# Patient Record
Sex: FEMALE | Race: WHITE | NOT HISPANIC OR LATINO | Employment: OTHER | URBAN - NONMETROPOLITAN AREA
[De-identification: names, ages, dates, MRNs, and addresses within clinical notes are randomized per-mention and may not be internally consistent; named-entity substitution may affect disease eponyms.]

---

## 2019-12-03 ENCOUNTER — OFFICE VISIT (OUTPATIENT)
Dept: CARDIOLOGY | Facility: CLINIC | Age: 84
End: 2019-12-03

## 2019-12-03 VITALS
HEIGHT: 65 IN | HEART RATE: 78 BPM | DIASTOLIC BLOOD PRESSURE: 70 MMHG | OXYGEN SATURATION: 98 % | BODY MASS INDEX: 22.99 KG/M2 | WEIGHT: 138 LBS | SYSTOLIC BLOOD PRESSURE: 150 MMHG

## 2019-12-03 DIAGNOSIS — R00.1 BRADYCARDIA: ICD-10-CM

## 2019-12-03 DIAGNOSIS — I10 ESSENTIAL HYPERTENSION: ICD-10-CM

## 2019-12-03 DIAGNOSIS — I48.19 PERSISTENT ATRIAL FIBRILLATION (HCC): Primary | ICD-10-CM

## 2019-12-03 PROBLEM — I48.91 ATRIAL FIBRILLATION: Status: ACTIVE | Noted: 2019-12-03

## 2019-12-03 PROCEDURE — 99204 OFFICE O/P NEW MOD 45 MIN: CPT | Performed by: INTERNAL MEDICINE

## 2019-12-03 RX ORDER — ASCORBATE CALCIUM 500 MG
1 TABLET ORAL DAILY
COMMUNITY

## 2019-12-03 RX ORDER — SIMVASTATIN 20 MG
20 TABLET ORAL NIGHTLY
COMMUNITY
Start: 2019-10-29 | End: 2022-03-07 | Stop reason: SDUPTHER

## 2019-12-03 RX ORDER — FOLIC ACID 1 MG/1
1 TABLET ORAL 3 TIMES WEEKLY
Status: ON HOLD | COMMUNITY
End: 2021-11-09

## 2019-12-03 RX ORDER — MULTIVITAMIN WITH IRON
1 TABLET ORAL DAILY
COMMUNITY

## 2019-12-03 RX ORDER — LISINOPRIL AND HYDROCHLOROTHIAZIDE 12.5; 1 MG/1; MG/1
1 TABLET ORAL DAILY
COMMUNITY
Start: 2019-11-18 | End: 2022-03-07 | Stop reason: SDUPTHER

## 2019-12-03 RX ORDER — LANOLIN ALCOHOL/MO/W.PET/CERES
1 CREAM (GRAM) TOPICAL DAILY
COMMUNITY

## 2019-12-03 NOTE — PROGRESS NOTES
"    Subjective:     Encounter Date:12/03/2019      Patient ID: Yudi Westbrook is a 87 y.o. female who has been diagnosed with atrial fibrillation and has been placed on anticoagulation, also found to have nocturnal bradycardia, with a history of hypertension and hyperlipidemia who was referred here for further evaluation.    Referring Provider: Meek Luis MD  Reason for Referral: Atrial fibrillation    Chief Complaint: \"Skipped beats:    Atrial Fibrillation   Presents for initial visit. Symptoms are negative for chest pain, dizziness, hemodynamic instability, hypotension, palpitations, shortness of breath, syncope, tachycardia and weakness. The symptoms have been stable. Past treatments include nothing. Past medical history includes atrial fibrillation, HTN and hyperlipidemia. There is no history of CAD and CHF.   Hypertension   This is a chronic problem. The problem is unchanged. The problem is controlled. Pertinent negatives include no blurred vision, chest pain, headaches, neck pain, orthopnea, palpitations, peripheral edema, PND or shortness of breath. There are no associated agents to hypertension. Risk factors for coronary artery disease include dyslipidemia. Current antihypertension treatment includes ACE inhibitors and diuretics. The current treatment provides significant improvement. There are no compliance problems.  There is no history of angina, CAD/MI, CVA, heart failure or PVD.     This is an 87-year-old female who has recently been diagnosed with atrial fibrillation and placed on anticoagulation who also had a Holter monitor showing nocturnal bradycardia he was referred here for further evaluation.  Patient says that for quite some time she has had some \"skipped beats\".  She says that she does note this when she checks her blood pressure and her monitor will note some skipped beats, however she says that she essentially has no symptoms of this.  Particularly, the patient denies lightheadedness, " dizziness, syncope.  She says that every once while she will have some heartburn-like chest discomfort but this has been present for quite some time and she relates this to what she eats at times.  No prolonged episodes of chest pain.  The patient remains very physically active, mowing her own yard and even using a chainsaw and has had no limitations to activities.  She notes age-related shortness of breath and dyspnea on exertion but not out of proportion to her level of activities.  She reports that she had previously been on diuretic therapy but was taken off of this and then had some lower extremity edema form around her ankles and therefore was placed back on diuretic therapy.  Currently, no significant lower extremity edema, orthopnea, PND.  She reports that her blood pressure is well controlled on her current medications.  Her lipids are followed by her primary care provider.    She says that after she was diagnosed with atrial fibrillation, she had a Holter monitor placed.  This study is referenced below.  She says that at the request of her primary care provider, there was concern about the bradycardia that was noted on the monitor therefore she was referred to cardiology in [place] and was ultimately referred here with the question about whether or not the patient may need a pacemaker for her bradycardia.  She says that she was placed on anticoagulation with a dose of 2.5 mg twice daily of Eliquis but that it was suggested that she may need 5 mg tablets however when samples were provided to her the dose remained at 2.5 mg.  So far, she has not had any significant bleeding difficulties.    As stated, the patient denies absolutely any lightheadedness, dizziness or syncope.    The following portions of the patient's history were reviewed and updated as appropriate: allergies, current medications, past family history, past medical history, past social history, past surgical history and problem list.      Past Medical History:   Diagnosis Date   • Atrial fibrillation (CMS/HCC)    • Bradycardia    • Cancer (CMS/HCC) 2002    Breast   • Hyperlipidemia    • Hypertension      Past Surgical History:   Procedure Laterality Date   • APPENDECTOMY     • HYSTERECTOMY     • MASTECTOMY Bilateral        Current Outpatient Medications:   •  apixaban (ELIQUIS) 2.5 MG tablet tablet, Take 2.5 mg by mouth 2 (Two) Times a Day., Disp: , Rfl:   •  BIOTIN 5000 PO, Take 1 tablet by mouth 1 (One) Time Per Week., Disp: , Rfl:   •  Calcium Carb-Cholecalciferol (CALCIUM 600+D3) 600-200 MG-UNIT tablet, Take 1 tablet by mouth Daily., Disp: , Rfl:   •  folic acid (FOLVITE) 1 MG tablet, Take 1 mg by mouth 3 (Three) Times a Week., Disp: , Rfl:   •  lisinopril-hydrochlorothiazide (PRINZIDE,ZESTORETIC) 10-12.5 MG per tablet, Take 1 tablet by mouth Daily., Disp: , Rfl:   •  Magnesium 250 MG tablet, Take 1 tablet by mouth Daily., Disp: , Rfl:   •  Multiple Vitamins-Minerals (CENTRUM SILVER ADULT 50+ PO), Take 1 tablet by mouth Daily., Disp: , Rfl:   •  Potassium 99 MG tablet, Take 1 tablet by mouth Daily., Disp: , Rfl:   •  simvastatin (ZOCOR) 20 MG tablet, Take 20 mg by mouth Daily., Disp: , Rfl:   •  vitamin B-12 (CYANOCOBALAMIN) 1000 MCG tablet, Take 1 tablet by mouth Daily., Disp: , Rfl:   •  Vitamin E 100 units tablet, Take 1 tablet by mouth Daily., Disp: , Rfl:     Allergies   Allergen Reactions   • Morphine Unknown - Low Severity     Has not taken. But family members cannot take.   • Penicillins Rash     Social History     Tobacco Use   • Smoking status: Never Smoker   • Smokeless tobacco: Never Used   Substance Use Topics   • Alcohol use: No     Frequency: Never     Family History   Problem Relation Age of Onset   • Hypertension Father    • Heart attack Brother    • Cancer Brother      Review of Systems   Constitution: Negative for chills, fever, weakness, night sweats and weight loss.   HENT: Negative for congestion and hearing loss.     Eyes: Negative for blurred vision and pain.   Cardiovascular: Negative for chest pain, claudication, dyspnea on exertion, leg swelling, orthopnea, palpitations, paroxysmal nocturnal dyspnea and syncope.   Respiratory: Negative for cough, hemoptysis, shortness of breath and wheezing.    Endocrine: Negative for cold intolerance, heat intolerance, polydipsia and polyuria.   Hematologic/Lymphatic: Negative for adenopathy and bleeding problem. Does not bruise/bleed easily.   Skin: Negative for color change, poor wound healing and rash.   Musculoskeletal: Negative for arthritis, back pain, joint pain, joint swelling, myalgias and neck pain.   Gastrointestinal: Negative for abdominal pain, change in bowel habit, constipation, diarrhea, heartburn, hematochezia, melena, nausea and vomiting.   Genitourinary: Negative for dysuria, frequency, hematuria and nocturia.   Neurological: Negative for dizziness, focal weakness, headaches, light-headedness, loss of balance and numbness.   Psychiatric/Behavioral: Negative for altered mental status, memory loss and substance abuse.   Allergic/Immunologic: Negative for hives and persistent infections.         ECG 12 Lead  Date/Time: 12/4/2019 9:18 AM  Performed by: Andrea Montilla MD  Authorized by: Andrea Montilla MD   Comparison: compared with previous ECG from 11/18/2019  Similar to previous ECG  Rhythm: atrial fibrillation  Rate: normal  BPM: 73  Conduction: conduction normal  QRS axis: left  Other findings: poor R wave progression    Clinical impression: abnormal EKG               Objective:     Physical Exam   Constitutional: She is oriented to person, place, and time. Vital signs are normal. She appears well-developed and well-nourished. She is cooperative.  Non-toxic appearance. No distress.   HENT:   Head: Normocephalic and atraumatic.   Right Ear: External ear normal.   Left Ear: External ear normal.   Nose: Nose normal.   Mouth/Throat: Uvula is midline,  oropharynx is clear and moist and mucous membranes are normal. Mucous membranes are not pale, not dry and not cyanotic. No oropharyngeal exudate.   Eyes: EOM and lids are normal. Pupils are equal, round, and reactive to light.   Neck: Normal range of motion. Neck supple. No hepatojugular reflux and no JVD present. Carotid bruit is not present. No tracheal deviation and no edema present. No thyroid mass and no thyromegaly present.   Cardiovascular: Normal rate, S1 normal, S2 normal, normal heart sounds, intact distal pulses and normal pulses. An irregularly irregular rhythm present.  No extrasystoles are present. PMI is not displaced. Exam reveals no gallop and no friction rub.   No murmur heard.  Pulses:       Radial pulses are 2+ on the right side, and 2+ on the left side.        Femoral pulses are 2+ on the right side, and 2+ on the left side.       Dorsalis pedis pulses are 2+ on the right side, and 2+ on the left side.        Posterior tibial pulses are 2+ on the right side, and 2+ on the left side.   Pulmonary/Chest: Effort normal and breath sounds normal. No accessory muscle usage. No respiratory distress. She has no wheezes. She has no rales. She exhibits no tenderness.   Abdominal: Soft. Normal appearance and bowel sounds are normal. She exhibits no distension, no abdominal bruit and no pulsatile midline mass. There is no hepatosplenomegaly. There is no tenderness.   Musculoskeletal: Normal range of motion. She exhibits no edema, tenderness or deformity.   Lymphadenopathy:     She has no cervical adenopathy.   Neurological: She is oriented to person, place, and time. She has normal strength. No cranial nerve deficit.   Skin: Skin is warm, dry and intact. No rash noted. She is not diaphoretic. No cyanosis or erythema. Nails show no clubbing.   Psychiatric: She has a normal mood and affect. Her speech is normal and behavior is normal. Thought content normal.   Vitals reviewed.    /70 (BP Location: Left  "arm, Patient Position: Sitting)   Pulse 78   Ht 165.1 cm (65\")   Wt 62.6 kg (138 lb)   SpO2 98%   BMI 22.96 kg/m²     Data/Lab Review:     I reviewed referral records as summarized below:    An echocardiogram on 11/19/2019 was noted to have normal left ventricular size and function, biatrial enlargement, moderate aortic insufficiency and moderate mitral valve regurgitation as well as severe tricuspid valve regurgitation.    A Holter monitor on 11/18/2019 shows an average heart rate of 61 bpm with a heart rate range of 33 to 94 bpm with the 33 bpm episode occurring at 4:27 AM.  Bradycardia was noted 12% of the time with the longest episode being 1 minute.        Assessment:          Diagnosis Plan   1. Persistent atrial fibrillation  ECG 12 Lead   2. Bradycardia     3. Essential hypertension            Plan:       1.  Persistent atrial fibrillation: The chronicity of the patient's atrial fibrillation is unknown.  At this time she is completely asymptomatic in terms of her atrial fibrillation.  She is currently anticoagulated with 2.5 mg twice daily of Eliquis.  Her age is greater than 80, however her weight is not less than 60 kg, therefore if her creatinine is less than 1.5, she may need the 5 mg dose.  She says that she will discuss this further with her primary care provider.  Unfortunate, I do not have any labs but I would suspect if her creatinine is less than 1.5, that she will be changed to the 5 mg twice daily dose.    2.  Bradycardia: The patient did have nocturnal bradycardia but did not have any prolonged episodes of bradycardia or any symptoms.  While her lowest heart rate on the Holter monitor was quite low, she was completely asymptomatic at that time.  Therefore, I do not feel that at this point and there is any indication for a pacemaker.  Certainly, should the patient have symptomatic bradycardia or have tachycardic episodes that may require rate controlling drugs, then a pacemaker could be " considered but at this point I would not consider a pacemaker as being necessary for this patient.    3.  Essential hypertension: Blood pressure is well controlled.  Continue current medications.    Patient's Body mass index is 22.96 kg/m². BMI is within normal parameters. No follow-up required.     Follow-up: We will be happy to see the patient back as needed but we will refer her back to her primary care provider as well as primary cardiologist at this time..

## 2019-12-04 PROCEDURE — 93000 ELECTROCARDIOGRAM COMPLETE: CPT | Performed by: INTERNAL MEDICINE

## 2020-07-24 ENCOUNTER — HOSPITAL ENCOUNTER (EMERGENCY)
Facility: HOSPITAL | Age: 85
Discharge: HOME | End: 2020-07-24
Attending: EMERGENCY MEDICINE | Admitting: EMERGENCY MEDICINE

## 2020-07-24 ENCOUNTER — APPOINTMENT (OUTPATIENT)
Dept: GENERAL RADIOLOGY | Facility: HOSPITAL | Age: 85
End: 2020-07-24

## 2020-07-24 ENCOUNTER — APPOINTMENT (OUTPATIENT)
Dept: MRI IMAGING | Facility: HOSPITAL | Age: 85
End: 2020-07-24

## 2020-07-24 ENCOUNTER — APPOINTMENT (OUTPATIENT)
Dept: CT IMAGING | Facility: HOSPITAL | Age: 85
End: 2020-07-24

## 2020-07-24 VITALS
TEMPERATURE: 97.9 F | BODY MASS INDEX: 21.69 KG/M2 | RESPIRATION RATE: 18 BRPM | OXYGEN SATURATION: 93 % | SYSTOLIC BLOOD PRESSURE: 177 MMHG | DIASTOLIC BLOOD PRESSURE: 76 MMHG | HEIGHT: 66 IN | WEIGHT: 135 LBS | HEART RATE: 58 BPM
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
## 2020-07-24 DIAGNOSIS — S32.010A CLOSED COMPRESSION FRACTURE OF BODY OF L1 VERTEBRA (HCC): ICD-10-CM

## 2020-07-24 DIAGNOSIS — Z74.09 IMPAIRED FUNCTIONAL MOBILITY, BALANCE, GAIT, AND ENDURANCE: Primary | ICD-10-CM

## 2020-07-24 DIAGNOSIS — S32.010A CLOSED COMPRESSION FRACTURE OF BODY OF L1 VERTEBRA (HCC): Primary | ICD-10-CM

## 2020-07-24 DIAGNOSIS — S32.010A COMPRESSION FRACTURE OF L1 VERTEBRA, INITIAL ENCOUNTER (HCC): Primary | ICD-10-CM

## 2020-07-24 LAB
25(OH)D3 SERPL-MCNC: 50.1 NG/ML (ref 30–100)
ANION GAP SERPL CALCULATED.3IONS-SCNC: 12 MMOL/L (ref 5–15)
BASOPHILS # BLD AUTO: 0.07 10*3/MM3 (ref 0–0.2)
BASOPHILS NFR BLD AUTO: 0.8 % (ref 0–1.5)
BILIRUB UR QL STRIP: NEGATIVE
BUN SERPL-MCNC: 19 MG/DL (ref 8–23)
BUN/CREAT SERPL: 29.7 (ref 7–25)
CALCIUM SPEC-SCNC: 10.1 MG/DL (ref 8.6–10.5)
CHLORIDE SERPL-SCNC: 101 MMOL/L (ref 98–107)
CLARITY UR: CLEAR
CO2 SERPL-SCNC: 26 MMOL/L (ref 22–29)
COLOR UR: YELLOW
CREAT SERPL-MCNC: 0.64 MG/DL (ref 0.57–1)
DEPRECATED RDW RBC AUTO: 45.5 FL (ref 37–54)
EOSINOPHIL # BLD AUTO: 0.12 10*3/MM3 (ref 0–0.4)
EOSINOPHIL NFR BLD AUTO: 1.4 % (ref 0.3–6.2)
ERYTHROCYTE [DISTWIDTH] IN BLOOD BY AUTOMATED COUNT: 13.4 % (ref 12.3–15.4)
GFR SERPL CREATININE-BSD FRML MDRD: 88 ML/MIN/1.73
GLUCOSE SERPL-MCNC: 115 MG/DL (ref 65–99)
GLUCOSE UR STRIP-MCNC: NEGATIVE MG/DL
HCT VFR BLD AUTO: 39.6 % (ref 34–46.6)
HGB BLD-MCNC: 12.8 G/DL (ref 12–15.9)
HGB UR QL STRIP.AUTO: NEGATIVE
HOLD SPECIMEN: NORMAL
HOLD SPECIMEN: NORMAL
IMM GRANULOCYTES # BLD AUTO: 0.04 10*3/MM3 (ref 0–0.05)
IMM GRANULOCYTES NFR BLD AUTO: 0.5 % (ref 0–0.5)
INR PPP: 2.16 (ref 0.91–1.09)
KETONES UR QL STRIP: NEGATIVE
LEUKOCYTE ESTERASE UR QL STRIP.AUTO: NEGATIVE
LYMPHOCYTES # BLD AUTO: 2.34 10*3/MM3 (ref 0.7–3.1)
LYMPHOCYTES NFR BLD AUTO: 26.7 % (ref 19.6–45.3)
MCH RBC QN AUTO: 30.1 PG (ref 26.6–33)
MCHC RBC AUTO-ENTMCNC: 32.3 G/DL (ref 31.5–35.7)
MCV RBC AUTO: 93.2 FL (ref 79–97)
MONOCYTES # BLD AUTO: 0.71 10*3/MM3 (ref 0.1–0.9)
MONOCYTES NFR BLD AUTO: 8.1 % (ref 5–12)
NEUTROPHILS NFR BLD AUTO: 5.47 10*3/MM3 (ref 1.7–7)
NEUTROPHILS NFR BLD AUTO: 62.5 % (ref 42.7–76)
NITRITE UR QL STRIP: NEGATIVE
NRBC BLD AUTO-RTO: 0 /100 WBC (ref 0–0.2)
PH UR STRIP.AUTO: 5.5 [PH] (ref 5–8)
PLATELET # BLD AUTO: 200 10*3/MM3 (ref 140–450)
PMV BLD AUTO: 10.5 FL (ref 6–12)
POTASSIUM SERPL-SCNC: 3.8 MMOL/L (ref 3.5–5.2)
PROT UR QL STRIP: NEGATIVE
PROTHROMBIN TIME: 23.9 SECONDS (ref 11.9–14.6)
PTH-INTACT SERPL-MCNC: 66.3 PG/ML (ref 15–65)
RBC # BLD AUTO: 4.25 10*6/MM3 (ref 3.77–5.28)
SARS-COV-2 RNA RESP QL NAA+PROBE: NOT DETECTED
SODIUM SERPL-SCNC: 139 MMOL/L (ref 136–145)
SP GR UR STRIP: >1.03 (ref 1–1.03)
UROBILINOGEN UR QL STRIP: ABNORMAL
WBC # BLD AUTO: 8.75 10*3/MM3 (ref 3.4–10.8)
WHOLE BLOOD HOLD SPECIMEN: NORMAL
WHOLE BLOOD HOLD SPECIMEN: NORMAL

## 2020-07-24 PROCEDURE — 99285 EMERGENCY DEPT VISIT HI MDM: CPT

## 2020-07-24 PROCEDURE — 82306 VITAMIN D 25 HYDROXY: CPT | Performed by: NURSE PRACTITIONER

## 2020-07-24 PROCEDURE — 99284 EMERGENCY DEPT VISIT MOD MDM: CPT | Performed by: NURSE PRACTITIONER

## 2020-07-24 PROCEDURE — 71045 X-RAY EXAM CHEST 1 VIEW: CPT

## 2020-07-24 PROCEDURE — 72125 CT NECK SPINE W/O DYE: CPT

## 2020-07-24 PROCEDURE — 85610 PROTHROMBIN TIME: CPT | Performed by: EMERGENCY MEDICINE

## 2020-07-24 PROCEDURE — 85025 COMPLETE CBC W/AUTO DIFF WBC: CPT | Performed by: EMERGENCY MEDICINE

## 2020-07-24 PROCEDURE — 74177 CT ABD & PELVIS W/CONTRAST: CPT

## 2020-07-24 PROCEDURE — 72131 CT LUMBAR SPINE W/O DYE: CPT

## 2020-07-24 PROCEDURE — 81003 URINALYSIS AUTO W/O SCOPE: CPT | Performed by: EMERGENCY MEDICINE

## 2020-07-24 PROCEDURE — 83970 ASSAY OF PARATHORMONE: CPT | Performed by: NURSE PRACTITIONER

## 2020-07-24 PROCEDURE — 72100 X-RAY EXAM L-S SPINE 2/3 VWS: CPT

## 2020-07-24 PROCEDURE — 87635 SARS-COV-2 COVID-19 AMP PRB: CPT | Performed by: EMERGENCY MEDICINE

## 2020-07-24 PROCEDURE — 72148 MRI LUMBAR SPINE W/O DYE: CPT

## 2020-07-24 PROCEDURE — 25010000002 IOPAMIDOL 61 % SOLUTION: Performed by: EMERGENCY MEDICINE

## 2020-07-24 PROCEDURE — 80048 BASIC METABOLIC PNL TOTAL CA: CPT | Performed by: EMERGENCY MEDICINE

## 2020-07-24 PROCEDURE — 70450 CT HEAD/BRAIN W/O DYE: CPT

## 2020-07-24 RX ORDER — ACETAMINOPHEN AND CODEINE PHOSPHATE 120; 12 MG/5ML; MG/5ML
0.5 SOLUTION ORAL ONCE
Status: DISCONTINUED | OUTPATIENT
Start: 2020-07-24 | End: 2020-07-24

## 2020-07-24 RX ORDER — ACETAMINOPHEN AND CODEINE PHOSPHATE 300; 30 MG/1; MG/1
1 TABLET ORAL EVERY 4 HOURS PRN
Qty: 16 TABLET | Refills: 0 | Status: ON HOLD | OUTPATIENT
Start: 2020-07-24 | End: 2021-11-08

## 2020-07-24 RX ORDER — ONDANSETRON 2 MG/ML
4 INJECTION INTRAMUSCULAR; INTRAVENOUS ONCE
Status: DISCONTINUED | OUTPATIENT
Start: 2020-07-24 | End: 2020-07-24 | Stop reason: HOSPADM

## 2020-07-24 RX ORDER — ONDANSETRON 4 MG/1
4 TABLET, ORALLY DISINTEGRATING ORAL EVERY 8 HOURS PRN
Qty: 12 TABLET | Refills: 0 | Status: ON HOLD | OUTPATIENT
Start: 2020-07-24 | End: 2021-11-08

## 2020-07-24 RX ORDER — SODIUM CHLORIDE 0.9 % (FLUSH) 0.9 %
10 SYRINGE (ML) INJECTION AS NEEDED
Status: DISCONTINUED | OUTPATIENT
Start: 2020-07-24 | End: 2020-07-24 | Stop reason: HOSPADM

## 2020-07-24 RX ORDER — ACETAMINOPHEN AND CODEINE PHOSPHATE 300; 30 MG/1; MG/1
1 TABLET ORAL ONCE
Status: COMPLETED | OUTPATIENT
Start: 2020-07-24 | End: 2020-07-24

## 2020-07-24 RX ADMIN — IOPAMIDOL 100 ML: 612 INJECTION, SOLUTION INTRAVENOUS at 07:09

## 2020-07-24 RX ADMIN — ACETAMINOPHEN AND CODEINE PHOSPHATE 1 TABLET: 300; 30 TABLET ORAL at 11:32

## 2020-07-24 NOTE — DISCHARGE INSTRUCTIONS
Turn as needed if the pain becomes too much for you.  If you worsen in any other way you may return the emergency room.

## 2020-07-24 NOTE — CONSULTS
NEUROSURGERY INITIAL HOSPITAL ENCOUNTER    Assessment/Plan:   Yudi Westbrook is a 88 y.o. female with a significant medical history of A-fib, chronic anticoagulation on Eliquis, HTN, and hyperlipidemia.   She presents with a new problem of lumbar back pain post fall from standing height. Physical exam findings of palpable tenderness to the thoracolumbar spine and global hyporeflexia, otherwise neurologically intact.  Their imaging shows subtle STIR signal change to the superior endplate of L1 suggesting an acute A1 wedge shaped compression fracture, multilevel degenerative changes and a left paracentral disc protrusion resulting in bilateral foraminal narrowing, worse on the left at L3-4.  No significant central canal stenosis note within the lumbar spine.  Upright and supine images of the lumbar spine are stable, showing no signs of instability.    Differential Diagnosis:   L1 compression fracture post fall  At right for osteoporosis     Recommendations:  Thoricolumbar Trauma    Classification system  Per National CNS Guidelines a classification scheme that uses readily available clinical data (e.g., computed tomography scans with or without magnetic resonance imaging) to convey injury morphology, such as Thoracolumbar Injury Classification and Severity Scale or the AO Spine Thoracolumbar Spine Injury Classification System, should be used to improve characterization of traumatic thoracolumbar injuries and communication among treating physicians. (Grade B)    TLICS Score    Subscore   Morphology - Compression = 1  - Burst = 2  - Translation/rotation = 3  - Distraction = 4 1   PLC integrety - Intact = 0  - Suspected = 2  - Injured = 3 2   Neurological status - Intact = 0  - Nerve Root = 2  - Complete Cord = 2  - Incomplete Cord = 3  - Cauda Equina = 3 0    TOTAL 3     0-3 - Nonsurgical management  4 - Surgeons discretion  >4 - Surgical intervention likely    A New Classification of Thoracolumbar Injuries   The  Importance of Injury Morphology, the Integrity of the Posterior Ligamentous Complex, and Neurologic Status by Alex Keith.    AO Spine Classification System  https://aospine.aofoundation.org/clinical-library-and-tools/ao-spine-classification-systems    Primary Injury  Spinal Level: Primary Injury; Neurological Status; Modifiers  Spinal Level L1: A1 - Wedge/Impaction; N0 - neurologically intact; M1 - indeterminate injury to the tension band, necessitating MRI eval    Pharmacological Treatments  Based on the data found in the literature, there are no agents that are specifically recommended for the pharmacologic treatment of acute thoracolumbar SCI.    Hemodynamic maintenance  Considering published data from pooled (cervical and thoracolumbar) spinal cord injury patient populations, clinicians may choose to maintain mean arterial blood pressures >85 mm Hg in an attempt to improve neurological outcomes.  (Consensus  statement)    Thromboembolic Prophylaxis  Based on published data from pooled (cervical and thoracolumbar) SCI populations--is that the use of thromboprophylaxis is recommended to reduce the risk of VTE events. (consensus statement)    Bracing  Management either with or without a TLSO brace is an option given equivalent improvement in outcomes for neurologically intact patients with thoracic and lumbar burst fractures. Bracing is not associated with increased adverse events compared to no brace. (Grade B)    Surgery  anterior, posterior, or a combined approach as the selection of approach does not appear to impact clinical or neurological outcomes. Grade B.  There is grade A evidence for the omission of fusion in instrumented fixation for thoracolumbar burst fractures. There is grade B evidence that percutaneous instrumentation is as effective as open instrumentation for thoracolumbar burst fractures.    It is suggested that “early” surgery be considered as an option in patients with thoracic  and lumbar fractures to reduce length of stay and complications. The available literature has defined “early” surgery inconsistently, ranging from <8 hours to <72 hours after injury.    TREATMENT RECOMMENDATIONS...  MRI lumbar to age fracture  TLSO for comfort and stability.  Brace to be worn at all times while out of bed.  Analgesics as needed for pain  Avoid NSAID's  Xray lumbar upright and supine to insure fracture stability  Ambulate patient with assistance to assess tolerance and functional ability.  If unable to tolerate ambulation, admit to hospital services, NSG will continue to follow.  Otherwise, DC home with family.   Written Rx provided for Rollator walker  Follow-up in the neurosurgical clinic in 2 weeks for reassessment.   Outpatient DEXA scan to assess bone quality.    Osteoporosis/ At risk for...  The  following recommendations are based on the AACE clinical practice guidelines for the diagnostic and treatment of postmenopausal osteoporosis:  AMERICAN ASSOCIATION OF CLINICAL ENDOCRINOLOGISTS/ AMERICAN COLLEGE OF ENDOCRINOLOGY CLINICAL PRACTICE GUIDELINES FOR THE DIAGNOSIS AND TREATMENT OF POSTMENOPAUSAL OSTEOPOROSIS-- 2020 UPDATE     The National Osteoporosis Foundation (NOF) estimates that 10.2 million Americans have osteoporosis and that an additional 43.4 million have low bone mass. More than 2 million osteoporosis-related fractures occur annually in the [place] more than 70% of these occur in women.  As a result of an aging population, osteoporosis is a growing major public health problem in the [place] with an estimated Medicare cost to be more than $25 billion by 2025.      Fracture risk assessment and osteoporosis diagnosis   • Evaluate all postmenopausal women age ? 50 for osteoporosis risk (Grade B)   • Detailed history, physical exam, and clinical fracture risk assessment tool (FRAX) (Grade B)     Yudi has the following risk factors for osteoporosis:  General: Age 65 and over and Family  history of osteoporotic fracture  Endocrine or metabolic causes: None  Nutritional/GI conditions: Vitamin D deficiency  Drugs: None  Disorders of collagen metabolism:None    • Consider bone mineral density (BMD) testing based on clinical fracture risk profile (Grade B)    Indications for bone mineral density testing:  All women 65 years of age and older and All postmenopausal women with history of Fracture(s) without major trauma    FRAX Fracture Risk Assessment Tool (https://www.walker.ac.uk/FRAX/tool.aspx?country=9)  Based on the FRAX score Yudi has a ten year probability of a major osteoporotic fracture of 56% and a hip fracture of 50%.    *FRAX scores ?3% for hip fracture or ?20% for major osteoporotic fracture in the [place] are recommended to consider osteoporosis treatment.    *Osteoporosis may be diagnosed based on presence of fragility fractures in the absence of other metabolic bone disorders and even with a normal bone mineral density (T-score). (Grade B)    Previous BMD Testing: Last DEXA scan performed: unknown. Order for outpatient DEXA scan provided today.    Recommended Laboratory testing to assess for causes of secondary osteoporosis (Grade B)  • CBC, CMP, 25-hydroxyvitamin D, Calcium, and intact parathyroid hormone (PTH).    Previously labs:  Lab Results   Component Value Date    WBC 8.75 07/24/2020    RBC 4.25 07/24/2020    HGB 12.8 07/24/2020    HCT 39.6 07/24/2020     07/24/2020     Lab Results   Component Value Date    CREATININE 0.64 07/24/2020     07/24/2020    K 3.8 07/24/2020     Lab Results   Component Value Date    CALCIUM 10.1 07/24/2020    PTH 66.3 (H) 07/24/2020     Vertebral Augmentation for Compression Fractures  • Vertebroplasty and kyphoplasty are not recommended as first-line treatment of vertebral fractures, given an unclear benefit on overall pain and a potential increased risk of vertebral fractures in adjacent vertebrae.  (Grade A)  • Vertebral fractures can be  Yes, Non-Core measure site... associated with pain and limit mobility. Surgical procedures, including vertebroplasty and kyphoplasty, have been considered for relief of vertebral fracture pain. Initial data on two randomized, controlled studies comparing vertebroplasty versus a control procedure on a primary outcome of overall pain showed no significant benefit from vertebroplasty up to 1 month (347) and up to 6 months (348). A meta-analysis of individual patient data from two blinded trials of vertebroplasty failed to show an advantage of vertebroplasty over placebo for participants with acute fractures (<6 weeks) or severe pain (349). A study with 2-year follow-up data of patients with acute osteoporotic vertebral fractures found no beneficial effects of vertebroplasty over a sham procedure at 12 or 24 months (350). Both vertebroplasty and kyphoplasty have been suggested to increase the risk of vertebral fractures in the adjacent vertebrae. Despite a potential benefit with faster pain relief, a significantly increased incidence of additional vertebral fractures in patients undergoing vertebroplasty compared with placebo was noted in a randomized, controlled trial of 125 patients with vertebral fractures at 12 months’ follow-up (351). By contrast, another study found no difference in new fractures in patients receiving vertebroplasty versus usual care at a mean of 11.4 months, with decreased severity of further height loss in treated vertebrae (352). In a meta-analysis assessing the safety of balloon kyphoplasty in patients with symptomatic osteoporotic vertebral fractures, new vertebral fractures were detected in 20.7% of treated patients, and more than half of the cases had fractures adjacent to the treated level (353). Given the limitations to these published studies, the role for surgical procedures in treatment of vertebral fractures remains uncertain.     Summary of Orders  DEXA: Previous BMD Testing: Order for outpatient DEXA scan  provided today.  Labs: CBC, CMP,  25-hydroxyvitamin D  and Intact parathyroid hormone (PTH).  Counseling detailing:  • Limiting alcohol intake to no more than 2 units per day (Grade B)  • Avoid or stop smoking (Grade B)  • Maintain an active lifestyle, including weightbearing, balance, and resistance exercises (Grade A)  • Reducing risk of falls, particularly among the elderly (Grade B)  • *Consider referral for physical therapy, which may reduce discomfort, prevent falls, and improve quality of life (Grade A)    I discussed the patients findings and my recommendations with patient, family and consulting provider, Dr. Reno    Thank you very much for this interesting consult.     Level of Risk: High due to:  abrupt change in neurological status  MDM: High Complexity  Mod = 67878, High=99223  ________________________________________________________________    Reason for consult: Lumbar back pain post fall    Chief Complaint:   Chief Complaint   Patient presents with   • Fall   • Back Pain     HPI: Yudi Westbrook is a 88 y.o. female with a significant comorbidity of  A-fib, chronic anticoagulation on Eliquis, HTN, and hyperlipidemia. Ms. Westbrook presents to [facility] ED this morning with a complaint of lumbar back pain.  No previous spine surgeries.      Onset of lumbar back pain began this morning at approximately 0200 post fall from standing height.  Ms. Westbrook states she stood from her recliner, experienced bilateral leg cramps, and fell in a twisting motion.  She denies hitting her head or loss of consciousness.  She was capable of standing without assistance and ambulating to her bed.  She notified her son this morning at approximately 0500.  EMS was notified, and she was transported to [facility] ED for further evaluation and care.    Currently she complains of constant thoracolumbar back pain that worsens with movement and resolves to some extent with lying flat in supine position.   She denies lower extremity radicular pain, weakness, numbness, or tingling.  She additionally denies fevers, chills, night sweats, unexplained weight loss, saddle anesthesia, or bowel or bladder dysfunction.  She currently rates the severity of her symptoms 8-9/10.  No additional concerns at this time.     Review of Systems   Constitutional: Negative.  Negative for chills, fever and unexpected weight change.   HENT: Negative.    Eyes: Negative.    Respiratory: Negative.    Cardiovascular: Negative.    Gastrointestinal: Negative.    Endocrine: Negative.    Genitourinary: Negative.    Musculoskeletal: Positive for back pain. Negative for gait problem.   Skin: Negative.    Allergic/Immunologic: Negative.    Neurological: Negative.  Negative for syncope, weakness and numbness.   Hematological: Negative.    Psychiatric/Behavioral: Negative.    All other systems reviewed and are negative.     Past Medical History:  has a past medical history of Atrial fibrillation (CMS/Formerly [facility]), Bradycardia, Cancer (CMS/Formerly [facility]) (2002), Hyperlipidemia, and Hypertension.    Past Surgical History:  has a past surgical history that includes Mastectomy (Bilateral); Appendectomy; and Hysterectomy.    Family History: family history includes Cancer in her brother; Heart attack in her brother; Hypertension in her father.    Social History:  reports that she has never smoked. She has never used smokeless tobacco. She reports that she does not drink alcohol or use drugs.    Allergies: Morphine and Penicillins    Home Medications:   Current Facility-Administered Medications:   •  morphine injection 4 mg, 4 mg, Intravenous, Once, Justice Kelly MD  •  ondansetron (ZOFRAN) injection 4 mg, 4 mg, Intravenous, Once, Justice Kelly MD  •  sodium chloride 0.9 % flush 10 mL, 10 mL, Intravenous, PRN, Justice Kelly MD    Current Outpatient Medications:   •  acetaminophen-codeine (TYLENOL #3) 300-30 MG per tablet, Take 1 tablet by mouth Every 4  (Four) Hours As Needed for Moderate Pain ., Disp: 16 tablet, Rfl: 0  •  apixaban (ELIQUIS) 2.5 MG tablet tablet, Take 2.5 mg by mouth 2 (Two) Times a Day., Disp: , Rfl:   •  BIOTIN 5000 PO, Take 1 tablet by mouth 1 (One) Time Per Week., Disp: , Rfl:   •  Calcium Carb-Cholecalciferol (CALCIUM 600+D3) 600-200 MG-UNIT tablet, Take 1 tablet by mouth Daily., Disp: , Rfl:   •  folic acid (FOLVITE) 1 MG tablet, Take 1 mg by mouth 3 (Three) Times a Week., Disp: , Rfl:   •  lisinopril-hydrochlorothiazide (PRINZIDE,ZESTORETIC) 10-12.5 MG per tablet, Take 1 tablet by mouth Daily., Disp: , Rfl:   •  Magnesium 250 MG tablet, Take 1 tablet by mouth Daily., Disp: , Rfl:   •  Multiple Vitamins-Minerals (CENTRUM SILVER ADULT 50+ PO), Take 1 tablet by mouth Daily., Disp: , Rfl:   •  ondansetron ODT (ZOFRAN-ODT) 4 MG disintegrating tablet, Place 1 tablet on the tongue Every 8 (Eight) Hours As Needed for Nausea., Disp: 12 tablet, Rfl: 0  •  Potassium 99 MG tablet, Take 1 tablet by mouth Daily., Disp: , Rfl:   •  simvastatin (ZOCOR) 20 MG tablet, Take 20 mg by mouth Daily., Disp: , Rfl:   •  vitamin B-12 (CYANOCOBALAMIN) 1000 MCG tablet, Take 1 tablet by mouth Daily., Disp: , Rfl:   •  Vitamin E 100 units tablet, Take 1 tablet by mouth Daily., Disp: , Rfl:     Medications: Scheduled Meds:    Morphine 4 mg Intravenous Once   ondansetron 4 mg Intravenous Once     Continuous Infusions:   PRN Meds:.sodium chloride    Vital Signs  Temp:  [97.9 °F (36.6 °C)] 97.9 °F (36.6 °C)  Heart Rate:  [56-68] 58  Resp:  [14-18] 18  BP: (103-178)/(66-83) 177/76    Physical Exam  Physical Exam   Constitutional: She is oriented to person, place, and time. Vital signs are normal. She appears well-developed and well-nourished. She is cooperative.  Non-toxic appearance. She does not have a sickly appearance. She does not appear ill. No distress.   BMI 21.76   HENT:   Head: Normocephalic and atraumatic.   Right Ear: Hearing normal.   Left Ear: Hearing normal.    Mouth/Throat: Mucous membranes are normal.   Eyes: Pupils are equal, round, and reactive to light. Conjunctivae and EOM are normal.   Neck: Trachea normal and full passive range of motion without pain. Neck supple.   Cardiovascular: Normal rate and regular rhythm.   Pulmonary/Chest: Effort normal. No accessory muscle usage. No apnea, no tachypnea and no bradypnea. No respiratory distress.   Abdominal: Soft. Normal appearance.   Neurological: She is alert and oriented to person, place, and time. GCS eye subscore is 4. GCS verbal subscore is 5. GCS motor subscore is 6.   Reflex Scores:       Tricep reflexes are 1+ on the right side and 1+ on the left side.       Bicep reflexes are 1+ on the right side and 1+ on the left side.       Brachioradialis reflexes are 1+ on the right side and 1+ on the left side.       Patellar reflexes are 1+ on the right side and 1+ on the left side.       Achilles reflexes are 1+ on the right side and 1+ on the left side.  Skin: Skin is warm, dry and intact. Bruising noted. She is not diaphoretic.   Multiple bruises in various stages of healing to the anterior aspect of the bilateral legs   Psychiatric: She has a normal mood and affect. Her speech is normal and behavior is normal.   Nursing note and vitals reviewed.      Neurologic Exam     Mental Status   Oriented to person, place, and time.   Attention: normal. Concentration: normal.   Speech: speech is normal   Level of consciousness: alert    Bright and awake.  Oriented x3.  Follows commands without prompting.     Cranial Nerves     CN II   Visual fields full to confrontation.     CN III, IV, VI   Pupils are equal, round, and reactive to light.  Extraocular motions are normal.     CN V   Facial sensation intact.     CN VII   Facial expression full, symmetric.     CN VIII   CN VIII normal.     CN IX, X   CN IX normal.     CN XI   CN XI normal.     Motor Exam   Muscle bulk: normal  Overall muscle tone: normal  Right arm tone:  normal  Left arm tone: normal  Right arm pronator drift: absent  Left arm pronator drift: absent  Right leg tone: normal  Left leg tone: normal    Strength   Right deltoid: 5/5  Left deltoid: 5/5  Right biceps: 5/5  Left biceps: 5/5  Right triceps: 5/5  Left triceps: 5/5  Right wrist extension: 5/5  Left wrist extension: 5/5  Right iliopsoas: 5/5  Left iliopsoas: 5/5  Right quadriceps: 5/5  Left quadriceps: 5/5  Right anterior tibial: 5/5  Left anterior tibial: 5/5  Right posterior tibial: 5/5  Left posterior tibial: 5/5  Moves all extremities equal and symmetric     Sensory Exam   Right arm light touch: normal  Left arm light touch: normal  Right leg light touch: normal  Left leg light touch: normal    Gait, Coordination, and Reflexes     Tremor   Resting tremor: absent  Intention tremor: absent  Action tremor: absent    Reflexes   Right brachioradialis: 1+  Left brachioradialis: 1+  Right biceps: 1+  Left biceps: 1+  Right triceps: 1+  Left triceps: 1+  Right patellar: 1+  Left patellar: 1+  Right achilles: 1+  Left achilles: 1+  Right plantar: normal  Left plantar: normal  Right Hanson: absent  Left Hanson: absent  Right ankle clonus: absent  Left ankle clonus: absent  Right pendular knee jerk: absent  Left pendular knee jerk: absent    Results Review:   Independent review and interpretation of imaging  Imaging Results (Last 24 Hours)     Procedure Component Value Units Date/Time    XR Spine Lumbar 2 or 3 View [[id number]] Collected:  07/24/20 1241     Updated:  07/24/20 1246    Narrative:       XR SPINE LUMBAR 2 OR 3 VW- 7/24/2020 11:05 AM CDT     HISTORY: L1 fracture; assess for instability     COMPARISON: MRI dated 7/24/2020      FINDINGS:   Lateral upright and supine radiographs of the lumbar spine were  obtained.      Reidentified L1 compression deformity with approximately 20% loss of  height in the anterior column. No loss of height identified in the  middle column. No subluxation appreciated in the  upright or supine  positions. Vertebral body heights are otherwise maintained. Underlying  facet arthropathy with neuroforaminal narrowing.       Impression:       1. Known L1 compression deformity with approximately 20% loss of height  in the anterior column. No subluxation in the supine or upright  positions.     This report was finalized on 07/24/2020 12:43 by Dr Bernardo Hahn, .    XR Chest 1 View [[id number]] Collected:  07/24/20 1237     Updated:  07/24/20 1242    Narrative:       Exam:   XR CHEST 1 VW-       Date:  7/24/2020      History:  Female, age  88 years;has compression fracture of L1.  Nurse  reported that she became hypoxic with O2 sat 84%.     COMPARISON:  None.     Findings :     Mild cardiomegaly. Coarsening of interstitium, nonspecific finding as  may be seen in chronic interstitial lung changes as well as mild/early  fluid overload. Lungs are without focal infiltrate, mass or effusions.  No measurable pneumothorax. The bones show no acute pathology. Remote  trauma to the left chest wall.       Impression:       Impression:     1.  Mild cardiomegaly.  2.  Coarsened interstitium. Differential includes chronic interstitial  lung changes as well as a mild/early fluid overload.     This report was finalized on 07/24/2020 12:39 by Dr. Sierra Yo MD.    MRI Lumbar Spine Without Contrast [[id number]] Collected:  07/24/20 1034     Updated:  07/24/20 1048    Narrative:       EXAM: MR LUMBOSACRAL SPINE WITHOUT IV CONTRAST 7/24/2020     COMPARISON: None.      INDICATION: 88 years-old Female.  Acute compression fracture L1.     TECHNIQUE:   Routine pulse sequences of the lumbar spine were obtained without IV  contrast.      FINDINGS:   Presumed 5 nonrib-bearing vertebral bodies.  Straightening of the normal lordosis of the lumbar spine. Subtle grade 1  anterolisthesis of L3 on L4.   The bone marrow is demineralized.     There is an acute compression deformity of the L1 vertebral body with  approximately  15% height loss. There is edema extending to the posterior  cortex, but without posterior cortical disruption. There is no  associated epidural hematoma. Prevertebral soft tissue swelling noted.     No additional compression deformity identified.  The conus terminates at T12  There are no significant spinal cord signal abnormalities.    Disc desiccation and height loss at multiple levels.   The prevertebral and paraspinal soft tissues are unremarkable.     Mild to moderate foraminal stenosis bilaterally at L3-L4.       Impression:       Acute compression deformity of the upper endplate of L1 vertebral body,  with approximately 15% height loss. No retropulsion. Epidural hematoma.  No associated cord compression or spinal canal stenosis.  This report was finalized on 07/24/2020 10:45 by Dr. Sierra Yo MD.    CT Abdomen Pelvis With Contrast [[id number]] Collected:  07/24/20 0758     Updated:  07/24/20 0805    Narrative:       EXAM: CT Abdomen and Pelvis with contrast      7/24/2020 7:58 AM CDT  INDICATION: fall, abdominal pain, back pain, on coumadin  COMPARISON: None  TECHNIQUE:  Abdomen and pelvis were scanned utilizing a multidetector helical  scanner from the diaphragm to the ischial tuberosities after  administration of IV contrast. Coronal and sagittal reformations were  obtained. [Routine protocol is performed.]     Radiation dose equals .5 mGy-cm.  Automated exposure control dose  reduction technique was implemented.        FINDINGS:     LINES and TUBES: None.     LOWER THORAX: Atelectasis at the lung bases. Coronary artery disease.  Small hiatal hernia.     HEPATOBILIARY:  No focal hepatic lesions.   No liver laceration.   Mild  periportal edema, nonspecific..      GALLBLADDER:  No radiopaque stones or sludge.   No wall thickening.      SPLEEN:  No splenomegaly.    No splenic laceration.      PANCREAS:  No focal masses or ductal dilatation.      ADRENALS:  No adrenal nodules.      KIDNEYS/URETERS:   Kidneys enhance symmetrically.   No hydronephrosis.    Too-small -to characterize hypodensities..  No stones.      GI TRACT:  No abnormal distention, wall thickening, or evidence of bowel  obstruction.   There is no bowel obstruction. No acute appendicitis.  Colonic diverticulosis, without evidence of acute diverticulitis..      PELVIC ORGANS/BLADDER:  Unremarkable.      LYMPH NODES:  There is no inguinal canal, pelvic wall, retroperitoneal  or mesenteric lymphadenopathy by size criteria..      VESSELS:  Atherosclerotic disease. No aortic aneurysm. No evidence of  acute aortic injury.. The portal vein is patent.     PERITONEUM / RETROPERITONEUM:  No free air or fluid.      BONES:  There is acute compression deformity of the upper endplate of L1  vertebral body. No retropulsion. No evidence of posterior cortical  disruption.     SOFT TISSUES:  Unremarkable.        Impression:       1.  Acute compression deformity of the L1 vertebral body.  2.  Otherwise, no acute intra-abdominal pelvic abnormalities.  This report was finalized on 07/24/2020 08:02 by Dr. Sierra Yo MD.    CT Lumbar Spine Without Contrast [[id number]] Collected:  07/24/20 0753     Updated:  07/24/20 0800    Narrative:       EXAMINATION: CT LUMBAR SPINE WITHOUT IV CONTRAST 7/24/2020     COMPARISON: None.     HISTORY: Female, 88 years-old. Fall, back pain, on coumadin.     TECHNIQUE: Multiple CT images were obtained of the lumbar spine without  IV contrast. The images were formatted in the axial, coronal and  sagittal planes.     Radiation dose equals  mGy-cm.  Automated exposure control dose  reduction technique was implemented.     FINDINGS:      Preservation of the normal lordosis of the lumbar spine.There is no  acute compression deformity of the upper endplate of L1, with 15% height  loss. No posterior cortical disruption. No associated significant spinal  canal stenosis. No abnormal soft tissue density within the spinal canal.           Impression:       1.  Acute compression deformity of the L1 vertebral body, with  approximately 15% height loss.        This report was finalized on 07/24/2020 07:57 by Dr. Sierra Yo MD.    CT Head Without Contrast [[id number]] Collected:  07/24/20 0747     Updated:  07/24/20 0756    Narrative:       EXAM:   CT OF THE HEAD WITHOUT IV CONTRAST   CT CERVICAL SPINE WITHOUT IV CONTRAST 7/24/2020     COMPARISON: None.      INDICATION: Trauma      PROCEDURE: Non contrast enhanced head CT and cervical spine CT were  performed. The head images were formatted in the axial plane at 5 mm  thick intervals. The cervical spine images were formatted in the axial,  coronal and sagittal planes.     Radiation dose equals  mGy-cm.  Automated exposure control dose  reduction technique was implemented.     FINDINGS:   HEAD: Ventricles and cerebrospinal fluid spaces are normal in size and  configuration for the patient's age. There is no evidence of mass-effect  or midline shift. The gray-white differentiation is preserved.  There is  no evidence of intracranial contusion, hemorrhage, or skull fracture.   The visualized portions of the paranasal sinuses and mastoid air cells  are unremarkable.     CERVICAL SPINE: The cervical spine knees after extended Grade 1  retrolisthesis of C4 on C5. Grade 1 anterolisthesis of C6 on C7. There  is mild wedging of the C4 and C5 vertebral bodies, without associated  soft tissue changes. No dislocation. There is no paraspinal hematoma.          Impression:       CT head. No acute intracranial abnormalities.     CT cervical spine. Mild wedging of the C4 and C5 upper endplate, with  less than 10% height loss. There is no associated soft tissue changes.  However, no comparison is available and therefore chronicity is  uncertain.  This report was finalized on 07/24/2020 07:53 by Dr. Sierra Yo MD.    CT Cervical Spine Without Contrast [[id number]] Collected:  07/24/20 0747     Updated:   07/24/20 0756    Narrative:       EXAM:   CT OF THE HEAD WITHOUT IV CONTRAST   CT CERVICAL SPINE WITHOUT IV CONTRAST 7/24/2020     COMPARISON: None.      INDICATION: Trauma      PROCEDURE: Non contrast enhanced head CT and cervical spine CT were  performed. The head images were formatted in the axial plane at 5 mm  thick intervals. The cervical spine images were formatted in the axial,  coronal and sagittal planes.     Radiation dose equals  mGy-cm.  Automated exposure control dose  reduction technique was implemented.     FINDINGS:   HEAD: Ventricles and cerebrospinal fluid spaces are normal in size and  configuration for the patient's age. There is no evidence of mass-effect  or midline shift. The gray-white differentiation is preserved.  There is  no evidence of intracranial contusion, hemorrhage, or skull fracture.   The visualized portions of the paranasal sinuses and mastoid air cells  are unremarkable.     CERVICAL SPINE: The cervical spine knees after extended Grade 1  retrolisthesis of C4 on C5. Grade 1 anterolisthesis of C6 on C7. There  is mild wedging of the C4 and C5 vertebral bodies, without associated  soft tissue changes. No dislocation. There is no paraspinal hematoma.          Impression:       CT head. No acute intracranial abnormalities.     CT cervical spine. Mild wedging of the C4 and C5 upper endplate, with  less than 10% height loss. There is no associated soft tissue changes.  However, no comparison is available and therefore chronicity is  uncertain.  This report was finalized on 07/24/2020 07:53 by Dr. Sierra Yo MD.        MRI brain:  MRI spine:     CT Head:  CT c-spine:  CT t-spine:  CT l-spine:    X-ray:    I reviewed the patient's new clinical results.  Lab Results (last 24 hours)     Procedure Component Value Units Date/Time    PTH, Intact [[id number]]  (Abnormal) Collected:  07/24/20 1210    Specimen:  Blood Updated:  07/24/20 1250     PTH, Intact 66.3 pg/mL     Narrative:        Results may be falsely decreased if patient taking Biotin.      Vitamin D 25 Hydroxy [[id number]] Collected:  07/24/20 1210    Specimen:  Blood Updated:  07/24/20 1220    COVID PRE-OP / PRE-PROCEDURE SCREENING ORDER (NO ISOLATION) - Swab, Nasopharynx [[id number]] Collected:  07/24/20 0911    Specimen:  Swab from Nasopharynx Updated:  07/24/20 1018    Narrative:       The following orders were created for panel order COVID PRE-OP / PRE-PROCEDURE SCREENING ORDER (NO ISOLATION) - Swab, Nasopharynx.  Procedure                               Abnormality         Status                     ---------                               -----------         ------                     COVID-19,CEPHEID,COR/MEHRAN...[[id number]]  Normal              Final result                 Please view results for these tests on the individual orders.    COVID-19,CEPHEID,COR/MEHRAN/PAD IN-HOUSE(OR EMERGENT/ADD-ON),NP SWAB IN TRANSPORT MEDIA 3-4 HR TAT - Swab, Nasopharynx [[id number]]  (Normal) Collected:  07/24/20 0911    Specimen:  Swab from Nasopharynx Updated:  07/24/20 1018     COVID19 Not Detected    Narrative:       Fact sheet for providers: https://www.fda.gov/media/665477/download     Fact sheet for patients: https://www.fda.gov/media/548678/download    Urinalysis With Microscopic If Indicated (No Culture) - Urine, Clean Catch [[id number]]  (Abnormal) Collected:  07/24/20 0845    Specimen:  Urine, Clean Catch Updated:  07/24/20 0900     Color, UA Yellow     Appearance, UA Clear     pH, UA 5.5     Specific Gravity, UA >1.030     Glucose, UA Negative     Ketones, UA Negative     Bilirubin, UA Negative     Blood, UA Negative     Protein, UA Negative     Leuk Esterase, UA Negative     Nitrite, UA Negative     Urobilinogen, UA 0.2 E.U./dL    Narrative:       Urine microscopic not indicated.    [place] Draw [[id number]] Collected:  07/24/20 0631    Specimen:  Blood Updated:  07/24/20 0745    Narrative:       The following orders were created for panel  order [place] Draw.  Procedure                               Abnormality         Status                     ---------                               -----------         ------                     Light Blue Top[[id number]]                                   Final result               Green Top (Gel)[[id number]]                                  Final result               Lavender Top[[id number]]                                     Final result               Red Top[[id number]]                                          Final result                 Please view results for these tests on the individual orders.    Light Blue Top [[id number]] Collected:  07/24/20 0631    Specimen:  Blood Updated:  07/24/20 0745     Extra Tube hold for add-on     Comment: Auto resulted       Green Top (Gel) [[id number]] Collected:  07/24/20 0631    Specimen:  Blood Updated:  07/24/20 0745     Extra Tube Hold for add-ons.     Comment: Auto resulted.       Lavender Top [[id number]] Collected:  07/24/20 0631    Specimen:  Blood Updated:  07/24/20 0745     Extra Tube hold for add-on     Comment: Auto resulted       Red Top [[id number]] Collected:  07/24/20 0631    Specimen:  Blood Updated:  07/24/20 0745     Extra Tube Hold for add-ons.     Comment: Auto resulted.       Basic Metabolic Panel [[id number]]  (Abnormal) Collected:  07/24/20 0631    Specimen:  Blood Updated:  07/24/20 0650     Glucose 115 mg/dL      BUN 19 mg/dL      Creatinine 0.64 mg/dL      Sodium 139 mmol/L      Potassium 3.8 mmol/L      Chloride 101 mmol/L      CO2 26.0 mmol/L      Calcium 10.1 mg/dL      eGFR Non African Amer 88 mL/min/1.73      BUN/Creatinine Ratio 29.7     Anion Gap 12.0 mmol/L     Narrative:       GFR Normal >60  Chronic Kidney Disease <60  Kidney Failure <15      Protime-INR [[id number]]  (Abnormal) Collected:  07/24/20 0631    Specimen:  Blood Updated:  07/24/20 0648     Protime 23.9 Seconds      INR 2.16    CBC & Differential [[id number]] Collected:  07/24/20 0631     Specimen:  Blood Updated:  07/24/20 0641    Narrative:       The following orders were created for panel order CBC & Differential.  Procedure                               Abnormality         Status                     ---------                               -----------         ------                     CBC Auto Differential[[id number]]        Normal              Final result                 Please view results for these tests on the individual orders.    CBC Auto Differential [[id number]]  (Normal) Collected:  07/24/20 0631    Specimen:  Blood Updated:  07/24/20 0641     WBC 8.75 10*3/mm3      RBC 4.25 10*6/mm3      Hemoglobin 12.8 g/dL      Hematocrit 39.6 %      MCV 93.2 fL      MCH 30.1 pg      MCHC 32.3 g/dL      RDW 13.4 %      RDW-SD 45.5 fl      MPV 10.5 fL      Platelets 200 10*3/mm3      Neutrophil % 62.5 %      Lymphocyte % 26.7 %      Monocyte % 8.1 %      Eosinophil % 1.4 %      Basophil % 0.8 %      Immature Grans % 0.5 %      Neutrophils, Absolute 5.47 10*3/mm3      Lymphocytes, Absolute 2.34 10*3/mm3      Monocytes, Absolute 0.71 10*3/mm3      Eosinophils, Absolute 0.12 10*3/mm3      Basophils, Absolute 0.07 10*3/mm3      Immature Grans, Absolute 0.04 10*3/mm3      nRBC 0.0 /100 WBC         Kenroy Luna, APRN

## 2020-07-27 ENCOUNTER — TELEPHONE (OUTPATIENT)
Dept: NEUROSURGERY | Facility: CLINIC | Age: 85
End: 2020-07-27

## 2020-07-28 ENCOUNTER — TELEPHONE (OUTPATIENT)
Dept: NEUROSURGERY | Facility: CLINIC | Age: 85
End: 2020-07-28

## 2020-07-28 NOTE — TELEPHONE ENCOUNTER
I spoke with patient son to advise him that it would be ok if she is running later for her apt. I told patient son that I would put a note in her chart. Patient voiced understanding.

## 2020-08-02 NOTE — PROGRESS NOTES
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 studies, the role for surgical procedures in treatment of vertebral fractures remains uncertain.     Summary of Orders  DEXA: Previous BMD Testing: Order for outpatient DEXA scan provided today.  Labs: Not indicated at this time  Counseling and pre-printed information detailing:  • Limiting alcohol intake to no more than 2 units per day (Grade B)  • Avoid or stop smoking (Grade B)  • Maintain an active lifestyle, including weightbearing, balance, and resistance exercises (Grade A)  • Reducing risk of falls, particularly among the elderly (Grade B)  • *Consider referral for physical therapy, which may reduce discomfort, prevent falls, and improve quality of life (Grade A)  Rx provided for Fosamax 70 mg p.o. Weekly    ADVANCED CARE PLANNING  A thorough discussion, as well as information was proved in today's AVS on advanced healthcare planning topics to include fall prevention.  Additionally, ACP discussion was declined by the patient. Patient does not have an advance directive, information provided.    Yudi was seen today for back pain.    Diagnoses and all orders for this visit:    Compression fracture of L1 vertebra with routine healing  -     XR Spine Lumbar 2 or 3 View; Future  -     HYDROcodone-acetaminophen  (NORCO) 5-325 MG per tablet; Take 1 tablet by mouth Every 6 (Six) Hours As Needed for Severe Pain .    At high risk for fracture  -     alendronate (Fosamax) 70 MG tablet; Take 1 tablet by mouth 1 (One) Time Per Week.    At risk for osteoporosis    BMI 22.0-22.9, adult      Return for Follow up with [place] on Dr. Cosby day 2 WEEKS WITH REPEAT XRAY.    Level of Risk: Moderate due to: undiagnosed new problem  MDM: Moderate Complexity  (Mod = 98433, High = 30994)    Thank you, for allowing me to continue to participate in the care of this patient.    Sincerely,  TIAN Brown

## 2020-08-05 ENCOUNTER — TELEPHONE (OUTPATIENT)
Dept: NEUROSURGERY | Facility: CLINIC | Age: 85
End: 2020-08-05

## 2020-08-05 ENCOUNTER — OFFICE VISIT (OUTPATIENT)
Dept: NEUROSURGERY | Facility: CLINIC | Age: 85
End: 2020-08-05

## 2020-08-05 ENCOUNTER — HOSPITAL ENCOUNTER (OUTPATIENT)
Dept: GENERAL RADIOLOGY | Facility: HOSPITAL | Age: 85
Discharge: HOME | End: 2020-08-05
Admitting: NURSE PRACTITIONER

## 2020-08-05 VITALS — HEIGHT: 66 IN | WEIGHT: 138 LBS | BODY MASS INDEX: 22.18 KG/M2

## 2020-08-05 DIAGNOSIS — Z91.89 AT RISK FOR OSTEOPOROSIS: ICD-10-CM

## 2020-08-05 DIAGNOSIS — S32.010D COMPRESSION FRACTURE OF L1 VERTEBRA WITH ROUTINE HEALING: Primary | ICD-10-CM

## 2020-08-05 DIAGNOSIS — Z91.89 AT HIGH RISK FOR FRACTURE: ICD-10-CM

## 2020-08-05 DIAGNOSIS — M89.9 DISORDER OF BONE, UNSPECIFIED: ICD-10-CM

## 2020-08-05 DIAGNOSIS — S32.010A CLOSED COMPRESSION FRACTURE OF BODY OF L1 VERTEBRA (HCC): ICD-10-CM

## 2020-08-05 PROCEDURE — 99214 OFFICE O/P EST MOD 30 MIN: CPT | Performed by: NURSE PRACTITIONER

## 2020-08-05 PROCEDURE — 72100 X-RAY EXAM L-S SPINE 2/3 VWS: CPT

## 2020-08-05 RX ORDER — HYDROCODONE BITARTRATE AND ACETAMINOPHEN 5; 325 MG/1; MG/1
1 TABLET ORAL EVERY 6 HOURS PRN
Qty: 40 TABLET | Refills: 0 | Status: SHIPPED | OUTPATIENT
Start: 2020-08-05 | End: 2020-08-19 | Stop reason: SDUPTHER

## 2020-08-05 RX ORDER — ALENDRONATE SODIUM 70 MG/1
70 TABLET ORAL WEEKLY
Qty: 4 TABLET | Refills: 5 | Status: SHIPPED | OUTPATIENT
Start: 2020-08-05 | End: 2021-11-11 | Stop reason: HOSPADM

## 2020-08-05 NOTE — TELEPHONE ENCOUNTER
Left vm with patients opal Escobedo of the appt date and time of 8/19/20 @3:15 with xray an hour before.

## 2020-08-05 NOTE — PATIENT INSTRUCTIONS
"DASH Eating Plan  DASH stands for \"Dietary Approaches to Stop Hypertension.\" The DASH eating plan is a healthy eating plan that has been shown to reduce high blood pressure (hypertension). It may also reduce your risk for type 2 diabetes, heart disease, and stroke. The DASH eating plan may also help with weight loss.  What are tips for following this plan?    General guidelines  · Avoid eating more than 2,300 mg (milligrams) of salt (sodium) a day. If you have hypertension, you may need to reduce your sodium intake to 1,500 mg a day.  · Limit alcohol intake to no more than 1 drink a day for nonpregnant women and 2 drinks a day for men. One drink equals 12 oz of beer, 5 oz of wine, or 1½ oz of hard liquor.  · Work with your health care provider to maintain a healthy body weight or to lose weight. Ask what an ideal weight is for you.  · Get at least 30 minutes of exercise that causes your heart to beat faster (aerobic exercise) most days of the week. Activities may include walking, swimming, or biking.  · Work with your health care provider or diet and nutrition specialist (dietitian) to adjust your eating plan to your individual calorie needs.  Reading food labels    · Check food labels for the amount of sodium per serving. Choose foods with less than 5 percent of the Daily Value of sodium. Generally, foods with less than 300 mg of sodium per serving fit into this eating plan.  · To find whole grains, look for the word \"whole\" as the first word in the ingredient list.  Shopping  · Buy products labeled as \"low-sodium\" or \"no salt added.\"  · Buy fresh foods. Avoid canned foods and premade or frozen meals.  Cooking  · Avoid adding salt when cooking. Use salt-free seasonings or herbs instead of table salt or sea salt. Check with your health care provider or pharmacist before using salt substitutes.  · Do not hassan foods. Cook foods using healthy methods such as baking, boiling, grilling, and broiling instead.  · Cook with " heart-healthy oils, such as olive, canola, soybean, or sunflower oil.  Meal planning  · Eat a balanced diet that includes:  ? 5 or more servings of fruits and vegetables each day. At each meal, try to fill half of your plate with fruits and vegetables.  ? Up to 6-8 servings of whole grains each day.  ? Less than 6 oz of lean meat, poultry, or fish each day. A 3-oz serving of meat is about the same size as a deck of cards. One egg equals 1 oz.  ? 2 servings of low-fat dairy each day.  ? A serving of nuts, seeds, or beans 5 times each week.  ? Heart-healthy fats. Healthy fats called Omega-3 fatty acids are found in foods such as flaxseeds and coldwater fish, like sardines, salmon, and mackerel.  · Limit how much you eat of the following:  ? Canned or prepackaged foods.  ? Food that is high in trans fat, such as fried foods.  ? Food that is high in saturated fat, such as fatty meat.  ? Sweets, desserts, sugary drinks, and other foods with added sugar.  ? Full-fat dairy products.  · Do not salt foods before eating.  · Try to eat at least 2 vegetarian meals each week.  · Eat more home-cooked food and less restaurant, buffet, and fast food.  · When eating at a restaurant, ask that your food be prepared with less salt or no salt, if possible.  What foods are recommended?  The items listed may not be a complete list. Talk with your dietitian about what dietary choices are best for you.  Grains  Whole-grain or whole-wheat bread. Whole-grain or whole-wheat pasta. Brown rice. Oatmeal. Quinoa. Bulgur. Whole-grain and low-sodium cereals. Yaima bread. Low-fat, low-sodium crackers. Whole-wheat flour tortillas.  Vegetables  Fresh or frozen vegetables (raw, steamed, roasted, or grilled). Low-sodium or reduced-sodium tomato and vegetable juice. Low-sodium or reduced-sodium tomato sauce and tomato paste. Low-sodium or reduced-sodium canned vegetables.  Fruits  All fresh, dried, or frozen fruit. Canned fruit in natural juice (without  added sugar).  Meat and other protein foods  Skinless chicken or turkey. Ground chicken or turkey. Pork with fat trimmed off. Fish and seafood. Egg whites. Dried beans, peas, or lentils. Unsalted nuts, nut butters, and seeds. Unsalted canned beans. Lean cuts of beef with fat trimmed off. Low-sodium, lean deli meat.  Dairy  Low-fat (1%) or fat-free (skim) milk. Fat-free, low-fat, or reduced-fat cheeses. Nonfat, low-sodium ricotta or cottage cheese. Low-fat or nonfat yogurt. Low-fat, low-sodium cheese.  Fats and oils  Soft margarine without trans fats. Vegetable oil. Low-fat, reduced-fat, or light mayonnaise and salad dressings (reduced-sodium). Canola, safflower, olive, soybean, and sunflower oils. Avocado.  Seasoning and other foods  Herbs. Spices. Seasoning mixes without salt. Unsalted popcorn and pretzels. Fat-free sweets.  What foods are not recommended?  The items listed may not be a complete list. Talk with your dietitian about what dietary choices are best for you.  Grains  Baked goods made with fat, such as croissants, muffins, or some breads. Dry pasta or rice meal packs.  Vegetables  Creamed or fried vegetables. Vegetables in a cheese sauce. Regular canned vegetables (not low-sodium or reduced-sodium). Regular canned tomato sauce and paste (not low-sodium or reduced-sodium). Regular tomato and vegetable juice (not low-sodium or reduced-sodium). Pickles. Olives.  Fruits  Canned fruit in a light or heavy syrup. Fried fruit. Fruit in cream or butter sauce.  Meat and other protein foods  Fatty cuts of meat. Ribs. Fried meat. Hernandez. Sausage. Bologna and other processed lunch meats. Salami. Fatback. Hotdogs. Bratwurst. Salted nuts and seeds. Canned beans with added salt. Canned or smoked fish. Whole eggs or egg yolks. Chicken or turkey with skin.  Dairy  Whole or 2% milk, cream, and half-and-half. Whole or full-fat cream cheese. Whole-fat or sweetened yogurt. Full-fat cheese. Nondairy creamers. Whipped toppings.  Processed cheese and cheese spreads.  Fats and oils  Butter. Stick margarine. Lard. Shortening. Ghee. Hernandez fat. Tropical oils, such as coconut, palm kernel, or palm oil.  Seasoning and other foods  Salted popcorn and pretzels. Onion salt, garlic salt, seasoned salt, table salt, and sea salt. Worcestershire sauce. Tartar sauce. Barbecue sauce. Teriyaki sauce. Soy sauce, including reduced-sodium. Steak sauce. Canned and packaged gravies. Fish sauce. Oyster sauce. Cocktail sauce. Horseradish that you find on the shelf. Ketchup. Mustard. Meat flavorings and tenderizers. Bouillon cubes. Hot sauce and Tabasco sauce. Premade or packaged marinades. Premade or packaged taco seasonings. Relishes. Regular salad dressings.  Where to find more information:  · National Heart, Lung, and Blood [place]: www.nhlbi.nih.gov  · American Heart Association: www.heart.org  Summary  · The DASH eating plan is a healthy eating plan that has been shown to reduce high blood pressure (hypertension). It may also reduce your risk for type 2 diabetes, heart disease, and stroke.  · With the DASH eating plan, you should limit salt (sodium) intake to 2,300 mg a day. If you have hypertension, you may need to reduce your sodium intake to 1,500 mg a day.  · When on the DASH eating plan, aim to eat more fresh fruits and vegetables, whole grains, lean proteins, low-fat dairy, and heart-healthy fats.  · Work with your health care provider or diet and nutrition specialist (dietitian) to adjust your eating plan to your individual calorie needs.  This information is not intended to replace advice given to you by your health care provider. Make sure you discuss any questions you have with your health care provider.  Document Released: 12/06/2012 Document Revised: 11/30/2018 Document Reviewed: 12/11/2017  Elsevier Patient Education © 2020 [employer].      Smoking Tobacco Information, Adult  Smoking tobacco can be harmful to your health. Tobacco contains a  poisonous (toxic), colorless chemical called nicotine. Nicotine is addictive. It changes the brain and can make it hard to stop smoking. Tobacco also has other toxic chemicals that can hurt your body and raise your risk of many cancers.  How can smoking tobacco affect me?  Smoking tobacco puts you at risk for:  · Cancer. Smoking is most commonly associated with lung cancer, but can also lead to cancer in other parts of the body.  · Chronic obstructive pulmonary disease (COPD). This is a long-term lung condition that makes it hard to breathe. It also gets worse over time.  · High blood pressure (hypertension), heart disease, stroke, or heart attack.  · Lung infections, such as pneumonia.  · Cataracts. This is when the lenses in the eyes become clouded.  · Digestive problems. This may include peptic ulcers, heartburn, and gastroesophageal reflux disease (GERD).  · Oral health problems, such as gum disease and tooth loss.  · Loss of taste and smell.  Smoking can affect your appearance by causing:  · Wrinkles.  · Yellow or stained teeth, fingers, and fingernails.  Smoking tobacco can also affect your social life, because:  · It may be challenging to find places to smoke when away from home. Many workplaces, restaurants, hotels, and public places are tobacco-free.  · Smoking is expensive. This is due to the cost of tobacco and the long-term costs of treating health problems from smoking.  · Secondhand smoke may affect those around you. Secondhand smoke can cause lung cancer, breathing problems, and heart disease. Children of smokers have a higher risk for:  ? Sudden infant death syndrome (SIDS).  ? Ear infections.  ? Lung infections.  If you currently smoke tobacco, quitting now can help you:  · Lead a longer and healthier life.  · Look, smell, breathe, and feel better over time.  · Save money.  · Protect others from the harms of secondhand smoke.  What actions can I take to prevent health problems?  Quit smoking    · Do  not start smoking. Quit if you already do.  · Make a plan to quit smoking and commit to it. Look for programs to help you and ask your health care provider for recommendations and ideas.  · Set a date and write down all the reasons you want to quit.  · Let your friends and family know you are quitting so they can help and support you. Consider finding friends who also want to quit. It can be easier to quit with someone else, so that you can support each other.  · Talk with your health care provider about using nicotine replacement medicines to help you quit, such as gum, lozenges, patches, sprays, or pills.  · Do not replace cigarette smoking with electronic cigarettes, which are commonly called e-cigarettes. The safety of e-cigarettes is not known, and some may contain harmful chemicals.  · If you try to quit but return to smoking, stay positive. It is common to slip up when you first quit, so take it one day at a time.  · Be prepared for cravings. When you feel the urge to smoke, chew gum or suck on hard candy.  Lifestyle  · Stay busy and take care of your body.  · Drink enough fluid to keep your urine pale yellow.  · Get plenty of exercise and eat a healthy diet. This can help prevent weight gain after quitting.  · Monitor your eating habits. Quitting smoking can cause you to have a larger appetite than when you smoke.  · Find ways to relax. Go out with friends or family to a movie or a restaurant where people do not smoke.  · Ask your health care provider about having regular tests (screenings) to check for cancer. This may include blood tests, imaging tests, and other tests.  · Find ways to manage your stress, such as meditation, yoga, or exercise.  Where to find support  To get support to quit smoking, consider:  · Asking your health care provider for more information and resources.  · Taking classes to learn more about quitting smoking.  · Looking for local organizations that offer resources about quitting  smoking.  · Joining a support group for people who want to quit smoking in your local community.  · Calling the smokefree.gov counselor helpline: 1-800-Quit-Now (1-405.782.4666)  Where to find more information  You may find more information about quitting smoking from:  · HelpGuide.org: www.helpguide.org  · Smokefree.gov: smokefree.gov  · American Lung Association: www.lung.org  Contact a health care provider if you:  · Have problems breathing.  · Notice that your lips, nose, or fingers turn blue.  · Have chest pain.  · Are coughing up blood.  · Feel faint or you pass out.  · Have other health changes that cause you to worry.  Summary  · Smoking tobacco can negatively affect your health, the health of those around you, your finances, and your social life.  · Do not start smoking. Quit if you already do. If you need help quitting, ask your health care provider.  · Think about joining a support group for people who want to quit smoking in your local community. There are many effective programs that will help you to quit this behavior.  This information is not intended to replace advice given to you by your health care provider. Make sure you discuss any questions you have with your health care provider.  Document Released: 01/02/2018 Document Revised: 02/06/2019 Document Reviewed: 01/02/2018  Elsevier Patient Education © 2020 Elsevier Inc.      Advance Care Planning and Advance Directives     You make decisions on a daily basis - decisions about where you want to live, your career, your home, your life. Perhaps one of the most important decisions you face is your choice for future medical care. Take time to talk with your family and your healthcare team and start planning today.  Advance Care Planning is a process that can help you:  · Understand possible future healthcare decisions in light of your own experiences  · Reflect on those decision in light of your goals and values  · Discuss your decisions with those  closest to you and the healthcare professionals that care for you  · Make a plan by creating a document that reflects your wishes    Surrogate Decision Maker  In the event of a medical emergency, which has left you unable to communicate or to make your own decisions, you would need someone to make decisions for you.  It is important to discuss your preferences for medical treatment with this person while you are in good health.     Qualities of a surrogate decision maker:  • Willing to take on this role and responsibility  • Knows what you want for future medical care  • Willing to follow your wishes even if they don't agree with them  • Able to make difficult medical decisions under stressful circumstances    Advance Directives  These are legal documents you can create that will guide your healthcare team and decision maker(s) when needed. These documents can be stored in the electronic medical record.    · Living Will - a legal document to guide your care if you have a terminal condition or a serious illness and are unable to communicate. States vary by statute in document names/types, but most forms may include one or more of the following:        -  Directions regarding life-prolonging treatments        -  Directions regarding artificially provided nutrition/hydration        -  Choosing a healthcare decision maker        -  Direction regarding organ/tissue donation    · Durable Power of  for Healthcare - this document names an -in-fact to make medical decisions for you, but it may also allow this person to make personal and financial decisions for you. Please seek the advice of an  if you need this type of document.    **Advance Directives are not required and no one may discriminate against you if you do not sign one.    Medical Orders  Many states allow specific forms/orders signed by your physician to record your wishes for medical treatment in your current state of health. This form,  signed in personal communication with your physician, addresses resuscitation and other medical interventions that you may or may not want.      For more information or to schedule a time with a [facility] Advance Care Planning Facilitator contact: [facility] or call [phone] and someone will contact you directly.    Osteoporosis    Osteoporosis is thinning and loss of density in your bones. Osteoporosis makes bones more brittle and fragile and more likely to break (fracture). Over time, osteoporosis can cause your bones to become so weak that they fracture after a minor fall. Bones in the hip, wrist, and spine are most likely to fracture due to osteoporosis.  What are the causes?  The exact cause of this condition is not known.  What increases the risk?  You may be at greater risk for osteoporosis if you:  · Have a family history of the condition.  · Have poor nutrition.  · Use steroid medicines, such as prednisone.  · Are female.  · Are age 50 or older.  · Smoke or have a history of smoking.  · Are not physically active (are sedentary).  · Are white () or of  descent.  · Have a small body frame.  · Take certain medicines, such as antiseizure medicines.  What are the signs or symptoms?  A fracture might be the first sign of osteoporosis, especially if the fracture results from a fall or injury that usually would not cause a bone to break. Other signs and symptoms include:  · Pain in the neck or low back.  · Stooped posture.  · Loss of height.  How is this diagnosed?  This condition may be diagnosed based on:  · Your medical history.  · A physical exam.  · A bone mineral density test, also called a DXA or DEXA test (dual-energy X-ray absorptiometry test). This test uses X-rays to measure the amount of minerals in your bones.  How is this treated?  The goal of treatment is to strengthen your bones and lower your risk for a fracture. Treatment may involve:  · Making lifestyle changes,  such as:  ? Including foods with more calcium and vitamin D in your diet.  ? Doing weight-bearing and muscle-strengthening exercises.  ? Stopping tobacco use.  ? Limiting alcohol intake.  · Taking medicine to slow the process of bone loss or to increase bone density.  · Taking daily supplements of calcium and vitamin D.  · Taking hormone replacement medicines, such as estrogen for women and testosterone for men.  · Monitoring your levels of calcium and vitamin D.  Follow these instructions at home:    Activity  · Exercise as told by your health care provider. Ask your health care provider what exercises and activities are safe for you. You should do:  ? Exercises that make you work against gravity (weight-bearing exercises), such as mohit chi, yoga, or walking.  ? Exercises to strengthen muscles, such as lifting weights.  Lifestyle  · Limit alcohol intake to no more than 1 drink a day for nonpregnant women and 2 drinks a day for men. One drink equals 12 oz of beer, 5 oz of wine, or 1½ oz of hard liquor.  · Do not use any products that contain nicotine or tobacco, such as cigarettes and e-cigarettes. If you need help quitting, ask your health care provider.  Preventing falls  · Use devices to help you move around (mobility aids) as needed, such as canes, walkers, scooters, or crutches.  · Keep rooms well-lit and clutter-free.  · Remove tripping hazards from walkways, including cords and throw rugs.  · Install grab bars in bathrooms and safety rails on stairs.  · Use rubber mats in the bathroom and other areas that are often wet or slippery.  · Wear closed-toe shoes that fit well and support your feet. Wear shoes that have rubber soles or low heels.  · Review your medicines with your health care provider. Some medicines can cause dizziness or changes in blood pressure, which can increase your risk of falling.  General instructions  · Include calcium and vitamin D in your diet. Calcium is important for bone health, and  vitamin D helps your body to absorb calcium. Good sources of calcium and vitamin D include:  ? Certain fatty fish, such as salmon and tuna.  ? Products that have calcium and vitamin D added to them (fortified products), such as fortified cereals.  ? Egg yolks.  ? Cheese.  ? Liver.  · Take over-the-counter and prescription medicines only as told by your health care provider.  · Keep all follow-up visits as told by your health care provider. This is important.  Contact a health care provider if:  · You have never been screened for osteoporosis and you are:  ? A woman who is age 65 or older.  ? A man who is age 70 or older.  Get help right away if:  · You fall or injure yourself.  Summary  · Osteoporosis is thinning and loss of density in your bones. This makes bones more brittle and fragile and more likely to break (fracture),even with minor falls.  · The goal of treatment is to strengthen your bones and reduce your risk for a fracture.  · Include calcium and vitamin D in your diet. Calcium is important for bone health, and vitamin D helps your body to absorb calcium.  · Talk with your health care provider about screening for osteoporosis if you are a woman who is age 65 or older, or a man who is age 70 or older.  This information is not intended to replace advice given to you by your health care provider. Make sure you discuss any questions you have with your health care provider.  Document Released: 09/27/2006 Document Revised: 11/30/2018 Document Reviewed: 10/12/2018  [employer] Patient Education © 2020 [employer]    Alendronate tablets  What is this medicine?  ALENDRONATE (a HARRY droe cortez) slows calcium loss from bones. It helps to make normal healthy bone and to slow bone loss in people with Paget's disease and osteoporosis. It may be used in others at risk for bone loss.  This medicine may be used for other purposes; ask your health care provider or pharmacist if you have questions.  COMMON BRAND NAME(S):  Fosamax  What should I tell my health care provider before I take this medicine?  They need to know if you have any of these conditions:  · dental disease  · esophagus, stomach, or intestine problems, like acid reflux or GERD  · kidney disease  · low blood calcium  · low vitamin D  · problems sitting or standing 30 minutes  · trouble swallowing  · an unusual or allergic reaction to alendronate, other medicines, foods, dyes, or preservatives  · pregnant or trying to get pregnant  · breast-feeding  How should I use this medicine?  You must take this medicine exactly as directed or you will lower the amount of the medicine you absorb into your body or you may cause yourself harm. Take this medicine by mouth first thing in the morning, after you are up for the day. Do not eat or drink anything before you take your medicine. Swallow the tablet with a full glass (6 to 8 fluid ounces) of plain water. Do not take this medicine with any other drink. Do not chew or crush the tablet. After taking this medicine, do not eat breakfast, drink, or take any medicines or vitamins for at least 30 minutes. Sit or stand up for at least 30 minutes after you take this medicine; do not lie down. Do not take your medicine more often than directed.  Talk to your pediatrician regarding the use of this medicine in children. Special care may be needed.  Overdosage: If you think you have taken too much of this medicine contact a poison control center or emergency room at once.  NOTE: This medicine is only for you. Do not share this medicine with others.  What if I miss a dose?  If you miss a dose, do not take it later in the day. Continue your normal schedule starting the next morning. Do not take double or extra doses.  What may interact with this medicine?  · aluminum hydroxide  · antacids  · aspirin  · calcium supplements  · drugs for inflammation like ibuprofen, naproxen, and others  · iron supplements  · magnesium supplements  · vitamins  with minerals  This list may not describe all possible interactions. Give your health care provider a list of all the medicines, herbs, non-prescription drugs, or dietary supplements you use. Also tell them if you smoke, drink alcohol, or use illegal drugs. Some items may interact with your medicine.  What should I watch for while using this medicine?  Visit your doctor or health care professional for regular checks ups. It may be some time before you see benefit from this medicine. Do not stop taking your medicine except on your doctor's advice. Your doctor or health care professional may order blood tests and other tests to see how you are doing.  You should make sure you get enough calcium and vitamin D while you are taking this medicine, unless your doctor tells you not to. Discuss the foods you eat and the vitamins you take with your health care professional.  Some people who take this medicine have severe bone, joint, and/or muscle pain. This medicine may also increase your risk for a broken thigh bone. Tell your doctor right away if you have pain in your upper leg or groin. Tell your doctor if you have any pain that does not go away or that gets worse.  This medicine can make you more sensitive to the sun. If you get a rash while taking this medicine, sunlight may cause the rash to get worse. Keep out of the sun. If you cannot avoid being in the sun, wear protective clothing and use sunscreen. Do not use sun lamps or tanning beds/booths.  What side effects may I notice from receiving this medicine?  Side effects that you should report to your doctor or health care professional as soon as possible:  · allergic reactions like skin rash, itching or hives, swelling of the face, lips, or tongue  · black or tarry stools  · bone, muscle or joint pain  · changes in vision  · chest pain  · heartburn or stomach pain  · jaw pain, especially after dental work  · pain or trouble when swallowing  · redness, blistering,  peeling or loosening of the skin, including inside the mouth  Side effects that usually do not require medical attention (report to your doctor or health care professional if they continue or are bothersome):  · changes in taste  · diarrhea or constipation  · eye pain or itching  · headache  · nausea or vomiting  · stomach gas or fullness  This list may not describe all possible side effects. Call your doctor for medical advice about side effects. You may report side effects to FDA at 6-148-ZFG-2883.  Where should I keep my medicine?  Keep out of the reach of children.  Store at room temperature of 15 and 30 degrees C (59 and 86 degrees F). Throw away any unused medicine after the expiration date.  NOTE: This sheet is a summary. It may not cover all possible information. If you have questions about this medicine, talk to your doctor, pharmacist, or health care provider.  © 2020 [employer]vier/[employer] (2012-06-15 08:56:09)

## 2020-08-19 ENCOUNTER — OFFICE VISIT (OUTPATIENT)
Dept: NEUROSURGERY | Facility: CLINIC | Age: 85
End: 2020-08-19

## 2020-08-19 ENCOUNTER — HOSPITAL ENCOUNTER (OUTPATIENT)
Dept: GENERAL RADIOLOGY | Facility: HOSPITAL | Age: 85
Discharge: HOME | End: 2020-08-19
Admitting: NURSE PRACTITIONER

## 2020-08-19 ENCOUNTER — HOSPITAL ENCOUNTER (OUTPATIENT)
Dept: BONE DENSITY | Facility: HOSPITAL | Age: 85
Discharge: HOME | End: 2020-08-19

## 2020-08-19 VITALS — WEIGHT: 138 LBS | HEIGHT: 66 IN | BODY MASS INDEX: 22.18 KG/M2

## 2020-08-19 DIAGNOSIS — S32.010D COMPRESSION FRACTURE OF L1 VERTEBRA WITH ROUTINE HEALING: Primary | ICD-10-CM

## 2020-08-19 DIAGNOSIS — M81.0 OSTEOPOROSIS, UNSPECIFIED OSTEOPOROSIS TYPE, UNSPECIFIED PATHOLOGICAL FRACTURE PRESENCE: ICD-10-CM

## 2020-08-19 DIAGNOSIS — Z91.89 AT HIGH RISK FOR FRACTURE: ICD-10-CM

## 2020-08-19 DIAGNOSIS — S32.010D COMPRESSION FRACTURE OF L1 VERTEBRA WITH ROUTINE HEALING: ICD-10-CM

## 2020-08-19 PROCEDURE — 72100 X-RAY EXAM L-S SPINE 2/3 VWS: CPT

## 2020-08-19 PROCEDURE — 77080 DXA BONE DENSITY AXIAL: CPT

## 2020-08-19 PROCEDURE — 99214 OFFICE O/P EST MOD 30 MIN: CPT | Performed by: NURSE PRACTITIONER

## 2020-08-19 RX ORDER — HYDROCODONE BITARTRATE AND ACETAMINOPHEN 5; 325 MG/1; MG/1
1 TABLET ORAL 2 TIMES DAILY
Qty: 30 TABLET | Refills: 0 | Status: ON HOLD | OUTPATIENT
Start: 2020-08-19 | End: 2021-11-08

## 2020-08-19 NOTE — PATIENT INSTRUCTIONS
"DASH Eating Plan  DASH stands for \"Dietary Approaches to Stop Hypertension.\" The DASH eating plan is a healthy eating plan that has been shown to reduce high blood pressure (hypertension). It may also reduce your risk for type 2 diabetes, heart disease, and stroke. The DASH eating plan may also help with weight loss.  What are tips for following this plan?    General guidelines  · Avoid eating more than 2,300 mg (milligrams) of salt (sodium) a day. If you have hypertension, you may need to reduce your sodium intake to 1,500 mg a day.  · Limit alcohol intake to no more than 1 drink a day for nonpregnant women and 2 drinks a day for men. One drink equals 12 oz of beer, 5 oz of wine, or 1½ oz of hard liquor.  · Work with your health care provider to maintain a healthy body weight or to lose weight. Ask what an ideal weight is for you.  · Get at least 30 minutes of exercise that causes your heart to beat faster (aerobic exercise) most days of the week. Activities may include walking, swimming, or biking.  · Work with your health care provider or diet and nutrition specialist (dietitian) to adjust your eating plan to your individual calorie needs.  Reading food labels    · Check food labels for the amount of sodium per serving. Choose foods with less than 5 percent of the Daily Value of sodium. Generally, foods with less than 300 mg of sodium per serving fit into this eating plan.  · To find whole grains, look for the word \"whole\" as the first word in the ingredient list.  Shopping  · Buy products labeled as \"low-sodium\" or \"no salt added.\"  · Buy fresh foods. Avoid canned foods and premade or frozen meals.  Cooking  · Avoid adding salt when cooking. Use salt-free seasonings or herbs instead of table salt or sea salt. Check with your health care provider or pharmacist before using salt substitutes.  · Do not hassan foods. Cook foods using healthy methods such as baking, boiling, grilling, and broiling instead.  · Cook with " heart-healthy oils, such as olive, canola, soybean, or sunflower oil.  Meal planning  · Eat a balanced diet that includes:  ? 5 or more servings of fruits and vegetables each day. At each meal, try to fill half of your plate with fruits and vegetables.  ? Up to 6-8 servings of whole grains each day.  ? Less than 6 oz of lean meat, poultry, or fish each day. A 3-oz serving of meat is about the same size as a deck of cards. One egg equals 1 oz.  ? 2 servings of low-fat dairy each day.  ? A serving of nuts, seeds, or beans 5 times each week.  ? Heart-healthy fats. Healthy fats called Omega-3 fatty acids are found in foods such as flaxseeds and coldwater fish, like sardines, salmon, and mackerel.  · Limit how much you eat of the following:  ? Canned or prepackaged foods.  ? Food that is high in trans fat, such as fried foods.  ? Food that is high in saturated fat, such as fatty meat.  ? Sweets, desserts, sugary drinks, and other foods with added sugar.  ? Full-fat dairy products.  · Do not salt foods before eating.  · Try to eat at least 2 vegetarian meals each week.  · Eat more home-cooked food and less restaurant, buffet, and fast food.  · When eating at a restaurant, ask that your food be prepared with less salt or no salt, if possible.  What foods are recommended?  The items listed may not be a complete list. Talk with your dietitian about what dietary choices are best for you.  Grains  Whole-grain or whole-wheat bread. Whole-grain or whole-wheat pasta. Brown rice. Oatmeal. Quinoa. Bulgur. Whole-grain and low-sodium cereals. Yaima bread. Low-fat, low-sodium crackers. Whole-wheat flour tortillas.  Vegetables  Fresh or frozen vegetables (raw, steamed, roasted, or grilled). Low-sodium or reduced-sodium tomato and vegetable juice. Low-sodium or reduced-sodium tomato sauce and tomato paste. Low-sodium or reduced-sodium canned vegetables.  Fruits  All fresh, dried, or frozen fruit. Canned fruit in natural juice (without  added sugar).  Meat and other protein foods  Skinless chicken or turkey. Ground chicken or turkey. Pork with fat trimmed off. Fish and seafood. Egg whites. Dried beans, peas, or lentils. Unsalted nuts, nut butters, and seeds. Unsalted canned beans. Lean cuts of beef with fat trimmed off. Low-sodium, lean deli meat.  Dairy  Low-fat (1%) or fat-free (skim) milk. Fat-free, low-fat, or reduced-fat cheeses. Nonfat, low-sodium ricotta or cottage cheese. Low-fat or nonfat yogurt. Low-fat, low-sodium cheese.  Fats and oils  Soft margarine without trans fats. Vegetable oil. Low-fat, reduced-fat, or light mayonnaise and salad dressings (reduced-sodium). Canola, safflower, olive, soybean, and sunflower oils. Avocado.  Seasoning and other foods  Herbs. Spices. Seasoning mixes without salt. Unsalted popcorn and pretzels. Fat-free sweets.  What foods are not recommended?  The items listed may not be a complete list. Talk with your dietitian about what dietary choices are best for you.  Grains  Baked goods made with fat, such as croissants, muffins, or some breads. Dry pasta or rice meal packs.  Vegetables  Creamed or fried vegetables. Vegetables in a cheese sauce. Regular canned vegetables (not low-sodium or reduced-sodium). Regular canned tomato sauce and paste (not low-sodium or reduced-sodium). Regular tomato and vegetable juice (not low-sodium or reduced-sodium). Pickles. Olives.  Fruits  Canned fruit in a light or heavy syrup. Fried fruit. Fruit in cream or butter sauce.  Meat and other protein foods  Fatty cuts of meat. Ribs. Fried meat. Hernandez. Sausage. Bologna and other processed lunch meats. Salami. Fatback. Hotdogs. Bratwurst. Salted nuts and seeds. Canned beans with added salt. Canned or smoked fish. Whole eggs or egg yolks. Chicken or turkey with skin.  Dairy  Whole or 2% milk, cream, and half-and-half. Whole or full-fat cream cheese. Whole-fat or sweetened yogurt. Full-fat cheese. Nondairy creamers. Whipped toppings.  Processed cheese and cheese spreads.  Fats and oils  Butter. Stick margarine. Lard. Shortening. Ghee. Hernandez fat. Tropical oils, such as coconut, palm kernel, or palm oil.  Seasoning and other foods  Salted popcorn and pretzels. Onion salt, garlic salt, seasoned salt, table salt, and sea salt. Worcestershire sauce. Tartar sauce. Barbecue sauce. Teriyaki sauce. Soy sauce, including reduced-sodium. Steak sauce. Canned and packaged gravies. Fish sauce. Oyster sauce. Cocktail sauce. Horseradish that you find on the shelf. Ketchup. Mustard. Meat flavorings and tenderizers. Bouillon cubes. Hot sauce and Tabasco sauce. Premade or packaged marinades. Premade or packaged taco seasonings. Relishes. Regular salad dressings.  Where to find more information:  · National Heart, Lung, and Blood [place]: www.nhlbi.nih.gov  · American Heart Association: www.heart.org  Summary  · The DASH eating plan is a healthy eating plan that has been shown to reduce high blood pressure (hypertension). It may also reduce your risk for type 2 diabetes, heart disease, and stroke.  · With the DASH eating plan, you should limit salt (sodium) intake to 2,300 mg a day. If you have hypertension, you may need to reduce your sodium intake to 1,500 mg a day.  · When on the DASH eating plan, aim to eat more fresh fruits and vegetables, whole grains, lean proteins, low-fat dairy, and heart-healthy fats.  · Work with your health care provider or diet and nutrition specialist (dietitian) to adjust your eating plan to your individual calorie needs.  This information is not intended to replace advice given to you by your health care provider. Make sure you discuss any questions you have with your health care provider.  Document Released: 12/06/2012 Document Revised: 11/30/2018 Document Reviewed: 12/11/2017  Elsevier Patient Education © 2020 [employer].      Smoking Tobacco Information, Adult  Smoking tobacco can be harmful to your health. Tobacco contains a  poisonous (toxic), colorless chemical called nicotine. Nicotine is addictive. It changes the brain and can make it hard to stop smoking. Tobacco also has other toxic chemicals that can hurt your body and raise your risk of many cancers.  How can smoking tobacco affect me?  Smoking tobacco puts you at risk for:  · Cancer. Smoking is most commonly associated with lung cancer, but can also lead to cancer in other parts of the body.  · Chronic obstructive pulmonary disease (COPD). This is a long-term lung condition that makes it hard to breathe. It also gets worse over time.  · High blood pressure (hypertension), heart disease, stroke, or heart attack.  · Lung infections, such as pneumonia.  · Cataracts. This is when the lenses in the eyes become clouded.  · Digestive problems. This may include peptic ulcers, heartburn, and gastroesophageal reflux disease (GERD).  · Oral health problems, such as gum disease and tooth loss.  · Loss of taste and smell.  Smoking can affect your appearance by causing:  · Wrinkles.  · Yellow or stained teeth, fingers, and fingernails.  Smoking tobacco can also affect your social life, because:  · It may be challenging to find places to smoke when away from home. Many workplaces, restaurants, hotels, and public places are tobacco-free.  · Smoking is expensive. This is due to the cost of tobacco and the long-term costs of treating health problems from smoking.  · Secondhand smoke may affect those around you. Secondhand smoke can cause lung cancer, breathing problems, and heart disease. Children of smokers have a higher risk for:  ? Sudden infant death syndrome (SIDS).  ? Ear infections.  ? Lung infections.  If you currently smoke tobacco, quitting now can help you:  · Lead a longer and healthier life.  · Look, smell, breathe, and feel better over time.  · Save money.  · Protect others from the harms of secondhand smoke.  What actions can I take to prevent health problems?  Quit smoking    · Do  not start smoking. Quit if you already do.  · Make a plan to quit smoking and commit to it. Look for programs to help you and ask your health care provider for recommendations and ideas.  · Set a date and write down all the reasons you want to quit.  · Let your friends and family know you are quitting so they can help and support you. Consider finding friends who also want to quit. It can be easier to quit with someone else, so that you can support each other.  · Talk with your health care provider about using nicotine replacement medicines to help you quit, such as gum, lozenges, patches, sprays, or pills.  · Do not replace cigarette smoking with electronic cigarettes, which are commonly called e-cigarettes. The safety of e-cigarettes is not known, and some may contain harmful chemicals.  · If you try to quit but return to smoking, stay positive. It is common to slip up when you first quit, so take it one day at a time.  · Be prepared for cravings. When you feel the urge to smoke, chew gum or suck on hard candy.  Lifestyle  · Stay busy and take care of your body.  · Drink enough fluid to keep your urine pale yellow.  · Get plenty of exercise and eat a healthy diet. This can help prevent weight gain after quitting.  · Monitor your eating habits. Quitting smoking can cause you to have a larger appetite than when you smoke.  · Find ways to relax. Go out with friends or family to a movie or a restaurant where people do not smoke.  · Ask your health care provider about having regular tests (screenings) to check for cancer. This may include blood tests, imaging tests, and other tests.  · Find ways to manage your stress, such as meditation, yoga, or exercise.  Where to find support  To get support to quit smoking, consider:  · Asking your health care provider for more information and resources.  · Taking classes to learn more about quitting smoking.  · Looking for local organizations that offer resources about quitting  smoking.  · Joining a support group for people who want to quit smoking in your local community.  · Calling the smokefree.gov counselor helpline: 1-800-Quit-Now (1-795.785.5280)  Where to find more information  You may find more information about quitting smoking from:  · HelpGuide.org: www.helpguide.org  · Smokefree.gov: smokefree.gov  · American Lung Association: www.lung.org  Contact a health care provider if you:  · Have problems breathing.  · Notice that your lips, nose, or fingers turn blue.  · Have chest pain.  · Are coughing up blood.  · Feel faint or you pass out.  · Have other health changes that cause you to worry.  Summary  · Smoking tobacco can negatively affect your health, the health of those around you, your finances, and your social life.  · Do not start smoking. Quit if you already do. If you need help quitting, ask your health care provider.  · Think about joining a support group for people who want to quit smoking in your local community. There are many effective programs that will help you to quit this behavior.  This information is not intended to replace advice given to you by your health care provider. Make sure you discuss any questions you have with your health care provider.  Document Released: 01/02/2018 Document Revised: 02/06/2019 Document Reviewed: 01/02/2018  Elsevier Patient Education © 2020 [employer]vier Inc.      Advance Care Planning and Advance Directives     You make decisions on a daily basis - decisions about where you want to live, your career, your home, your life. Perhaps one of the most important decisions you face is your choice for future medical care. Take time to talk with your family and your healthcare team and start planning today.  Advance Care Planning is a process that can help you:  · Understand possible future healthcare decisions in light of your own experiences  · Reflect on those decision in light of your goals and values  · Discuss your decisions with those  closest to you and the healthcare professionals that care for you  · Make a plan by creating a document that reflects your wishes    Surrogate Decision Maker  In the event of a medical emergency, which has left you unable to communicate or to make your own decisions, you would need someone to make decisions for you.  It is important to discuss your preferences for medical treatment with this person while you are in good health.     Qualities of a surrogate decision maker:  • Willing to take on this role and responsibility  • Knows what you want for future medical care  • Willing to follow your wishes even if they don't agree with them  • Able to make difficult medical decisions under stressful circumstances    Advance Directives  These are legal documents you can create that will guide your healthcare team and decision maker(s) when needed. These documents can be stored in the electronic medical record.    · Living Will - a legal document to guide your care if you have a terminal condition or a serious illness and are unable to communicate. States vary by statute in document names/types, but most forms may include one or more of the following:        -  Directions regarding life-prolonging treatments        -  Directions regarding artificially provided nutrition/hydration        -  Choosing a healthcare decision maker        -  Direction regarding organ/tissue donation    · Durable Power of  for Healthcare - this document names an -in-fact to make medical decisions for you, but it may also allow this person to make personal and financial decisions for you. Please seek the advice of an  if you need this type of document.    **Advance Directives are not required and no one may discriminate against you if you do not sign one.    Medical Orders  Many states allow specific forms/orders signed by your physician to record your wishes for medical treatment in your current state of health. This form,  signed in personal communication with your physician, addresses resuscitation and other medical interventions that you may or may not want.      For more information or to schedule a time with a [facility] Advance Care Planning Facilitator contact: [facility] or call [phone] and someone will contact you directly.

## 2020-08-19 NOTE — PROGRESS NOTES
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 and hyperlipidemia.     She presents today for follow-up of lumbar back pain and a known L1 compression fracture which she sustained post mechanical fall from standing height on 7/24/2020.  BIRGIT: 42, 36.  Physical exam findings of global hyporeflexia otherwise neurologically intact.  Their imaging shown a stable appearing type A1, wedge shaped compression fracture at L1 from previous imaging on 8/5/2020.    Recommendations:  L1 compression fracture  Continue to wear TLSO brace at all times while out of bed.  Rx provided for a short course of Norco.  Etienne report reviewed.  No suspicious activity.  Benefits, risk, adverse effects, and use discussed.  Avoid NSAIDs  We will have her return in 1 month for reassessment.  X-rays of the lumbar spine upright and supine prior to arrival.  Continue to ambulate with a Rollator walker as a means avoid additional falls.    I advised the patient to call and return sooner for new or worsening complaints of weakness, paresthesias, gait disturbances, or any additional concerns.  Treatment options discussed in detail with Yudi and she voiced understanding.  Ms. Westbrook agrees with this plan of care.    Osteoporosis  At high risk for fracture   FRAX Fracture Risk Assessment Tool (https://www.walker.ac.uk/FRAX/tool.aspx?country=9)  Based on the  FRAX score Yudi has a ten year probability of a major osteoporotic fracture of 55% and a hip fracture of 46%.    Ms. Westbrook is taking Fosamax as prescribed.  She denies concern for adverse effects with this medication.  I recommended she continue as previously prescribed.    Yudi was seen today for back pain.    Diagnoses and all orders for this visit:    Compression fracture of L1 vertebra with routine healing  -     XR Spine Lumbar 2 or 3 View; Future  -     HYDROcodone-acetaminophen (NORCO) 5-325 MG per tablet; Take 1 tablet by mouth 2 (two) times a day.    Osteoporosis, unspecified osteoporosis type, unspecified pathological fracture presence    At high risk for fracture      Return in about 1 month (around 9/19/2020) for Follow up with [place] with imaging.    Level of Risk: Moderate due to: undiagnosed new problem  MDM: Moderate Complexity  (Mod = 76067, High = 28036)    Thank you, for allowing me to continue to participate in the care of this patient.    Sincerely,  TIAN Brown

## 2020-09-23 ENCOUNTER — HOSPITAL ENCOUNTER (OUTPATIENT)
Dept: GENERAL RADIOLOGY | Facility: HOSPITAL | Age: 85
Discharge: HOME | End: 2020-09-23
Admitting: NURSE PRACTITIONER

## 2020-09-23 ENCOUNTER — OFFICE VISIT (OUTPATIENT)
Dept: NEUROSURGERY | Facility: CLINIC | Age: 85
End: 2020-09-23

## 2020-09-23 VITALS — WEIGHT: 140 LBS | HEIGHT: 66 IN | BODY MASS INDEX: 22.5 KG/M2

## 2020-09-23 DIAGNOSIS — M81.0 OSTEOPOROSIS, UNSPECIFIED OSTEOPOROSIS TYPE, UNSPECIFIED PATHOLOGICAL FRACTURE PRESENCE: ICD-10-CM

## 2020-09-23 DIAGNOSIS — S32.010D COMPRESSION FRACTURE OF L1 VERTEBRA WITH ROUTINE HEALING: ICD-10-CM

## 2020-09-23 DIAGNOSIS — S32.010D COMPRESSION FRACTURE OF L1 VERTEBRA WITH ROUTINE HEALING: Primary | ICD-10-CM

## 2020-09-23 DIAGNOSIS — Z91.89 AT HIGH RISK FOR FRACTURE: ICD-10-CM

## 2020-09-23 PROCEDURE — 72100 X-RAY EXAM L-S SPINE 2/3 VWS: CPT

## 2020-09-23 PROCEDURE — 99214 OFFICE O/P EST MOD 30 MIN: CPT | Performed by: NURSE PRACTITIONER

## 2020-09-23 RX ORDER — WARFARIN SODIUM 5 MG/1
TABLET ORAL
COMMUNITY
Start: 2020-08-23 | End: 2022-03-07 | Stop reason: SDUPTHER
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
## 2021-02-03 ENCOUNTER — IMMUNIZATION (OUTPATIENT)
Dept: VACCINE CLINIC | Facility: HOSPITAL | Age: 86
End: 2021-02-03

## 2021-02-03 PROCEDURE — 0011A: CPT | Performed by: OBSTETRICS & GYNECOLOGY

## 2021-02-03 PROCEDURE — 91301 HC SARSCO02 VAC 100MCG/0.5ML IM: CPT | Performed by: OBSTETRICS & GYNECOLOGY

## 2021-03-03 ENCOUNTER — IMMUNIZATION (OUTPATIENT)
Dept: VACCINE CLINIC | Facility: HOSPITAL | Age: 86
End: 2021-03-03

## 2021-03-03 PROCEDURE — 91301 HC SARSCO02 VAC 100MCG/0.5ML IM: CPT | Performed by: OBSTETRICS & GYNECOLOGY

## 2021-03-03 PROCEDURE — 0012A: CPT | Performed by: OBSTETRICS & GYNECOLOGY

## 2021-11-07 ENCOUNTER — HOSPITAL ENCOUNTER (INPATIENT)
Facility: HOSPITAL | Age: 86
LOS: 4 days | Discharge: HOME HEALTH | End: 2021-11-11
Attending: EMERGENCY MEDICINE | Admitting: FAMILY MEDICINE

## 2021-11-07 ENCOUNTER — APPOINTMENT (OUTPATIENT)
Dept: CT IMAGING | Facility: HOSPITAL | Age: 86
End: 2021-11-07

## 2021-11-07 ENCOUNTER — APPOINTMENT (OUTPATIENT)
Dept: GENERAL RADIOLOGY | Facility: HOSPITAL | Age: 86
End: 2021-11-07

## 2021-11-07 DIAGNOSIS — R00.1 BRADYCARDIA: Primary | ICD-10-CM

## 2021-11-07 DIAGNOSIS — R07.9 CHEST PAIN, UNSPECIFIED TYPE: ICD-10-CM

## 2021-11-07 DIAGNOSIS — R00.1 SYMPTOMATIC BRADYCARDIA: ICD-10-CM

## 2021-11-07 DIAGNOSIS — R13.10 DYSPHAGIA: ICD-10-CM

## 2021-11-07 PROBLEM — E78.5 HYPERLIPIDEMIA: Status: ACTIVE | Noted: 2021-11-07

## 2021-11-07 LAB
ALBUMIN SERPL-MCNC: 4.6 G/DL (ref 3.5–5.2)
ALBUMIN/GLOB SERPL: 1.9 G/DL
ALP SERPL-CCNC: 57 U/L (ref 39–117)
ALT SERPL W P-5'-P-CCNC: 15 U/L (ref 1–33)
ANION GAP SERPL CALCULATED.3IONS-SCNC: 9 MMOL/L (ref 5–15)
AST SERPL-CCNC: 21 U/L (ref 1–32)
BASOPHILS # BLD AUTO: 0.06 10*3/MM3 (ref 0–0.2)
BASOPHILS NFR BLD AUTO: 1 % (ref 0–1.5)
BILIRUB SERPL-MCNC: 1.2 MG/DL (ref 0–1.2)
BUN SERPL-MCNC: 16 MG/DL (ref 8–23)
BUN/CREAT SERPL: 27.1 (ref 7–25)
CALCIUM SPEC-SCNC: 10 MG/DL (ref 8.6–10.5)
CHLORIDE SERPL-SCNC: 102 MMOL/L (ref 98–107)
CO2 SERPL-SCNC: 28 MMOL/L (ref 22–29)
CREAT SERPL-MCNC: 0.59 MG/DL (ref 0.57–1)
D DIMER PPP FEU-MCNC: <0.22 MG/L (FEU) (ref 0–0.5)
DEPRECATED RDW RBC AUTO: 44.4 FL (ref 37–54)
EOSINOPHIL # BLD AUTO: 0.09 10*3/MM3 (ref 0–0.4)
EOSINOPHIL NFR BLD AUTO: 1.5 % (ref 0.3–6.2)
ERYTHROCYTE [DISTWIDTH] IN BLOOD BY AUTOMATED COUNT: 13 % (ref 12.3–15.4)
GFR SERPL CREATININE-BSD FRML MDRD: 96 ML/MIN/1.73
GLOBULIN UR ELPH-MCNC: 2.4 GM/DL
GLUCOSE SERPL-MCNC: 106 MG/DL (ref 65–99)
HCT VFR BLD AUTO: 40.1 % (ref 34–46.6)
HGB BLD-MCNC: 13.5 G/DL (ref 12–15.9)
HOLD SPECIMEN: NORMAL
HOLD SPECIMEN: NORMAL
IMM GRANULOCYTES # BLD AUTO: 0.03 10*3/MM3 (ref 0–0.05)
IMM GRANULOCYTES NFR BLD AUTO: 0.5 % (ref 0–0.5)
INR PPP: 2.18 (ref 0.91–1.09)
LYMPHOCYTES # BLD AUTO: 1.62 10*3/MM3 (ref 0.7–3.1)
LYMPHOCYTES NFR BLD AUTO: 27.1 % (ref 19.6–45.3)
MCH RBC QN AUTO: 31.4 PG (ref 26.6–33)
MCHC RBC AUTO-ENTMCNC: 33.7 G/DL (ref 31.5–35.7)
MCV RBC AUTO: 93.3 FL (ref 79–97)
MONOCYTES # BLD AUTO: 0.61 10*3/MM3 (ref 0.1–0.9)
MONOCYTES NFR BLD AUTO: 10.2 % (ref 5–12)
NEUTROPHILS NFR BLD AUTO: 3.56 10*3/MM3 (ref 1.7–7)
NEUTROPHILS NFR BLD AUTO: 59.7 % (ref 42.7–76)
NRBC BLD AUTO-RTO: 0 /100 WBC (ref 0–0.2)
NT-PROBNP SERPL-MCNC: 1182 PG/ML (ref 0–1800)
PLATELET # BLD AUTO: 198 10*3/MM3 (ref 140–450)
PMV BLD AUTO: 10.7 FL (ref 6–12)
POTASSIUM SERPL-SCNC: 4.1 MMOL/L (ref 3.5–5.2)
PROT SERPL-MCNC: 7 G/DL (ref 6–8.5)
PROTHROMBIN TIME: 23.4 SECONDS (ref 11.9–14.6)
RBC # BLD AUTO: 4.3 10*6/MM3 (ref 3.77–5.28)
SARS-COV-2 RNA PNL SPEC NAA+PROBE: NOT DETECTED
SODIUM SERPL-SCNC: 139 MMOL/L (ref 136–145)
TROPONIN T SERPL-MCNC: <0.01 NG/ML (ref 0–0.03)
TROPONIN T SERPL-MCNC: <0.01 NG/ML (ref 0–0.03)
WBC # BLD AUTO: 5.97 10*3/MM3 (ref 3.4–10.8)
WHOLE BLOOD HOLD SPECIMEN: NORMAL
WHOLE BLOOD HOLD SPECIMEN: NORMAL

## 2021-11-07 PROCEDURE — 85025 COMPLETE CBC W/AUTO DIFF WBC: CPT | Performed by: EMERGENCY MEDICINE

## 2021-11-07 PROCEDURE — 85610 PROTHROMBIN TIME: CPT | Performed by: INTERNAL MEDICINE

## 2021-11-07 PROCEDURE — 84484 ASSAY OF TROPONIN QUANT: CPT | Performed by: EMERGENCY MEDICINE

## 2021-11-07 PROCEDURE — 25010000002 ONDANSETRON PER 1 MG: Performed by: EMERGENCY MEDICINE

## 2021-11-07 PROCEDURE — 93010 ELECTROCARDIOGRAM REPORT: CPT | Performed by: INTERNAL MEDICINE

## 2021-11-07 PROCEDURE — 80053 COMPREHEN METABOLIC PANEL: CPT | Performed by: EMERGENCY MEDICINE

## 2021-11-07 PROCEDURE — 0 IOPAMIDOL PER 1 ML: Performed by: EMERGENCY MEDICINE

## 2021-11-07 PROCEDURE — 71045 X-RAY EXAM CHEST 1 VIEW: CPT

## 2021-11-07 PROCEDURE — 83880 ASSAY OF NATRIURETIC PEPTIDE: CPT | Performed by: EMERGENCY MEDICINE

## 2021-11-07 PROCEDURE — 71275 CT ANGIOGRAPHY CHEST: CPT

## 2021-11-07 PROCEDURE — 87635 SARS-COV-2 COVID-19 AMP PRB: CPT | Performed by: EMERGENCY MEDICINE

## 2021-11-07 PROCEDURE — 99223 1ST HOSP IP/OBS HIGH 75: CPT | Performed by: EMERGENCY MEDICINE

## 2021-11-07 PROCEDURE — 93005 ELECTROCARDIOGRAM TRACING: CPT | Performed by: EMERGENCY MEDICINE

## 2021-11-07 PROCEDURE — 99285 EMERGENCY DEPT VISIT HI MDM: CPT

## 2021-11-07 PROCEDURE — 25010000002 FENTANYL CITRATE (PF) 50 MCG/ML SOLUTION: Performed by: EMERGENCY MEDICINE

## 2021-11-07 PROCEDURE — 85379 FIBRIN DEGRADATION QUANT: CPT | Performed by: EMERGENCY MEDICINE

## 2021-11-07 PROCEDURE — 25010000002 ONDANSETRON PER 1 MG: Performed by: INTERNAL MEDICINE

## 2021-11-07 RX ORDER — ATORVASTATIN CALCIUM 10 MG/1
10 TABLET, FILM COATED ORAL NIGHTLY
Status: DISCONTINUED | OUTPATIENT
Start: 2021-11-07 | End: 2021-11-11 | Stop reason: HOSPADM

## 2021-11-07 RX ORDER — LISINOPRIL 10 MG/1
10 TABLET ORAL
Status: DISCONTINUED | OUTPATIENT
Start: 2021-11-08 | End: 2021-11-11 | Stop reason: HOSPADM

## 2021-11-07 RX ORDER — FOLIC ACID 1 MG/1
1 TABLET ORAL 3 TIMES WEEKLY
Status: DISCONTINUED | OUTPATIENT
Start: 2021-11-08 | End: 2021-11-09

## 2021-11-07 RX ORDER — FENTANYL CITRATE 50 UG/ML
25 INJECTION, SOLUTION INTRAMUSCULAR; INTRAVENOUS ONCE
Status: COMPLETED | OUTPATIENT
Start: 2021-11-07 | End: 2021-11-07

## 2021-11-07 RX ORDER — SODIUM CHLORIDE 0.9 % (FLUSH) 0.9 %
10 SYRINGE (ML) INJECTION AS NEEDED
Status: DISCONTINUED | OUTPATIENT
Start: 2021-11-07 | End: 2021-11-09 | Stop reason: SDUPTHER

## 2021-11-07 RX ORDER — SODIUM CHLORIDE 0.9 % (FLUSH) 0.9 %
10 SYRINGE (ML) INJECTION EVERY 12 HOURS SCHEDULED
Status: DISCONTINUED | OUTPATIENT
Start: 2021-11-07 | End: 2021-11-11 | Stop reason: HOSPADM

## 2021-11-07 RX ORDER — CHOLECALCIFEROL (VITAMIN D3) 125 MCG
1000 CAPSULE ORAL DAILY
Status: DISCONTINUED | OUTPATIENT
Start: 2021-11-08 | End: 2021-11-11 | Stop reason: HOSPADM

## 2021-11-07 RX ORDER — ACETAMINOPHEN 325 MG/1
650 TABLET ORAL EVERY 4 HOURS PRN
Status: DISCONTINUED | OUTPATIENT
Start: 2021-11-07 | End: 2021-11-11 | Stop reason: HOSPADM

## 2021-11-07 RX ORDER — ACETAMINOPHEN 650 MG/1
650 SUPPOSITORY RECTAL EVERY 4 HOURS PRN
Status: DISCONTINUED | OUTPATIENT
Start: 2021-11-07 | End: 2021-11-11 | Stop reason: HOSPADM

## 2021-11-07 RX ORDER — SODIUM CHLORIDE 0.9 % (FLUSH) 0.9 %
10 SYRINGE (ML) INJECTION AS NEEDED
Status: DISCONTINUED | OUTPATIENT
Start: 2021-11-07 | End: 2021-11-11 | Stop reason: HOSPADM

## 2021-11-07 RX ORDER — ONDANSETRON 2 MG/ML
4 INJECTION INTRAMUSCULAR; INTRAVENOUS EVERY 6 HOURS PRN
Status: DISCONTINUED | OUTPATIENT
Start: 2021-11-07 | End: 2021-11-11 | Stop reason: HOSPADM

## 2021-11-07 RX ORDER — ONDANSETRON 2 MG/ML
4 INJECTION INTRAMUSCULAR; INTRAVENOUS ONCE
Status: COMPLETED | OUTPATIENT
Start: 2021-11-07 | End: 2021-11-07

## 2021-11-07 RX ADMIN — FENTANYL CITRATE 25 MCG: 50 INJECTION INTRAMUSCULAR; INTRAVENOUS at 09:32

## 2021-11-07 RX ADMIN — ACETAMINOPHEN 650 MG: 325 TABLET, FILM COATED ORAL at 19:59

## 2021-11-07 RX ADMIN — IOPAMIDOL 100 ML: 755 INJECTION, SOLUTION INTRAVENOUS at 10:36

## 2021-11-07 RX ADMIN — ONDANSETRON 4 MG: 2 INJECTION INTRAMUSCULAR; INTRAVENOUS at 19:58

## 2021-11-07 RX ADMIN — ONDANSETRON 4 MG: 2 INJECTION INTRAMUSCULAR; INTRAVENOUS at 09:27

## 2021-11-07 RX ADMIN — SODIUM CHLORIDE, PRESERVATIVE FREE 10 ML: 5 INJECTION INTRAVENOUS at 14:00

## 2021-11-07 RX ADMIN — ATORVASTATIN CALCIUM 10 MG: 10 TABLET, FILM COATED ORAL at 20:06

## 2021-11-07 NOTE — ED PROVIDER NOTES
Subjective   Patient is 89-year-old who is on Eliquis got history of A. fib came to the ER complaining of sharp chest pain no cough no congestion does not radiate anywhere else except for the jaw no nausea no vomiting associate with this no fever associate with this.  Does not appear to be in distress at this time.      Chest Pain  Pain location:  Substernal area  Pain quality: sharp    Pain radiates to:  L jaw  Pain severity:  Moderate  Onset quality:  Gradual  Timing:  Intermittent  Progression:  Waxing and waning  Chronicity:  New  Context: not breathing, not drug use, not intercourse, not lifting, not movement, not raising an arm, not at rest, not stress and not trauma    Relieved by:  Nothing  Worsened by:  Nothing  Ineffective treatments:  None tried  Associated symptoms: no abdominal pain, no AICD problem, no altered mental status, no anorexia, no anxiety, no back pain, no claudication, no cough, no diaphoresis, no dysphagia, no fatigue, no fever, no headache, no lower extremity edema, no nausea, no near-syncope, no orthopnea, no palpitations, no shortness of breath, no syncope, no vomiting and no weakness    Risk factors: high cholesterol and hypertension    Risk factors: no aortic disease, no coronary artery disease, no diabetes mellitus, no Key-Danlos syndrome, not male, no Marfan's syndrome, not pregnant, no prior DVT/PE, no smoking and no surgery        Review of Systems   Constitutional: Negative.  Negative for diaphoresis, fatigue and fever.   HENT: Negative.  Negative for trouble swallowing.    Eyes: Negative.    Respiratory: Negative.  Negative for cough and shortness of breath.    Cardiovascular: Positive for chest pain. Negative for palpitations, orthopnea, claudication, syncope and near-syncope.   Gastrointestinal: Negative.  Negative for abdominal pain, anorexia, nausea and vomiting.   Musculoskeletal: Negative.  Negative for back pain and neck pain.   Skin: Negative.    Neurological:  Negative.  Negative for weakness and headaches.   All other systems reviewed and are negative.      Past Medical History:   Diagnosis Date   • Atrial fibrillation (HCC)    • Bradycardia    • Cancer (HCC) 2002    Breast   • Hyperlipidemia    • Hypertension    • Osteoporosis        Allergies   Allergen Reactions   • Morphine Unknown - Low Severity     Has not taken. But family members cannot take.   • Penicillins Rash       Past Surgical History:   Procedure Laterality Date   • APPENDECTOMY     • HYSTERECTOMY     • MASTECTOMY Bilateral        Family History   Problem Relation Age of Onset   • Hypertension Father    • Heart attack Brother    • Cancer Brother        Social History     Socioeconomic History   • Marital status:    Tobacco Use   • Smoking status: Never Smoker   • Smokeless tobacco: Never Used   Substance and Sexual Activity   • Alcohol use: No   • Drug use: No   • Sexual activity: Defer           Objective   Physical Exam  Vitals and nursing note reviewed. Exam conducted with a chaperone present.   Constitutional:       General: She is not in acute distress.     Appearance: Normal appearance. She is well-developed. She is not toxic-appearing or diaphoretic.   HENT:      Head: Normocephalic and atraumatic.      Right Ear: External ear normal.      Nose: Nose normal.      Mouth/Throat:      Mouth: Mucous membranes are moist.   Eyes:      Conjunctiva/sclera: Conjunctivae normal.      Pupils: Pupils are equal, round, and reactive to light.   Cardiovascular:      Rate and Rhythm: Bradycardia present. Rhythm regularly irregular.      Chest Wall: PMI is not displaced.      Pulses: Normal pulses. No decreased pulses.      Heart sounds: Normal heart sounds. No murmur heard.   No diastolic murmur is present.      Pulmonary:      Effort: Pulmonary effort is normal. No tachypnea, accessory muscle usage or respiratory distress.      Breath sounds: Normal breath sounds. No stridor. No decreased breath sounds,  wheezing, rhonchi or rales.   Chest:      Chest wall: No tenderness or crepitus.   Abdominal:      General: Bowel sounds are normal. There is no distension.      Palpations: Abdomen is soft.      Tenderness: There is no abdominal tenderness.   Musculoskeletal:         General: No swelling or tenderness. Normal range of motion.      Cervical back: Normal range of motion and neck supple. No rigidity.      Right lower leg: No edema.      Left lower leg: No edema.      Comments: Lower extremity exam bilaterally is unremarkable.  There is no right or left calf tenderness .  There is no palpable venous cord.  No obvious difference in the size of the legs.  No pitting edema.  The dorsalis pedis and posterior tibial femoral and popliteal pulses are palpable and +2 bilaterally.  Homans sign is negative   Skin:     General: Skin is warm.      Capillary Refill: Capillary refill takes less than 2 seconds.      Coloration: Skin is not jaundiced.      Findings: No erythema or rash.   Neurological:      General: No focal deficit present.      Mental Status: She is alert and oriented to person, place, and time. Mental status is at baseline.      Cranial Nerves: No cranial nerve deficit.      Motor: No weakness.      Coordination: Coordination normal.      Deep Tendon Reflexes: Reflexes are normal and symmetric. Reflexes normal.   Psychiatric:         Mood and Affect: Mood normal.         Procedures           ED Course  ED Course as of 11/07/21 1231   Sun Nov 07, 2021   0757 EKG shows slow A. fib [TS]   0757 Patient is not a danger beta-blockers [TS]   0833 EKG shows atrial fibrillation with slow ventricle response rate heart rate has been maintaining in the range of 39-40 is irregular but she maintains her blood pressure with. [TS]   1127 No evidence of pulmonary thromboembolic disease. There is enlargement  of the pulmonary arteries suggesting pulmonary arterial hypertension.  2. Moderate cardiomegaly. Coronary calcifications are  present. There is  both left and right chamber enlargement present. There is elevated right  heart pressure with reflux of contrast into the intrahepatic IVC which  is dilated.  3. The thoracic aorta is ectatic but without evidence of dissection or  aneurysm. There is mild calcific plaquing at the origin of the great  vessels without evidence of associated stenosis within the innominate,  left common carotid and left subclavian artery. The left vertebral  artery emanates directly from the aortic arch and there is more  significant stenosis at its origin with at least moderate  cross-sectional narrowing. The left vertebral artery does otherwise  demonstrate normal enhancement proximally.  4. Bilateral lower lobe and left lingular atelectasis. No evidence of  consolidative pneumonia or effusion.  This report was finalized on 11/07/2021 11:06 by Dr. Joseph Santos MD. Case discussed and the CT findings are discussed with patient [TS]   1229 Case discussed with Dr. Drew  Patient has persistent episodes of bradycardia probably require pacemaker admitted to the hospital service [TS]      ED Course User Index  [TS] Javier Oneill MD                                           MDM  Number of Diagnoses or Management Options  Diagnosis management comments: Differential Diagnosis:  I considered chest wall pain, muscle strain, costochondritis, pleurisy, rib fracture, herpes zoster, cardiovascular etiology, myocardial infarction, intermediate coronary syndrome, unstable angina, angina, aortic dissection, pericarditis, pulmonary etiology, pulmonary embolism, pneumonia, pneumothorax, lung cancer, gastroesophageal reflux disease, esophagitis, esophageal spasm and gastrointestinal etiology as a possible cause of chest pain in this patient. This is a partial list of diagnoses considered.         Amount and/or Complexity of Data Reviewed  Clinical lab tests: reviewed and ordered  Tests in the radiology section of CPT®: ordered  and reviewed  Tests in the medicine section of CPT®: ordered and reviewed    Risk of Complications, Morbidity, and/or Mortality  Presenting problems: moderate  Diagnostic procedures: moderate  Management options: moderate        Final diagnoses:   Bradycardia   Chest pain, unspecified type       ED Disposition  ED Disposition     ED Disposition Condition Comment    Decision to Admit  Level of Care: Telemetry [5]   Diagnosis: Bradycardia [162856]   Admitting Physician: RENITA BRADSHAW [[id number]]   Attending Physician: RENITA BRADSHAW [[id number]]   Certification: I Certify That Inpatient Hospital Services Are Medically Necessary For Greater Than 2 Midnights            No follow-up provider specified.       Medication List      No changes were made to your prescriptions during this visit.          Javier Oneill MD  11/07/21 1230

## 2021-11-07 NOTE — H&P
[facility]  HISTORY AND PHYSICAL    Date of Admission: 11/7/2021  Primary Care Physician: Elia Preston MD    Subjective     Chief Complaint: chest discomfort    History of Present Illness  Patient is an 89-year-old female with a history of chronic A. fib on Coumadin, hypertension, hyperlipidemia, breast cancer status post mastectomies.  She presents to the hospital for chest discomfort.  Patient tells me she had not been feeling well since Thursday 11/4 with intermittent discomfort in her epigastric/sternal area but that the pain would go away not really bother her.  She did not seek immediate evaluation because she did not want to bother her son at the time.  Due to ongoing issues she came in for evaluation today.  In no distress on arrival.  Afebrile.  Blood pressure normal.  Respiratory rate and oxygen levels normal on room air.  However she was found to be profoundly bradycardic with heart rates down into the 30s at times.  Cardiology was called by ER provider who asked her medicine team to admit for further care and patient would likely need a pacemaker.  Again patient seen in the ER 23.  She otherwise feels okay at this time.  She is not having any pain when I evaluated her.  Denies any recent fevers or chills.  No cough or shortness of breath.  No nausea, vomiting, diarrhea.  No focal numbness, tingling, or weakness.        Review of Systems   Otherwise complete ROS reviewed and negative except as mentioned in the HPI.    Past Medical History:   Past Medical History:   Diagnosis Date   • Atrial fibrillation (HCC)    • Bradycardia    • Cancer (HCC) 2002    Breast   • Hyperlipidemia    • Hypertension    • Osteoporosis      Past Surgical History:  Past Surgical History:   Procedure Laterality Date   • APPENDECTOMY     • HYSTERECTOMY     • MASTECTOMY Bilateral      Social History:  reports that she has never smoked. She has never used smokeless  tobacco. She reports that she does not drink alcohol and does not use drugs.    Family History: family history includes Cancer in her brother; Heart attack in her brother; Hypertension in her father.      Allergies:  Allergies   Allergen Reactions   • Morphine Unknown - Low Severity     Has not taken. But family members cannot take.   • Penicillins Rash       Medications:  Prior to Admission medications    Medication Sig Start Date End Date Taking? Authorizing Provider   alendronate (Fosamax) 70 MG tablet Take 1 tablet by mouth 1 (One) Time Per Week. 8/5/20  Yes Kenroy Luna APRN   budesonide (RINOCORT AQUA) 32 MCG/ACT nasal spray INSTILL 1 TO 2 SPRAY(S) IN EACH NOSTRIL ONCE DAILY 9/13/20  Yes Daniel Mcneil MD   Calcium Carb-Cholecalciferol (CALCIUM 600+D3) 600-200 MG-UNIT tablet Take 1 tablet by mouth Daily.   Yes Daniel Mcneil MD   folic acid (FOLVITE) 1 MG tablet Take 1 mg by mouth 3 (Three) Times a Week.   Yes Daniel Mcneil MD   lisinopril-hydrochlorothiazide (PRINZIDE,ZESTORETIC) 10-12.5 MG per tablet Take 1 tablet by mouth Daily. 11/18/19  Yes Daniel Mcneil MD   Magnesium 250 MG tablet Take 1 tablet by mouth Daily.   Yes Daniel Mcneil MD   Potassium 99 MG tablet Take 1 tablet by mouth Daily.   Yes Daniel Mcneil MD   simvastatin (ZOCOR) 20 MG tablet Take 20 mg by mouth Every Night. 10/29/19  Yes Daniel Mcneil MD   vitamin B-12 (CYANOCOBALAMIN) 1000 MCG tablet Take 1 tablet by mouth Daily.   Yes Daniel Mcneil MD   Vitamin E 100 units tablet Take 1 tablet by mouth Daily.   Yes Daniel Mcneil MD   warfarin (COUMADIN) 5 MG tablet Takes 7.5 mg daily except Saturday takes 5 mg 8/23/20  Yes Daniel Mcneil MD   acetaminophen-codeine (TYLENOL #3) 300-30 MG per tablet Take 1 tablet by mouth Every 4 (Four) Hours As Needed for Moderate Pain . 7/24/20   Celestine Reno DO   apixaban (ELIQUIS) 2.5 MG tablet tablet Take 2.5 mg by mouth 2 (Two)  "Times a Day.    ProviderDaniel MD   BIOTIN 5000 PO Take 1 tablet by mouth 1 (One) Time Per Week.    ProviderDaniel MD   HYDROcodone-acetaminophen (NORCO) 5-325 MG per tablet Take 1 tablet by mouth 2 (two) times a day. 8/19/20   Kenroy Luna APRN   Multiple Vitamins-Minerals (CENTRUM SILVER ADULT 50+ PO) Take 1 tablet by mouth Daily.    ProviderDaniel MD   ondansetron ODT (ZOFRAN-ODT) 4 MG disintegrating tablet Place 1 tablet on the tongue Every 8 (Eight) Hours As Needed for Nausea. 7/24/20   Celestine Reno DO     I have utilized all available immediate resources to obtain, update, and review the patient's current medications.    Objective     Vital Signs: /45   Pulse (!) 49   Temp 97.7 °F (36.5 °C) (Oral)   Resp 15   Ht 167.6 cm (66\")   Wt 62.6 kg (138 lb)   SpO2 92%   BMI 22.27 kg/m²   Physical Exam   GEN: Awake, alert, interactive, in NAD  HEENT: PERRLA, EOMI, Anicteric, Trachea midline  Lungs: diminished but no wheezing/rales/rhonchi  Heart: rate slow, rhythm irregular, +S1/s2, no rub  ABD: soft, nt/nd, +BS, no guarding/rebound  Extremities: atraumatic, no cyanosis, some varicose veins  Skin: no rashes or petechiae   Neuro: AAOx3, no focal deficits  Psych: normal mood & affect        Results Reviewed:  Lab Results (last 24 hours)     Procedure Component Value Units Date/Time    COVID PRE-OP / PRE-PROCEDURE SCREENING ORDER (NO ISOLATION) - Swab, Nasal Cavity [[id number]]  (Normal) Collected: 11/07/21 1326    Specimen: Swab from Nasal Cavity Updated: 11/07/21 1413    Narrative:      The following orders were created for panel order COVID PRE-OP / PRE-PROCEDURE SCREENING ORDER (NO ISOLATION) - Swab, Nasal Cavity.  Procedure                               Abnormality         Status                     ---------                               -----------         ------                     COVID-19,[facility] IN-NANDO...[[id number]]  Normal              Final result                 Please " view results for these tests on the individual orders.    COVID-19,[facility] IN-HOUSE,Nasal Swab No Transport Media 3-4 HR TAT - Swab, Nasal Cavity [[id number]]  (Normal) Collected: 11/07/21 1326    Specimen: Swab from Nasal Cavity Updated: 11/07/21 1413     COVID19 Not Detected    Narrative:      Fact sheet for providers: https://www.fda.gov/media/042334/download     Fact sheet for patients: https://www.fda.gov/media/203259/download    Test performed by PCR.    Consider negative results in combination with clinical observations, patient history, and epidemiological information.  Fact sheet for providers: https://www.fda.gov/media/722856/download     Fact sheet for patients: https://www.fda.gov/media/192360/download    Test performed by PCR.    Consider negative results in combination with clinical observations, patient history, and epidemiological information.    Troponin [[id number]]  (Normal) Collected: 11/07/21 1115    Specimen: Blood Updated: 11/07/21 1140     Troponin T <0.010 ng/mL     Narrative:      Troponin T Reference Range:  <= 0.03 ng/mL-   Negative for AMI  >0.03 ng/mL-     Abnormal for myocardial necrosis.  Clinicians would have to utilize clinical acumen, EKG, Troponin and serial changes to determine if it is an Acute Myocardial Infarction or myocardial injury due to an underlying chronic condition.       Results may be falsely decreased if patient taking Biotin.      [facility] Draw [[id number]] Collected: 11/07/21 0832    Specimen: Blood from Arm, Right Updated: 11/07/21 0945    Narrative:      The following orders were created for panel order [facility] Draw.  Procedure                               Abnormality         Status                     ---------                               -----------         ------                     Green Top (Gel)[[id number]]                                  Final result               Lavender Top[[id number]]                                     Final result               Red  Top[[id number]]                                          Final result               Light Blue Top[[id number]]                                   Final result                 Please view results for these tests on the individual orders.    Green Top (Gel) [[id number]] Collected: 11/07/21 0832    Specimen: Blood from Arm, Right Updated: 11/07/21 0945     Extra Tube Hold for add-ons.     Comment: Auto resulted.       Light Blue Top [[id number]] Collected: 11/07/21 0832    Specimen: Blood from Arm, Right Updated: 11/07/21 0945     Extra Tube hold for add-on     Comment: Auto resulted       Lavender Top [[id number]] Collected: 11/07/21 0832    Specimen: Blood from Arm, Right Updated: 11/07/21 0945     Extra Tube hold for add-on     Comment: Auto resulted       Red Top [[id number]] Collected: 11/07/21 0832    Specimen: Blood from Arm, Right Updated: 11/07/21 0945     Extra Tube Hold for add-ons.     Comment: Auto resulted.       Comprehensive Metabolic Panel [[id number]]  (Abnormal) Collected: 11/07/21 0832    Specimen: Blood from Arm, Right Updated: 11/07/21 0927     Glucose 106 mg/dL      BUN 16 mg/dL      Creatinine 0.59 mg/dL      Sodium 139 mmol/L      Potassium 4.1 mmol/L      Chloride 102 mmol/L      CO2 28.0 mmol/L      Calcium 10.0 mg/dL      Total Protein 7.0 g/dL      Albumin 4.60 g/dL      ALT (SGPT) 15 U/L      AST (SGOT) 21 U/L      Alkaline Phosphatase 57 U/L      Total Bilirubin 1.2 mg/dL      eGFR Non African Amer 96 mL/min/1.73      Globulin 2.4 gm/dL      A/G Ratio 1.9 g/dL      BUN/Creatinine Ratio 27.1     Anion Gap 9.0 mmol/L     Narrative:      GFR Normal >60  Chronic Kidney Disease <60  Kidney Failure <15      Troponin [[id number]]  (Normal) Collected: 11/07/21 0832    Specimen: Blood from Arm, Right Updated: 11/07/21 0925     Troponin T <0.010 ng/mL     Narrative:      Troponin T Reference Range:  <= 0.03 ng/mL-   Negative for AMI  >0.03 ng/mL-     Abnormal for myocardial necrosis.  Clinicians would have  to utilize clinical acumen, EKG, Troponin and serial changes to determine if it is an Acute Myocardial Infarction or myocardial injury due to an underlying chronic condition.       Results may be falsely decreased if patient taking Biotin.      BNP [[id number]]  (Normal) Collected: 11/07/21 0832    Specimen: Blood from Arm, Right Updated: 11/07/21 0923     proBNP 1,182.0 pg/mL     Narrative:      Among patients with dyspnea, NT-proBNP is highly sensitive for the detection of acute congestive heart failure. In addition NT-proBNP of <300 pg/ml effectively rules out acute congestive heart failure with 99% negative predictive value.    Results may be falsely decreased if patient taking Biotin.      D-dimer, Quantitative [[id number]]  (Normal) Collected: 11/07/21 0832    Specimen: Blood from Arm, Right Updated: 11/07/21 0923     D-Dimer, Quantitative <0.22 mg/L (FEU)     Narrative:      Reference Range is 0-0.50 mg/L FEU. However, results <0.50 mg/L FEU tends to rule out DVT or PE. Results >0.50 mg/L FEU are not useful in predicting absence or presence of DVT or PE.      CBC & Differential [[id number]]  (Normal) Collected: 11/07/21 0832    Specimen: Blood from Arm, Right Updated: 11/07/21 0906    Narrative:      The following orders were created for panel order CBC & Differential.  Procedure                               Abnormality         Status                     ---------                               -----------         ------                     CBC Auto Differential[[id number]]        Normal              Final result                 Please view results for these tests on the individual orders.    CBC Auto Differential [[id number]]  (Normal) Collected: 11/07/21 0832    Specimen: Blood from Arm, Right Updated: 11/07/21 0906     WBC 5.97 10*3/mm3      RBC 4.30 10*6/mm3      Hemoglobin 13.5 g/dL      Hematocrit 40.1 %      MCV 93.3 fL      MCH 31.4 pg      MCHC 33.7 g/dL      RDW 13.0 %      RDW-SD 44.4 fl      MPV 10.7  fL      Platelets 198 10*3/mm3      Neutrophil % 59.7 %      Lymphocyte % 27.1 %      Monocyte % 10.2 %      Eosinophil % 1.5 %      Basophil % 1.0 %      Immature Grans % 0.5 %      Neutrophils, Absolute 3.56 10*3/mm3      Lymphocytes, Absolute 1.62 10*3/mm3      Monocytes, Absolute 0.61 10*3/mm3      Eosinophils, Absolute 0.09 10*3/mm3      Basophils, Absolute 0.06 10*3/mm3      Immature Grans, Absolute 0.03 10*3/mm3      nRBC 0.0 /100 WBC         Imaging Results (Last 24 Hours)     Procedure Component Value Units Date/Time    CT Angiogram Chest [[id number]] Collected: 11/07/21 1056     Updated: 11/07/21 1109    Narrative:      Exam: CT angiogram of the of the chest with intravenous contrast.     CLINICAL HISTORY:  Aortic disease. Nontraumatic.     DOSE:  79 mGycm. Automated exposure control was utilized to diminish  patient radiation dose.     TECHNIQUE: Contiguous axial images were obtained through the thorax  following intravenous contrast administration with reformatted images  obtained in the sagittal and coronal projections from the original data  set. Three-dimensional reconstructions are also obtained.     COMPARISON:  None available     FINDINGS:     Pulmonary arteries:  There is normal enhancement of the pulmonary  arteries with no evidence of pulmonary thromboembolic disease. There is  enlargement of the pulmonary arteries suggesting pulmonary arterial  hypertension.     Aorta:  The thoracic aorta is ectatic without evidence of aneurysm or  dissection. There is atheromatous calcification of the aortic arch and  descending thoracic aorta. The proximal great vessels are remarkable for  mild calcific plaquing at the origin of the innominate, left subclavian  and left common carotid artery without rate limiting stenosis. The left  vertebral artery emanates directly from the aortic arch. There is  significant disease at the origin of the left vertebral artery with at  least moderate stenosis.     LUNGS:   There is scarring within the lung apices. Bibasilar atelectasis  as well as left lingular atelectasis is present. There is no evidence of  consolidative pneumonia or effusion. No evidence of nodularity or mass.     Pleural spaces: Unremarkable. No evidence of pleural effusion or  pneumothorax.     HEART: There is moderate cardiomegaly. There is elevated right heart  pressure with enlargement of the right atrium and right ventricle and  reflux of contrast into the intrahepatic IVC which is dilated. There is  no evidence of pericardial effusion. Mild coronary calcifications are  present.     Bones: No acute osseous abnormalities are demonstrated. There is a  moderate wedge compression deformity involving the superior endplate of  L1 of uncertain acuity this was documented on plain film radiographs  dated back to the summer 2020.     CHEST WALL: No chest wall abnormalities are demonstrated.     Lymph nodes: No enlarged mediastinal or axillary lymphadenopathy is  present.     Upper abdomen: There is a moderate to large size hiatal hernia. The  adrenals are unremarkable.       Impression:      1. No evidence of pulmonary thromboembolic disease. There is enlargement  of the pulmonary arteries suggesting pulmonary arterial hypertension.  2. Moderate cardiomegaly. Coronary calcifications are present. There is  both left and right chamber enlargement present. There is elevated right  heart pressure with reflux of contrast into the intrahepatic IVC which  is dilated.  3. The thoracic aorta is ectatic but without evidence of dissection or  aneurysm. There is mild calcific plaquing at the origin of the great  vessels without evidence of associated stenosis within the innominate,  left common carotid and left subclavian artery. The left vertebral  artery emanates directly from the aortic arch and there is more  significant stenosis at its origin with at least moderate  cross-sectional narrowing. The left vertebral artery does  otherwise  demonstrate normal enhancement proximally.  4. Bilateral lower lobe and left lingular atelectasis. No evidence of  consolidative pneumonia or effusion.  This report was finalized on 11/07/2021 11:06 by Dr. Joseph Santos MD.    XR Chest 1 View [[id number]] Collected: 11/07/21 0817     Updated: 11/07/21 0821    Narrative:      EXAMINATION: Chest 1 view 11/7/2021     HISTORY: Chest pain     FINDINGS: Today's exam is compared to previous study of 7/24/2020. There  is mild cardiomegaly. Lungs are mildly hyperinflated with increased  interstitial markings which are chronic in nature. There is no evidence  of acute consolidative pneumonia or effusion. There is no free air  beneath the hemidiaphragms.       Impression:      1.. Chronic changes of the lungs. There is mild cardiomegaly.  2. No evidence of lobar pneumonia or effusion.  This report was finalized on 11/07/2021 08:18 by Dr. Joseph Santos MD.        I have personally reviewed and interpreted the radiology studies and ECG obtained at time of admission.     Assessment / Plan     Assessment:   Active Hospital Problems    Diagnosis    • **Bradycardia    • Hyperlipidemia    • Atrial fibrillation (HCC)    • Hypertension      Plan:   #1 symptomatic bradycardia -patient presented for some chest discomfort/pain ongoing for the last 4 days.  Initial EKG shows slow A. fib.  Her troponin is normal x2.  CTA negative for PE.  Patient is not on any mick blockers to slow down her heart rate.  Cardiology was called and asked to admit for further monitoring as patient would likely need a pacemaker.  Blood pressure currently stable but given rates dipping into the 30s will admit her to the ICU for close monitoring overnight.  N.p.o. at midnight.    #2 chronic A. Fib -again on no mick blockers.  Rates currently low.  Patient is on Coumadin.  Check a PT/INR.  We will plan to hold tonight with potential plans for or tomorrow.  If levels below therapeutic may need  "Lovenox in the interim pending cardiology plans.    #3 essential hypertension -blood pressure currently in the 140s.  We will plan to resume lisinopril tomorrow morning if no hypotensive episodes overnight.    #4 hyperlipidemia -continue statin    #5 chest discomfort/pain -cardiology to see patient.  EKG without any obvious acute ST changes.  Troponins negative x2.  CTA negative for PE or dissection.    Code Status/Advanced Care Plan: Full code -patient tells me she has a living will that she will have her son bring in.  Right now at this moment she is a little bit ambivalent about her CODE STATUS when asked at first. However when I asked her point blank if her heart were to stop while we were talking if she would want me to try and restart it she said, \"yes.\"    The patient's surrogate decision maker is her son.     I discussed my findings and recommendations with the patient directly at bedside.    Estimated length of stay is 2+ days.     The patient was seen and examined by me on 11/7/2021 at 1:45 PM.    Electronically signed by Andrea Blood DO, 11/07/21, 14:37 CST.              "

## 2021-11-07 NOTE — CONSULTS
Referring Provider: No Known Provider    Reason for Consultation: Symptomatic bradycardia with chest pain    Chief complaint:   Chief Complaint   Patient presents with   • Chest Pain       Subjective .     History of present illness:  Yudi Westbrook is a 89 y.o. yo female with history of atrial fibrillation, hypertension, hyperlipidemia who presented to the emergency department today with the chief complaint of chest pain.  She says that for the past few days she has been having intermittent sharp chest pains in her center chest area.  She denies any significant dizziness, lightheadedness or near syncope.  In the emergency department, she was found to be significantly bradycardic in the 30s.  EKG showed atrial fibrillation with a slow ventricular rate.  Patient is on Coumadin at home and her INR is elevated at 2.18.  CT of the chest showed enlargement of the pulmonary arteries suggesting pulmonary artery hypertension.  It also showed elevated right heart pressures.    Patient is being admitted to medicine with a consult to cardiology.        History  Past Medical History:   Diagnosis Date   • Atrial fibrillation (HCC)    • Bradycardia    • Cancer (HCC) 2002    Breast   • Hyperlipidemia    • Hypertension    • Osteoporosis    ,   Past Surgical History:   Procedure Laterality Date   • APPENDECTOMY     • HYSTERECTOMY     • MASTECTOMY Bilateral    ,   Family History   Problem Relation Age of Onset   • Hypertension Father    • Heart attack Brother    • Cancer Brother    ,   Social History     Tobacco Use   • Smoking status: Never Smoker   • Smokeless tobacco: Never Used   Substance Use Topics   • Alcohol use: No   • Drug use: No   ,     Medications  Current Facility-Administered Medications   Medication Dose Route Frequency Provider Last Rate Last Admin   • acetaminophen (TYLENOL) tablet 650 mg  650 mg Oral Q4H PRN Andrea Blood, DO        Or   • acetaminophen (TYLENOL) suppository 650 mg  650 mg Rectal Q4H PRN  Andrea Blood DO       • atorvastatin (LIPITOR) tablet 10 mg  10 mg Oral Nightly Andrea Blood DO       • [START ON 11/8/2021] folic acid (FOLVITE) tablet 1 mg  1 mg Oral Once per day on Mon Wed Fri Andrea Blood DO       • [START ON 11/8/2021] lisinopril (PRINIVIL,ZESTRIL) tablet 10 mg  10 mg Oral Q24H Andrea Blood DO       • ondansetron (ZOFRAN) injection 4 mg  4 mg Intravenous Q6H PRN Andrea Blood DO       • sodium chloride 0.9 % flush 10 mL  10 mL Intravenous PRN Javier Oneill MD       • sodium chloride 0.9 % flush 10 mL  10 mL Intravenous Q12H Andrea Blood DO       • sodium chloride 0.9 % flush 10 mL  10 mL Intravenous PRN Andrea Blood DO       • [START ON 11/8/2021] vitamin B-12 (CYANOCOBALAMIN) tablet 1,000 mcg  1,000 mcg Oral Daily Andrea Blood DO         Current Outpatient Medications   Medication Sig Dispense Refill   • alendronate (Fosamax) 70 MG tablet Take 1 tablet by mouth 1 (One) Time Per Week. 4 tablet 5   • budesonide (RINOCORT AQUA) 32 MCG/ACT nasal spray INSTILL 1 TO 2 SPRAY(S) IN EACH NOSTRIL ONCE DAILY     • Calcium Carb-Cholecalciferol (CALCIUM 600+D3) 600-200 MG-UNIT tablet Take 1 tablet by mouth Daily.     • folic acid (FOLVITE) 1 MG tablet Take 1 mg by mouth 3 (Three) Times a Week.     • lisinopril-hydrochlorothiazide (PRINZIDE,ZESTORETIC) 10-12.5 MG per tablet Take 1 tablet by mouth Daily.     • Magnesium 250 MG tablet Take 1 tablet by mouth Daily.     • Potassium 99 MG tablet Take 1 tablet by mouth Daily.     • simvastatin (ZOCOR) 20 MG tablet Take 20 mg by mouth Every Night.     • vitamin B-12 (CYANOCOBALAMIN) 1000 MCG tablet Take 1 tablet by mouth Daily.     • Vitamin E 100 units tablet Take 1 tablet by mouth Daily.     • warfarin (COUMADIN) 5 MG tablet Takes 7.5 mg daily except Saturday takes 5 mg     • acetaminophen-codeine (TYLENOL #3) 300-30 MG per tablet Take 1 tablet by mouth Every 4 (Four) Hours As Needed for Moderate Pain . 16 tablet 0   •  apixaban (ELIQUIS) 2.5 MG tablet tablet Take 2.5 mg by mouth 2 (Two) Times a Day.     • BIOTIN 5000 PO Take 1 tablet by mouth 1 (One) Time Per Week.     • HYDROcodone-acetaminophen (NORCO) 5-325 MG per tablet Take 1 tablet by mouth 2 (two) times a day. 30 tablet 0   • Multiple Vitamins-Minerals (CENTRUM SILVER ADULT 50+ PO) Take 1 tablet by mouth Daily.     • ondansetron ODT (ZOFRAN-ODT) 4 MG disintegrating tablet Place 1 tablet on the tongue Every 8 (Eight) Hours As Needed for Nausea. 12 tablet 0       Allergies:  Morphine and Penicillins    Review of Systems  Review of Systems   Constitutional: Negative for diaphoresis, fever and malaise/fatigue.   HENT: Negative for congestion.    Eyes: Negative for vision loss in left eye and vision loss in right eye.   Cardiovascular: Positive for chest pain. Negative for claudication, dyspnea on exertion, irregular heartbeat, leg swelling, orthopnea, palpitations and syncope.   Respiratory: Negative for cough, shortness of breath and wheezing.    Hematologic/Lymphatic: Negative for adenopathy.   Skin: Negative for rash.   Musculoskeletal: Negative for joint pain and joint swelling.   Gastrointestinal: Negative for abdominal pain, diarrhea, nausea and vomiting.   Neurological: Negative for excessive daytime sleepiness, dizziness, focal weakness, light-headedness, numbness and weakness.   Psychiatric/Behavioral: Negative for depression. The patient does not have insomnia.        Objective     Physical Exam:  Patient Vitals for the past 24 hrs:   BP Temp Temp src Pulse Resp SpO2 Height Weight   11/07/21 1317 -- -- -- (!) 49 15 92 % -- --   11/07/21 1316 152/45 -- -- -- -- -- -- --   11/07/21 1312 -- -- -- (!) 47 -- 94 % -- --   11/07/21 1301 141/57 -- -- (!) 46 -- 94 % -- --   11/07/21 1248 158/49 -- -- 51 -- 92 % -- --   11/07/21 1231 165/59 -- -- -- -- -- -- --   11/07/21 1230 -- -- -- 52 -- 94 % -- --   11/07/21 1216 145/54 -- -- -- -- -- -- --   11/07/21 1215 -- -- -- 50 --  "93 % -- --   11/07/21 1201 147/65 -- -- (!) 38 -- 93 % -- --   11/07/21 1146 127/60 -- -- 50 13 94 % -- --   11/07/21 1132 -- -- -- (!) 45 -- 92 % -- --   11/07/21 1131 148/55 -- -- -- -- -- -- --   11/07/21 1116 117/45 -- -- (!) 47 -- 95 % -- --   11/07/21 1101 138/49 -- -- (!) 44 -- 95 % -- --   11/07/21 1046 145/49 -- -- 55 -- -- -- --   11/07/21 1045 -- -- -- (!) 49 -- 95 % -- --   11/07/21 1016 149/56 -- -- (!) 48 -- 92 % -- --   11/07/21 1002 -- -- -- (!) 48 -- 92 % -- --   11/07/21 1001 142/47 -- -- -- -- -- -- --   11/07/21 0946 155/62 -- -- (!) 40 -- 93 % -- --   11/07/21 0935 -- -- -- (!) 41 16 94 % -- --   11/07/21 0931 158/61 -- -- 53 -- 93 % -- --   11/07/21 0916 149/65 -- -- 52 -- 94 % -- --   11/07/21 0901 (!) 156/105 -- -- -- -- -- -- --   11/07/21 0900 -- -- -- (!) 49 -- 97 % -- --   11/07/21 0848 -- -- -- 50 -- 96 % -- --   11/07/21 0846 164/51 -- -- (!) 47 -- -- -- --   11/07/21 0843 -- -- -- 54 -- 97 % -- --   11/07/21 0837 -- -- -- (!) 41 -- 95 % -- --   11/07/21 0831 161/61 -- -- 54 -- 96 % -- --   11/07/21 0816 169/58 -- -- (!) 47 -- 94 % -- --   11/07/21 0804 -- -- -- (!) 38 -- 96 % -- --   11/07/21 0802 -- -- -- 54 -- 97 % -- --   11/07/21 0801 160/55 -- -- (!) 48 -- -- -- --   11/07/21 0736 (!) 185/53 97.7 °F (36.5 °C) Oral (!) 45 18 98 % 167.6 cm (66\") 62.6 kg (138 lb)     Vitals and nursing note reviewed.   Constitutional:       Appearance: Normal and healthy appearance. Well-developed and not in distress.   Eyes:      Extraocular Movements: Extraocular movements intact.      Pupils: Pupils are equal, round, and reactive to light.   HENT:      Head: Normocephalic and atraumatic.    Mouth/Throat:      Pharynx: Oropharynx is clear.   Neck:      Vascular: JVD normal.      Trachea: Trachea normal.   Pulmonary:      Effort: Pulmonary effort is normal.      Breath sounds: Normal breath sounds. No wheezing. No rhonchi. No rales.   Cardiovascular:      PMI at left midclavicular line. Bradycardia " present. Irregular rhythm. Normal S1. Normal S2.      Murmurs: There is a grade 2/6 systolic murmur.      No gallop. No click. No rub.   Pulses:     Dorsalis pedis: 2+ bilaterally.     Posterior tibial: 2+ bilaterally.  Abdominal:      General: Bowel sounds are normal.      Palpations: Abdomen is soft.      Tenderness: There is no abdominal tenderness.   Musculoskeletal: Normal range of motion.      Cervical back: Normal range of motion and neck supple. Skin:     General: Skin is warm and dry.      Capillary Refill: Capillary refill takes less than 2 seconds.   Feet:      Right foot:      Skin integrity: Skin integrity normal.      Left foot:      Skin integrity: Skin integrity normal.   Neurological:      Mental Status: Alert and oriented to person, place and time.      Cranial Nerves: Cranial nerves are intact.      Sensory: Sensation is intact.      Motor: Motor function is intact.      Coordination: Coordination is intact.   Psychiatric:         Speech: Speech normal.         Behavior: Behavior is cooperative.         Results Review:   I reviewed the patient's new clinical results.  Lab Results (most recent)     Procedure Component Value Units Date/Time    Protime-INR [[id number]]  (Abnormal) Collected: 11/07/21 0832    Specimen: Blood from Arm, Right Updated: 11/07/21 1514     Protime 23.4 Seconds      INR 2.18    COVID PRE-OP / PRE-PROCEDURE SCREENING ORDER (NO ISOLATION) - Swab, Nasal Cavity [[id number]]  (Normal) Collected: 11/07/21 1326    Specimen: Swab from Nasal Cavity Updated: 11/07/21 1413    Narrative:      The following orders were created for panel order COVID PRE-OP / PRE-PROCEDURE SCREENING ORDER (NO ISOLATION) - Swab, Nasal Cavity.  Procedure                               Abnormality         Status                     ---------                               -----------         ------                     COVID-19,[facility] IN-NANDO...[[id number]]  Normal              Final result                 Please  view results for these tests on the individual orders.    COVID-19,[facility] IN-HOUSE,Nasal Swab No Transport Media 3-4 HR TAT - Swab, Nasal Cavity [[id number]]  (Normal) Collected: 11/07/21 1326    Specimen: Swab from Nasal Cavity Updated: 11/07/21 1413     COVID19 Not Detected    Narrative:      Fact sheet for providers: https://www.fda.gov/media/124182/download     Fact sheet for patients: https://www.fda.gov/media/693522/download    Test performed by PCR.    Consider negative results in combination with clinical observations, patient history, and epidemiological information.  Fact sheet for providers: https://www.fda.gov/media/345203/download     Fact sheet for patients: https://www.fda.gov/media/946288/download    Test performed by PCR.    Consider negative results in combination with clinical observations, patient history, and epidemiological information.    Troponin [[id number]]  (Normal) Collected: 11/07/21 1115    Specimen: Blood Updated: 11/07/21 1140     Troponin T <0.010 ng/mL     Narrative:      Troponin T Reference Range:  <= 0.03 ng/mL-   Negative for AMI  >0.03 ng/mL-     Abnormal for myocardial necrosis.  Clinicians would have to utilize clinical acumen, EKG, Troponin and serial changes to determine if it is an Acute Myocardial Infarction or myocardial injury due to an underlying chronic condition.       Results may be falsely decreased if patient taking Biotin.      [place] Draw [[id number]] Collected: 11/07/21 0832    Specimen: Blood from Arm, Right Updated: 11/07/21 0945    Narrative:      The following orders were created for panel order [place] Draw.  Procedure                               Abnormality         Status                     ---------                               -----------         ------                     Green Top (Gel)[[id number]]                                  Final result               Lavender Top[[id number]]                                     Final result               Red  Top[[id number]]                                          Final result               Light Blue Top[[id number]]                                   Final result                 Please view results for these tests on the individual orders.    Green Top (Gel) [[id number]] Collected: 11/07/21 0832    Specimen: Blood from Arm, Right Updated: 11/07/21 0945     Extra Tube Hold for add-ons.     Comment: Auto resulted.       Light Blue Top [[id number]] Collected: 11/07/21 0832    Specimen: Blood from Arm, Right Updated: 11/07/21 0945     Extra Tube hold for add-on     Comment: Auto resulted       Lavender Top [[id number]] Collected: 11/07/21 0832    Specimen: Blood from Arm, Right Updated: 11/07/21 0945     Extra Tube hold for add-on     Comment: Auto resulted       Red Top [[id number]] Collected: 11/07/21 0832    Specimen: Blood from Arm, Right Updated: 11/07/21 0945     Extra Tube Hold for add-ons.     Comment: Auto resulted.       Comprehensive Metabolic Panel [[id number]]  (Abnormal) Collected: 11/07/21 0832    Specimen: Blood from Arm, Right Updated: 11/07/21 0927     Glucose 106 mg/dL      BUN 16 mg/dL      Creatinine 0.59 mg/dL      Sodium 139 mmol/L      Potassium 4.1 mmol/L      Chloride 102 mmol/L      CO2 28.0 mmol/L      Calcium 10.0 mg/dL      Total Protein 7.0 g/dL      Albumin 4.60 g/dL      ALT (SGPT) 15 U/L      AST (SGOT) 21 U/L      Alkaline Phosphatase 57 U/L      Total Bilirubin 1.2 mg/dL      eGFR Non African Amer 96 mL/min/1.73      Globulin 2.4 gm/dL      A/G Ratio 1.9 g/dL      BUN/Creatinine Ratio 27.1     Anion Gap 9.0 mmol/L     Narrative:      GFR Normal >60  Chronic Kidney Disease <60  Kidney Failure <15      Troponin [[id number]]  (Normal) Collected: 11/07/21 0832    Specimen: Blood from Arm, Right Updated: 11/07/21 0925     Troponin T <0.010 ng/mL     Narrative:      Troponin T Reference Range:  <= 0.03 ng/mL-   Negative for AMI  >0.03 ng/mL-     Abnormal for myocardial necrosis.  Clinicians would have  to utilize clinical acumen, EKG, Troponin and serial changes to determine if it is an Acute Myocardial Infarction or myocardial injury due to an underlying chronic condition.       Results may be falsely decreased if patient taking Biotin.      BNP [[id number]]  (Normal) Collected: 11/07/21 0832    Specimen: Blood from Arm, Right Updated: 11/07/21 0923     proBNP 1,182.0 pg/mL     Narrative:      Among patients with dyspnea, NT-proBNP is highly sensitive for the detection of acute congestive heart failure. In addition NT-proBNP of <300 pg/ml effectively rules out acute congestive heart failure with 99% negative predictive value.    Results may be falsely decreased if patient taking Biotin.      D-dimer, Quantitative [[id number]]  (Normal) Collected: 11/07/21 0832    Specimen: Blood from Arm, Right Updated: 11/07/21 0923     D-Dimer, Quantitative <0.22 mg/L (FEU)     Narrative:      Reference Range is 0-0.50 mg/L FEU. However, results <0.50 mg/L FEU tends to rule out DVT or PE. Results >0.50 mg/L FEU are not useful in predicting absence or presence of DVT or PE.      CBC & Differential [[id number]]  (Normal) Collected: 11/07/21 0832    Specimen: Blood from Arm, Right Updated: 11/07/21 0906    Narrative:      The following orders were created for panel order CBC & Differential.  Procedure                               Abnormality         Status                     ---------                               -----------         ------                     CBC Auto Differential[[id number]]        Normal              Final result                 Please view results for these tests on the individual orders.    CBC Auto Differential [[id number]]  (Normal) Collected: 11/07/21 0832    Specimen: Blood from Arm, Right Updated: 11/07/21 0906     WBC 5.97 10*3/mm3      RBC 4.30 10*6/mm3      Hemoglobin 13.5 g/dL      Hematocrit 40.1 %      MCV 93.3 fL      MCH 31.4 pg      MCHC 33.7 g/dL      RDW 13.0 %      RDW-SD 44.4 fl      MPV 10.7  fL      Platelets 198 10*3/mm3      Neutrophil % 59.7 %      Lymphocyte % 27.1 %      Monocyte % 10.2 %      Eosinophil % 1.5 %      Basophil % 1.0 %      Immature Grans % 0.5 %      Neutrophils, Absolute 3.56 10*3/mm3      Lymphocytes, Absolute 1.62 10*3/mm3      Monocytes, Absolute 0.61 10*3/mm3      Eosinophils, Absolute 0.09 10*3/mm3      Basophils, Absolute 0.06 10*3/mm3      Immature Grans, Absolute 0.03 10*3/mm3      nRBC 0.0 /100 WBC         No results found for: ECHOEFEST  Imaging Results (Most Recent)     Procedure Component Value Units Date/Time    CT Angiogram Chest [[id number]] Collected: 11/07/21 1056     Updated: 11/07/21 1109    Narrative:      Exam: CT angiogram of the of the chest with intravenous contrast.     CLINICAL HISTORY:  Aortic disease. Nontraumatic.     DOSE:  79 mGycm. Automated exposure control was utilized to diminish  patient radiation dose.     TECHNIQUE: Contiguous axial images were obtained through the thorax  following intravenous contrast administration with reformatted images  obtained in the sagittal and coronal projections from the original data  set. Three-dimensional reconstructions are also obtained.     COMPARISON:  None available     FINDINGS:     Pulmonary arteries:  There is normal enhancement of the pulmonary  arteries with no evidence of pulmonary thromboembolic disease. There is  enlargement of the pulmonary arteries suggesting pulmonary arterial  hypertension.     Aorta:  The thoracic aorta is ectatic without evidence of aneurysm or  dissection. There is atheromatous calcification of the aortic arch and  descending thoracic aorta. The proximal great vessels are remarkable for  mild calcific plaquing at the origin of the innominate, left subclavian  and left common carotid artery without rate limiting stenosis. The left  vertebral artery emanates directly from the aortic arch. There is  significant disease at the origin of the left vertebral artery with at  least  moderate stenosis.     LUNGS:  There is scarring within the lung apices. Bibasilar atelectasis  as well as left lingular atelectasis is present. There is no evidence of  consolidative pneumonia or effusion. No evidence of nodularity or mass.     Pleural spaces: Unremarkable. No evidence of pleural effusion or  pneumothorax.     HEART: There is moderate cardiomegaly. There is elevated right heart  pressure with enlargement of the right atrium and right ventricle and  reflux of contrast into the intrahepatic IVC which is dilated. There is  no evidence of pericardial effusion. Mild coronary calcifications are  present.     Bones: No acute osseous abnormalities are demonstrated. There is a  moderate wedge compression deformity involving the superior endplate of  L1 of uncertain acuity this was documented on plain film radiographs  dated back to the summer 2020.     CHEST WALL: No chest wall abnormalities are demonstrated.     Lymph nodes: No enlarged mediastinal or axillary lymphadenopathy is  present.     Upper abdomen: There is a moderate to large size hiatal hernia. The  adrenals are unremarkable.       Impression:      1. No evidence of pulmonary thromboembolic disease. There is enlargement  of the pulmonary arteries suggesting pulmonary arterial hypertension.  2. Moderate cardiomegaly. Coronary calcifications are present. There is  both left and right chamber enlargement present. There is elevated right  heart pressure with reflux of contrast into the intrahepatic IVC which  is dilated.  3. The thoracic aorta is ectatic but without evidence of dissection or  aneurysm. There is mild calcific plaquing at the origin of the great  vessels without evidence of associated stenosis within the innominate,  left common carotid and left subclavian artery. The left vertebral  artery emanates directly from the aortic arch and there is more  significant stenosis at its origin with at least moderate  cross-sectional narrowing.  The left vertebral artery does otherwise  demonstrate normal enhancement proximally.  4. Bilateral lower lobe and left lingular atelectasis. No evidence of  consolidative pneumonia or effusion.  This report was finalized on 11/07/2021 11:06 by Dr. Joseph Santos MD.    XR Chest 1 View [[id number]] Collected: 11/07/21 0817     Updated: 11/07/21 0821    Narrative:      EXAMINATION: Chest 1 view 11/7/2021     HISTORY: Chest pain     FINDINGS: Today's exam is compared to previous study of 7/24/2020. There  is mild cardiomegaly. Lungs are mildly hyperinflated with increased  interstitial markings which are chronic in nature. There is no evidence  of acute consolidative pneumonia or effusion. There is no free air  beneath the hemidiaphragms.       Impression:      1.. Chronic changes of the lungs. There is mild cardiomegaly.  2. No evidence of lobar pneumonia or effusion.  This report was finalized on 11/07/2021 08:18 by Dr. Joseph Santos MD.              Assessment/Plan     Bradycardia    Atrial fibrillation (HCC)    Hypertension    Hyperlipidemia    Plan:    89-year-old female with history of atrial fibrillation on Coumadin therapy but no rate or rhythm controlling drugs presents to the emergency department with episodes of chest pain.  She denies any classic symptoms of bradycardia including dizziness, lightheadedness or near syncope but does get the episodes of chest pain when her heart rate drops quite low.    Would classify this as symptomatic bradycardia that is nonreversible.  We will monitor the patient closely overnight in the ICU on telemetry.  Likely need for permanent pacemaker insertion tomorrow if she remains symptomatic and bradycardic.    Thank you for the consult.  Please call with any questions.  We will continue to follow along.    Juan Francisco Drew DO  Interventional cardiology  [facility]  November 7, 2021

## 2021-11-08 LAB
ALBUMIN SERPL-MCNC: 4.5 G/DL (ref 3.5–5.2)
ALBUMIN/GLOB SERPL: 1.9 G/DL
ALP SERPL-CCNC: 57 U/L (ref 39–117)
ALT SERPL W P-5'-P-CCNC: 11 U/L (ref 1–33)
ANION GAP SERPL CALCULATED.3IONS-SCNC: 8 MMOL/L (ref 5–15)
ANION GAP SERPL CALCULATED.3IONS-SCNC: 8 MMOL/L (ref 5–15)
AST SERPL-CCNC: 22 U/L (ref 1–32)
BILIRUB SERPL-MCNC: 1.2 MG/DL (ref 0–1.2)
BUN SERPL-MCNC: 15 MG/DL (ref 8–23)
BUN SERPL-MCNC: 17 MG/DL (ref 8–23)
BUN/CREAT SERPL: 23.8 (ref 7–25)
BUN/CREAT SERPL: 24.6 (ref 7–25)
CALCIUM SPEC-SCNC: 9.5 MG/DL (ref 8.6–10.5)
CALCIUM SPEC-SCNC: 9.6 MG/DL (ref 8.6–10.5)
CHLORIDE SERPL-SCNC: 103 MMOL/L (ref 98–107)
CHLORIDE SERPL-SCNC: 104 MMOL/L (ref 98–107)
CO2 SERPL-SCNC: 27 MMOL/L (ref 22–29)
CO2 SERPL-SCNC: 28 MMOL/L (ref 22–29)
CREAT SERPL-MCNC: 0.63 MG/DL (ref 0.57–1)
CREAT SERPL-MCNC: 0.69 MG/DL (ref 0.57–1)
DEPRECATED RDW RBC AUTO: 44.9 FL (ref 37–54)
ERYTHROCYTE [DISTWIDTH] IN BLOOD BY AUTOMATED COUNT: 13.1 % (ref 12.3–15.4)
GFR SERPL CREATININE-BSD FRML MDRD: 80 ML/MIN/1.73
GFR SERPL CREATININE-BSD FRML MDRD: 89 ML/MIN/1.73
GLOBULIN UR ELPH-MCNC: 2.4 GM/DL
GLUCOSE SERPL-MCNC: 111 MG/DL (ref 65–99)
GLUCOSE SERPL-MCNC: 111 MG/DL (ref 65–99)
HCT VFR BLD AUTO: 40.2 % (ref 34–46.6)
HGB BLD-MCNC: 13.2 G/DL (ref 12–15.9)
INR PPP: 2.01 (ref 0.91–1.09)
MAGNESIUM SERPL-MCNC: 2 MG/DL (ref 1.6–2.4)
MCH RBC QN AUTO: 30.9 PG (ref 26.6–33)
MCHC RBC AUTO-ENTMCNC: 32.8 G/DL (ref 31.5–35.7)
MCV RBC AUTO: 94.1 FL (ref 79–97)
PLATELET # BLD AUTO: 194 10*3/MM3 (ref 140–450)
PMV BLD AUTO: 11 FL (ref 6–12)
POTASSIUM SERPL-SCNC: 4.4 MMOL/L (ref 3.5–5.2)
POTASSIUM SERPL-SCNC: 4.4 MMOL/L (ref 3.5–5.2)
PROT SERPL-MCNC: 6.9 G/DL (ref 6–8.5)
PROTHROMBIN TIME: 22 SECONDS (ref 11.9–14.6)
QT INTERVAL: 456 MS
QT INTERVAL: 482 MS
QT INTERVAL: 492 MS
QT INTERVAL: 492 MS
QT INTERVAL: 498 MS
QT INTERVAL: 500 MS
QT INTERVAL: 500 MS
QT INTERVAL: 510 MS
QT INTERVAL: 520 MS
QT INTERVAL: 524 MS
QT INTERVAL: 524 MS
QTC INTERVAL: 403 MS
QTC INTERVAL: 407 MS
QTC INTERVAL: 425 MS
QTC INTERVAL: 430 MS
QTC INTERVAL: 439 MS
QTC INTERVAL: 442 MS
QTC INTERVAL: 444 MS
QTC INTERVAL: 446 MS
QTC INTERVAL: 460 MS
QTC INTERVAL: 491 MS
QTC INTERVAL: 492 MS
RBC # BLD AUTO: 4.27 10*6/MM3 (ref 3.77–5.28)
SODIUM SERPL-SCNC: 138 MMOL/L (ref 136–145)
SODIUM SERPL-SCNC: 140 MMOL/L (ref 136–145)
WBC # BLD AUTO: 8.29 10*3/MM3 (ref 3.4–10.8)

## 2021-11-08 PROCEDURE — 80053 COMPREHEN METABOLIC PANEL: CPT | Performed by: INTERNAL MEDICINE

## 2021-11-08 PROCEDURE — 99232 SBSQ HOSP IP/OBS MODERATE 35: CPT | Performed by: EMERGENCY MEDICINE

## 2021-11-08 PROCEDURE — 93010 ELECTROCARDIOGRAM REPORT: CPT | Performed by: INTERNAL MEDICINE

## 2021-11-08 PROCEDURE — 85027 COMPLETE CBC AUTOMATED: CPT | Performed by: FAMILY MEDICINE

## 2021-11-08 PROCEDURE — 99222 1ST HOSP IP/OBS MODERATE 55: CPT | Performed by: INTERNAL MEDICINE

## 2021-11-08 PROCEDURE — 36415 COLL VENOUS BLD VENIPUNCTURE: CPT | Performed by: INTERNAL MEDICINE

## 2021-11-08 PROCEDURE — 85610 PROTHROMBIN TIME: CPT | Performed by: INTERNAL MEDICINE

## 2021-11-08 PROCEDURE — 83735 ASSAY OF MAGNESIUM: CPT | Performed by: INTERNAL MEDICINE

## 2021-11-08 PROCEDURE — 93005 ELECTROCARDIOGRAM TRACING: CPT | Performed by: EMERGENCY MEDICINE

## 2021-11-08 RX ORDER — FLUTICASONE PROPIONATE 50 MCG
2 SPRAY, SUSPENSION (ML) NASAL DAILY PRN
COMMUNITY

## 2021-11-08 RX ORDER — FAMOTIDINE 10 MG/ML
20 INJECTION, SOLUTION INTRAVENOUS EVERY 12 HOURS SCHEDULED
Status: DISCONTINUED | OUTPATIENT
Start: 2021-11-08 | End: 2021-11-11 | Stop reason: HOSPADM

## 2021-11-08 RX ORDER — ALUMINA, MAGNESIA, AND SIMETHICONE 2400; 2400; 240 MG/30ML; MG/30ML; MG/30ML
15 SUSPENSION ORAL EVERY 6 HOURS PRN
Status: DISCONTINUED | OUTPATIENT
Start: 2021-11-08 | End: 2021-11-11 | Stop reason: HOSPADM

## 2021-11-08 RX ADMIN — FOLIC ACID 1 MG: 1 TABLET ORAL at 09:06

## 2021-11-08 RX ADMIN — SODIUM CHLORIDE, PRESERVATIVE FREE 10 ML: 5 INJECTION INTRAVENOUS at 09:07

## 2021-11-08 RX ADMIN — FAMOTIDINE 20 MG: 10 INJECTION INTRAVENOUS at 05:56

## 2021-11-08 RX ADMIN — FAMOTIDINE 20 MG: 10 INJECTION INTRAVENOUS at 20:01

## 2021-11-08 RX ADMIN — LISINOPRIL 10 MG: 10 TABLET ORAL at 09:06

## 2021-11-08 RX ADMIN — ACETAMINOPHEN 650 MG: 325 TABLET, FILM COATED ORAL at 22:01

## 2021-11-08 RX ADMIN — ACETAMINOPHEN 650 MG: 325 TABLET, FILM COATED ORAL at 18:19

## 2021-11-08 RX ADMIN — Medication 1000 MCG: at 09:06

## 2021-11-08 RX ADMIN — ATORVASTATIN CALCIUM 10 MG: 10 TABLET, FILM COATED ORAL at 20:02

## 2021-11-08 RX ADMIN — SODIUM CHLORIDE, PRESERVATIVE FREE 10 ML: 5 INJECTION INTRAVENOUS at 20:01

## 2021-11-08 RX ADMIN — ALUMINUM HYDROXIDE, MAGNESIUM HYDROXIDE, AND DIMETHICONE 15 ML: 400; 400; 40 SUSPENSION ORAL at 11:09

## 2021-11-08 RX ADMIN — ACETAMINOPHEN 650 MG: 325 TABLET, FILM COATED ORAL at 05:07

## 2021-11-08 RX ADMIN — ALUMINUM HYDROXIDE, MAGNESIUM HYDROXIDE, AND DIMETHICONE 15 ML: 400; 400; 40 SUSPENSION ORAL at 22:02

## 2021-11-08 NOTE — PROGRESS NOTES
[facility] -  Progress Note     LOS: 1 day   Patient Care Team:  Elia Preston MD as PCP - General (Family Medicine)  Elia Preston MD as Consulting Physician (Family Medicine)        Subjective     Interval History:     Patient seen and examined at bedside.  She continues to have this sharp-like pain in her mid epigastric region and not so much her chest.  She seems to tolerate when her heart rate drops into the 30s and 40s.    Review of Systems:  Review of Systems   Constitutional: Negative for diaphoresis, fever and malaise/fatigue.   HENT: Negative for congestion.    Eyes: Negative for vision loss in left eye and vision loss in right eye.   Cardiovascular: Positive for chest pain. Negative for claudication, dyspnea on exertion, irregular heartbeat, leg swelling, orthopnea, palpitations and syncope.   Respiratory: Negative for cough, shortness of breath and wheezing.    Hematologic/Lymphatic: Negative for adenopathy.   Skin: Negative for rash.   Musculoskeletal: Negative for joint pain and joint swelling.   Gastrointestinal: Positive for abdominal pain. Negative for diarrhea, nausea and vomiting.   Neurological: Negative for excessive daytime sleepiness, dizziness, focal weakness, light-headedness, numbness and weakness.   Psychiatric/Behavioral: Negative for depression. The patient does not have insomnia.        Vital Sign Min/Max for last 24 hours  Temp  Min: 97.4 °F (36.3 °C)  Max: 98.6 °F (37 °C)   BP  Min: 107/43  Max: 160/53   Pulse  Min: 35  Max: 69   Resp  Min: 15  Max: 21   SpO2  Min: 88 %  Max: 99 %   No data recorded   Weight  Min: 63.5 kg (139 lb 15.9 oz)  Max: 63.5 kg (139 lb 15.9 oz)         11/08/21  0352   Weight: 63.5 kg (139 lb 15.9 oz)       Physical Exam:   Vitals and nursing note reviewed.   Constitutional:       Appearance: Normal and healthy appearance. Well-developed and not in distress.   Eyes:      Extraocular Movements: Extraocular movements  intact.      Pupils: Pupils are equal, round, and reactive to light.   HENT:      Head: Normocephalic and atraumatic.    Mouth/Throat:      Pharynx: Oropharynx is clear.   Neck:      Vascular: JVD normal.      Trachea: Trachea normal.   Pulmonary:      Effort: Pulmonary effort is normal.      Breath sounds: Normal breath sounds. No wheezing. No rhonchi. No rales.   Cardiovascular:      PMI at left midclavicular line. Normal rate. Regular rhythm. Normal S1. Normal S2.      Murmurs: There is a grade 2/6 systolic murmur.      No gallop. No click. No rub.   Pulses:     Dorsalis pedis: 2+ bilaterally.     Posterior tibial: 2+ bilaterally.  Abdominal:      General: Bowel sounds are normal.      Palpations: Abdomen is soft.      Tenderness: There is no abdominal tenderness.   Musculoskeletal: Normal range of motion.      Cervical back: Normal range of motion and neck supple. Skin:     General: Skin is warm and dry.      Capillary Refill: Capillary refill takes less than 2 seconds.   Feet:      Right foot:      Skin integrity: Skin integrity normal.      Left foot:      Skin integrity: Skin integrity normal.   Neurological:      Mental Status: Alert and oriented to person, place and time.      Cranial Nerves: Cranial nerves are intact.      Sensory: Sensation is intact.      Motor: Motor function is intact.      Coordination: Coordination is intact.   Psychiatric:         Speech: Speech normal.         Behavior: Behavior is cooperative.         Medication Review: yes  Current Facility-Administered Medications   Medication Dose Route Frequency Provider Last Rate Last Admin   • acetaminophen (TYLENOL) tablet 650 mg  650 mg Oral Q4H PRN Andrea Blood DO   650 mg at 11/08/21 0507    Or   • acetaminophen (TYLENOL) suppository 650 mg  650 mg Rectal Q4H PRN Andrea Blood DO       • aluminum-magnesium hydroxide-simethicone (MAALOX MAX) 400-400-40 MG/5ML suspension 15 mL  15 mL Oral Q6H PRN Balbir Cunningham MD   15 mL at  11/08/21 1109   • atorvastatin (LIPITOR) tablet 10 mg  10 mg Oral Nightly Andrea Blood DO   10 mg at 11/07/21 2006   • famotidine (PEPCID) injection 20 mg  20 mg Intravenous Q12H Job Brunson MD   20 mg at 11/08/21 0556   • folic acid (FOLVITE) tablet 1 mg  1 mg Oral Once per day on Mon Wed Fri Andrea Blood DO   1 mg at 11/08/21 0906   • lisinopril (PRINIVIL,ZESTRIL) tablet 10 mg  10 mg Oral Q24H Andrea Blood DO   10 mg at 11/08/21 0906   • ondansetron (ZOFRAN) injection 4 mg  4 mg Intravenous Q6H PRN Andrea Blood DO   4 mg at 11/07/21 1958   • sodium chloride 0.9 % flush 10 mL  10 mL Intravenous PRN Javier Oneill MD       • sodium chloride 0.9 % flush 10 mL  10 mL Intravenous Q12H Andrea Blood DO   10 mL at 11/08/21 0907   • sodium chloride 0.9 % flush 10 mL  10 mL Intravenous PRN Andrea Blood DO       • vitamin B-12 (CYANOCOBALAMIN) tablet 1,000 mcg  1,000 mcg Oral Daily Andrea Blood DO   1,000 mcg at 11/08/21 0906           Assessment/Plan       Bradycardia    Atrial fibrillation (HCC)    Hypertension    Hyperlipidemia      Plan:    We will hold off on placing a pacemaker at this time.  Do not think patient's symptoms are secondary to her heart rate being low.  I believe that she has been in atrial fibrillation with slow ventricular rate for a long time and she tolerates it quite well.    We will continue to follow along.    Juan Francisco Drew,    Interventional cardiology  [facility]  11/08/21  16:53 CST

## 2021-11-08 NOTE — H&P
History and Physical    Patient:  Yudi Westbrook  MRN: [id number]    CHIEF COMPLAINT:  Chest pain    History Obtained From: the patient   PCP: Elia Preston MD    HISTORY OF PRESENT ILLNESS:   The patient is a 89 y.o. female who presents with A history of chronic atrial fibrillation on Coumadin, hypertension, hyperlipidemia, breast cancer status post mastectomies.  She went to the emergency department due to chest discomfort/epigastric pain.  She states this been going on since 11/4/2021.  She denies any vomiting.  Pain has been intermittent.  In the emergency department, she was found to be in atrial fibrillation with heart rate down into the 30s occasionally.  Points to her mid epigastric when asked if she is having any pain at the time of admission.  Troponin negative x2 in the emergency department.  Cardiology consulted from the ED.    REVIEW OF SYSTEMS:    Review of Systems   Constitutional: Positive for fatigue. Negative for activity change.   HENT: Negative for congestion.    Respiratory: Negative for cough, shortness of breath and wheezing.    Cardiovascular: Positive for chest pain. Negative for palpitations and leg swelling.   Gastrointestinal: Positive for abdominal pain. Negative for diarrhea, nausea and vomiting.   Skin: Negative for color change and pallor.   Neurological: Negative for weakness and headaches.          Past Medical History:  Past Medical History:   Diagnosis Date   • Atrial fibrillation (HCC)    • Bradycardia    • Cancer (HCC) 2002    Breast   • Hyperlipidemia    • Hypertension    • Osteoporosis        Past Surgical History:  Past Surgical History:   Procedure Laterality Date   • APPENDECTOMY     • HYSTERECTOMY     • MASTECTOMY Bilateral        Medications Prior to Admission:    Medications Prior to Admission   Medication Sig Dispense Refill Last Dose   • alendronate (Fosamax) 70 MG tablet Take 1 tablet by mouth 1 (One) Time Per Week. 4 tablet 5    • budesonide (RINOCORT  AQUA) 32 MCG/ACT nasal spray INSTILL 1 TO 2 SPRAY(S) IN EACH NOSTRIL ONCE DAILY      • Calcium Carb-Cholecalciferol (CALCIUM 600+D3) 600-200 MG-UNIT tablet Take 1 tablet by mouth Daily.      • folic acid (FOLVITE) 1 MG tablet Take 1 mg by mouth 3 (Three) Times a Week.      • lisinopril-hydrochlorothiazide (PRINZIDE,ZESTORETIC) 10-12.5 MG per tablet Take 1 tablet by mouth Daily.      • Magnesium 250 MG tablet Take 1 tablet by mouth Daily.      • Potassium 99 MG tablet Take 1 tablet by mouth Daily.      • simvastatin (ZOCOR) 20 MG tablet Take 20 mg by mouth Every Night.      • vitamin B-12 (CYANOCOBALAMIN) 1000 MCG tablet Take 1 tablet by mouth Daily.      • Vitamin E 100 units tablet Take 1 tablet by mouth Daily.      • warfarin (COUMADIN) 5 MG tablet Takes 7.5 mg daily except Saturday takes 5 mg      • acetaminophen-codeine (TYLENOL #3) 300-30 MG per tablet Take 1 tablet by mouth Every 4 (Four) Hours As Needed for Moderate Pain . 16 tablet 0    • apixaban (ELIQUIS) 2.5 MG tablet tablet Take 2.5 mg by mouth 2 (Two) Times a Day.      • BIOTIN 5000 PO Take 1 tablet by mouth 1 (One) Time Per Week.      • HYDROcodone-acetaminophen (NORCO) 5-325 MG per tablet Take 1 tablet by mouth 2 (two) times a day. 30 tablet 0    • Multiple Vitamins-Minerals (CENTRUM SILVER ADULT 50+ PO) Take 1 tablet by mouth Daily.      • ondansetron ODT (ZOFRAN-ODT) 4 MG disintegrating tablet Place 1 tablet on the tongue Every 8 (Eight) Hours As Needed for Nausea. 12 tablet 0        Allergies:  Morphine and Penicillins    Social History:   Social History     Socioeconomic History   • Marital status:    Tobacco Use   • Smoking status: Never Smoker   • Smokeless tobacco: Never Used   Substance and Sexual Activity   • Alcohol use: No   • Drug use: No   • Sexual activity: Defer       Family History:   Family History   Problem Relation Age of Onset   • Hypertension Father    • Heart attack Brother    • Cancer Brother            Physical Exam:   "  Vitals: /47   Pulse (!) 45   Temp 98.3 °F (36.8 °C) (Oral)   Resp 18   Ht 167.6 cm (66\")   Wt 63.5 kg (139 lb 15.9 oz)   SpO2 98%   BMI 22.60 kg/m²   Physical Exam  Vitals reviewed.   Constitutional:       Appearance: Normal appearance. She is not ill-appearing.      Comments: Frail-appearing   HENT:      Head: Normocephalic and atraumatic.   Eyes:      General:         Right eye: No discharge.         Left eye: No discharge.      Extraocular Movements: Extraocular movements intact.      Conjunctiva/sclera: Conjunctivae normal.   Cardiovascular:      Rate and Rhythm: Bradycardia present. Rhythm irregular.      Heart sounds: No murmur heard.      Pulmonary:      Effort: Pulmonary effort is normal. No respiratory distress.   Abdominal:      General: Abdomen is flat. Bowel sounds are normal. There is no distension.   Musculoskeletal:      Right lower leg: No edema.      Left lower leg: No edema.   Skin:     Capillary Refill: Capillary refill takes less than 2 seconds.   Neurological:      General: No focal deficit present.      Mental Status: She is alert.   Psychiatric:         Mood and Affect: Mood normal.         Behavior: Behavior normal.           Lab Results (last 24 hours)     Procedure Component Value Units Date/Time    Comprehensive Metabolic Panel [[id number]]  (Abnormal) Collected: 11/08/21 0918    Specimen: Blood Updated: 11/08/21 1001     Glucose 111 mg/dL      BUN 15 mg/dL      Creatinine 0.63 mg/dL      Sodium 138 mmol/L      Potassium 4.4 mmol/L      Chloride 103 mmol/L      CO2 27.0 mmol/L      Calcium 9.6 mg/dL      Total Protein 6.9 g/dL      Albumin 4.50 g/dL      ALT (SGPT) 11 U/L      AST (SGOT) 22 U/L      Alkaline Phosphatase 57 U/L      Total Bilirubin 1.2 mg/dL      eGFR Non African Amer 89 mL/min/1.73      Globulin 2.4 gm/dL      A/G Ratio 1.9 g/dL      BUN/Creatinine Ratio 23.8     Anion Gap 8.0 mmol/L     Narrative:      GFR Normal >60  Chronic Kidney Disease <60  Kidney " Failure <15      CBC (No Diff) [[id number]]  (Normal) Collected: 11/08/21 0918    Specimen: Blood Updated: 11/08/21 0942     WBC 8.29 10*3/mm3      RBC 4.27 10*6/mm3      Hemoglobin 13.2 g/dL      Hematocrit 40.2 %      MCV 94.1 fL      MCH 30.9 pg      MCHC 32.8 g/dL      RDW 13.1 %      RDW-SD 44.9 fl      MPV 11.0 fL      Platelets 194 10*3/mm3     Basic Metabolic Panel [[id number]]  (Abnormal) Collected: 11/08/21 0352    Specimen: Blood Updated: 11/08/21 0433     Glucose 111 mg/dL      BUN 17 mg/dL      Creatinine 0.69 mg/dL      Sodium 140 mmol/L      Potassium 4.4 mmol/L      Chloride 104 mmol/L      CO2 28.0 mmol/L      Calcium 9.5 mg/dL      eGFR Non African Amer 80 mL/min/1.73      BUN/Creatinine Ratio 24.6     Anion Gap 8.0 mmol/L     Narrative:      GFR Normal >60  Chronic Kidney Disease <60  Kidney Failure <15      Magnesium [[id number]]  (Normal) Collected: 11/08/21 0352    Specimen: Blood Updated: 11/08/21 0433     Magnesium 2.0 mg/dL     Protime-INR [[id number]]  (Abnormal) Collected: 11/08/21 0352    Specimen: Blood Updated: 11/08/21 0422     Protime 22.0 Seconds      INR 2.01    Protime-INR [[id number]]  (Abnormal) Collected: 11/07/21 0832    Specimen: Blood from Arm, Right Updated: 11/07/21 1514     Protime 23.4 Seconds      INR 2.18    COVID PRE-OP / PRE-PROCEDURE SCREENING ORDER (NO ISOLATION) - Swab, Nasal Cavity [[id number]]  (Normal) Collected: 11/07/21 1326    Specimen: Swab from Nasal Cavity Updated: 11/07/21 1413    Narrative:      The following orders were created for panel order COVID PRE-OP / PRE-PROCEDURE SCREENING ORDER (NO ISOLATION) - Swab, Nasal Cavity.  Procedure                               Abnormality         Status                     ---------                               -----------         ------                     COVID-19,[facility] IN-NANDO...[[id number]]  Normal              Final result                 Please view results for these tests on the individual orders.     COVID-19,[facility] IN-HOUSE,Nasal Swab No Transport Media 3-4 HR TAT - Swab, Nasal Cavity [[id number]]  (Normal) Collected: 11/07/21 1326    Specimen: Swab from Nasal Cavity Updated: 11/07/21 1413     COVID19 Not Detected    Narrative:      Fact sheet for providers: https://www.fda.gov/media/547105/download     Fact sheet for patients: https://www.[employer].gov/media/931813/download    Test performed by PCR.    Consider negative results in combination with clinical observations, patient history, and epidemiological information.  Fact sheet for providers: https://www.fda.gov/media/799704/download     Fact sheet for patients: https://www.[employer]/media/709722/download    Test performed by PCR.    Consider negative results in combination with clinical observations, patient history, and epidemiological information.           -----------------------------------------------------------------  EKG: Atrial fibrillation with slow ventricular response  Radiology:     XR Chest 1 View    Result Date: 11/7/2021  EXAMINATION: Chest 1 view 11/7/2021  HISTORY: Chest pain  FINDINGS: Today's exam is compared to previous study of 7/24/2020. There is mild cardiomegaly. Lungs are mildly hyperinflated with increased interstitial markings which are chronic in nature. There is no evidence of acute consolidative pneumonia or effusion. There is no free air beneath the hemidiaphragms.      1.. Chronic changes of the lungs. There is mild cardiomegaly. 2. No evidence of lobar pneumonia or effusion. This report was finalized on 11/07/2021 08:18 by Dr. Joseph Santos MD.    CT Angiogram Chest    Result Date: 11/7/2021  Exam: CT angiogram of the of the chest with intravenous contrast.  CLINICAL HISTORY:  Aortic disease. Nontraumatic.  DOSE:  79 mGycm. Automated exposure control was utilized to diminish patient radiation dose.  TECHNIQUE: Contiguous axial images were obtained through the thorax following intravenous contrast administration with  reformatted images obtained in the sagittal and coronal projections from the original data set. Three-dimensional reconstructions are also obtained.  COMPARISON:  None available  FINDINGS:  Pulmonary arteries:  There is normal enhancement of the pulmonary arteries with no evidence of pulmonary thromboembolic disease. There is enlargement of the pulmonary arteries suggesting pulmonary arterial hypertension.  Aorta:  The thoracic aorta is ectatic without evidence of aneurysm or dissection. There is atheromatous calcification of the aortic arch and descending thoracic aorta. The proximal great vessels are remarkable for mild calcific plaquing at the origin of the innominate, left subclavian and left common carotid artery without rate limiting stenosis. The left vertebral artery emanates directly from the aortic arch. There is significant disease at the origin of the left vertebral artery with at least moderate stenosis.  LUNGS:  There is scarring within the lung apices. Bibasilar atelectasis as well as left lingular atelectasis is present. There is no evidence of consolidative pneumonia or effusion. No evidence of nodularity or mass.  Pleural spaces: Unremarkable. No evidence of pleural effusion or pneumothorax.  HEART: There is moderate cardiomegaly. There is elevated right heart pressure with enlargement of the right atrium and right ventricle and reflux of contrast into the intrahepatic IVC which is dilated. There is no evidence of pericardial effusion. Mild coronary calcifications are present.  Bones: No acute osseous abnormalities are demonstrated. There is a moderate wedge compression deformity involving the superior endplate of L1 of uncertain acuity this was documented on plain film radiographs dated back to the summer 2020.  CHEST WALL: No chest wall abnormalities are demonstrated.  Lymph nodes: No enlarged mediastinal or axillary lymphadenopathy is present.  Upper abdomen: There is a moderate to large size  "hiatal hernia. The adrenals are unremarkable.      1. No evidence of pulmonary thromboembolic disease. There is enlargement of the pulmonary arteries suggesting pulmonary arterial hypertension. 2. Moderate cardiomegaly. Coronary calcifications are present. There is both left and right chamber enlargement present. There is elevated right heart pressure with reflux of contrast into the intrahepatic IVC which is dilated. 3. The thoracic aorta is ectatic but without evidence of dissection or aneurysm. There is mild calcific plaquing at the origin of the great vessels without evidence of associated stenosis within the innominate, left common carotid and left subclavian artery. The left vertebral artery emanates directly from the aortic arch and there is more significant stenosis at its origin with at least moderate cross-sectional narrowing. The left vertebral artery does otherwise demonstrate normal enhancement proximally. 4. Bilateral lower lobe and left lingular atelectasis. No evidence of consolidative pneumonia or effusion. This report was finalized on 11/07/2021 11:06 by Dr. Joseph Santos MD.      Assessment and Plan       Bradycardia    Atrial fibrillation (HCC)    Hypertension    Hyperlipidemia    Bradycardia: Presumed symptomatic bradycardia given her midepigastric/chest pain over the past few days prior to admission.  Negative troponin in the emergency department.  EKG shows atrial fibrillation with a slow ventricular response.  Blood pressure stable.  Cardiology consulted to consider pacemaker placement.    Epigastric pain: Initially presumed to be due to symptomatic bradycardia, but there may be a component of involvement of the gastrointestinal system.  She describes dysphagia with pills.  I will give her a GI cocktail and consult gastroenterology to further evaluate.  She may need an EGD as she has had one in the past and was told she \"was narrow\".    Atrial fibrillation: See bradycardia above.  Not on " any beta-blocker.  Anticoagulated with Coumadin.    Hypertension: Continue home medications.    Hyperlipidemia: Continue home medication.    Balbir Cunningham MD 11/8/2021 12:05 CST

## 2021-11-08 NOTE — CASE MANAGEMENT/SOCIAL WORK
Discharge Planning Assessment  [facility]     Patient Name: Yudi Westbrook  MRN: [id number]  Today's Date: 11/8/2021    Admit Date: 11/7/2021     Discharge Needs Assessment     Row Name 11/08/21 1034       Living Environment    Lives With alone    Current Living Arrangements home/apartment/condo    Primary Care Provided by self    Provides Primary Care For no one    Family Caregiver if Needed child(madie), adult    Family Caregiver Names Son - Gregg Westbrook    Quality of Family Relationships supportive; helpful; involved    Able to Return to Prior Arrangements yes       Resource/Environmental Concerns    Resource/Environmental Concerns none       Transition Planning    Patient/Family Anticipates Transition to home; home with help/services    Patient/Family Anticipated Services at Transition     Transportation Anticipated family or friend will provide       Discharge Needs Assessment    Readmission Within the Last 30 Days no previous admission in last 30 days    Equipment Currently Used at Home power chair,(recliner lift)    Concerns to be Addressed discharge planning    Anticipated Changes Related to Illness none    Equipment Needed After Discharge none    Current Discharge Risk chronically ill; lives alone    Discharge Coordination/Progress SW spoke with patient's son, Gregg Westbrook, regarding discharge plan/needs, patient was present.  Son states patient resides alone and functions independently, and still drives.  Patient has a PCP and RX coverage.  Patient does have a recliner lift chair but does not utilize DME.  Patient plans to return home upon discharge.  SW will follow for any needs.               Discharge Plan    No documentation.               Continued Care and Services - Admitted Since 11/7/2021    Coordination has not been started for this encounter.          Demographic Summary    No documentation.                Functional Status    No documentation.                Psychosocial    No  documentation.                Abuse/Neglect    No documentation.                Legal    No documentation.                Substance Abuse    No documentation.                Patient Forms    No documentation.                   MILES RushW

## 2021-11-08 NOTE — NURSING NOTE
"Pt maintained a-fib bradycardia last night (30-60). Pt BP was supported however and she remained normotensive. Pt described her pain as sharp, worse with deep breath, and placed her fist into her epigastric region. Pt also stated that she often had trouble swallowing her pills unless she had oatmeal because it often felt like her pills were \"hung.\"    ECGs were obtained q15m after her initial c/o chest pain as per order. Cardiology (Dr. Luna) was notified and deemed the situation as \"non-cardiac\" in nature with recommendation to contact hospitalist for a gastric solution to discomfort. Pepcid was administered and pt reported some relief.     "

## 2021-11-08 NOTE — NURSING NOTE
Pt son was at bedside. He stated that he forgot to bring up paperwork regarding patient's living will and stated that he would return in the morning with it when he comes for visitation.

## 2021-11-09 ENCOUNTER — ANESTHESIA (OUTPATIENT)
Dept: GASTROENTEROLOGY | Facility: HOSPITAL | Age: 86
End: 2021-11-09

## 2021-11-09 ENCOUNTER — ANESTHESIA EVENT (OUTPATIENT)
Dept: GASTROENTEROLOGY | Facility: HOSPITAL | Age: 86
End: 2021-11-09

## 2021-11-09 PROBLEM — R13.10 DYSPHAGIA: Status: ACTIVE | Noted: 2021-11-09

## 2021-11-09 LAB
ALBUMIN SERPL-MCNC: 3.9 G/DL (ref 3.5–5.2)
ALBUMIN/GLOB SERPL: 1.6 G/DL
ALP SERPL-CCNC: 50 U/L (ref 39–117)
ALT SERPL W P-5'-P-CCNC: 13 U/L (ref 1–33)
ANION GAP SERPL CALCULATED.3IONS-SCNC: 10 MMOL/L (ref 5–15)
AST SERPL-CCNC: 15 U/L (ref 1–32)
BILIRUB SERPL-MCNC: 1.4 MG/DL (ref 0–1.2)
BUN SERPL-MCNC: 20 MG/DL (ref 8–23)
BUN/CREAT SERPL: 30.8 (ref 7–25)
CALCIUM SPEC-SCNC: 9.4 MG/DL (ref 8.6–10.5)
CHLORIDE SERPL-SCNC: 100 MMOL/L (ref 98–107)
CO2 SERPL-SCNC: 25 MMOL/L (ref 22–29)
CREAT SERPL-MCNC: 0.65 MG/DL (ref 0.57–1)
DEPRECATED RDW RBC AUTO: 43.1 FL (ref 37–54)
ERYTHROCYTE [DISTWIDTH] IN BLOOD BY AUTOMATED COUNT: 12.9 % (ref 12.3–15.4)
GFR SERPL CREATININE-BSD FRML MDRD: 86 ML/MIN/1.73
GLOBULIN UR ELPH-MCNC: 2.5 GM/DL
GLUCOSE SERPL-MCNC: 147 MG/DL (ref 65–99)
HCT VFR BLD AUTO: 37.6 % (ref 34–46.6)
HGB BLD-MCNC: 12.2 G/DL (ref 12–15.9)
INR PPP: 1.43 (ref 0.91–1.09)
MCH RBC QN AUTO: 30 PG (ref 26.6–33)
MCHC RBC AUTO-ENTMCNC: 32.4 G/DL (ref 31.5–35.7)
MCV RBC AUTO: 92.4 FL (ref 79–97)
PLATELET # BLD AUTO: 183 10*3/MM3 (ref 140–450)
PMV BLD AUTO: 11.3 FL (ref 6–12)
POTASSIUM SERPL-SCNC: 4.2 MMOL/L (ref 3.5–5.2)
PROT SERPL-MCNC: 6.4 G/DL (ref 6–8.5)
PROTHROMBIN TIME: 16.9 SECONDS (ref 11.9–14.6)
RBC # BLD AUTO: 4.07 10*6/MM3 (ref 3.77–5.28)
SODIUM SERPL-SCNC: 135 MMOL/L (ref 136–145)
WBC # BLD AUTO: 8.62 10*3/MM3 (ref 3.4–10.8)

## 2021-11-09 PROCEDURE — 85610 PROTHROMBIN TIME: CPT | Performed by: INTERNAL MEDICINE

## 2021-11-09 PROCEDURE — 85027 COMPLETE CBC AUTOMATED: CPT | Performed by: FAMILY MEDICINE

## 2021-11-09 PROCEDURE — 88305 TISSUE EXAM BY PATHOLOGIST: CPT | Performed by: INTERNAL MEDICINE

## 2021-11-09 PROCEDURE — 80053 COMPREHEN METABOLIC PANEL: CPT | Performed by: FAMILY MEDICINE

## 2021-11-09 PROCEDURE — 25010000002 PROPOFOL 10 MG/ML EMULSION: Performed by: NURSE ANESTHETIST, CERTIFIED REGISTERED

## 2021-11-09 PROCEDURE — 99232 SBSQ HOSP IP/OBS MODERATE 35: CPT | Performed by: EMERGENCY MEDICINE

## 2021-11-09 PROCEDURE — 87081 CULTURE SCREEN ONLY: CPT | Performed by: INTERNAL MEDICINE

## 2021-11-09 PROCEDURE — 43239 EGD BIOPSY SINGLE/MULTIPLE: CPT | Performed by: INTERNAL MEDICINE

## 2021-11-09 PROCEDURE — 88342 IMHCHEM/IMCYTCHM 1ST ANTB: CPT | Performed by: INTERNAL MEDICINE

## 2021-11-09 PROCEDURE — 0DB68ZX EXCISION OF STOMACH, VIA NATURAL OR ARTIFICIAL OPENING ENDOSCOPIC, DIAGNOSTIC: ICD-10-PCS | Performed by: INTERNAL MEDICINE

## 2021-11-09 PROCEDURE — 25010000002 FENTANYL CITRATE (PF) 100 MCG/2ML SOLUTION: Performed by: NURSE ANESTHETIST, CERTIFIED REGISTERED

## 2021-11-09 RX ORDER — SODIUM CHLORIDE 9 MG/ML
100 INJECTION, SOLUTION INTRAVENOUS CONTINUOUS
Status: CANCELLED | OUTPATIENT
Start: 2021-11-09

## 2021-11-09 RX ORDER — MIDAZOLAM HYDROCHLORIDE 1 MG/ML
0.5 INJECTION INTRAMUSCULAR; INTRAVENOUS
Status: CANCELLED | OUTPATIENT
Start: 2021-11-09

## 2021-11-09 RX ORDER — SODIUM CHLORIDE 0.9 % (FLUSH) 0.9 %
10 SYRINGE (ML) INJECTION AS NEEDED
Status: CANCELLED | OUTPATIENT
Start: 2021-11-09

## 2021-11-09 RX ORDER — WARFARIN SODIUM 5 MG/1
7.5 TABLET ORAL
Status: DISCONTINUED | OUTPATIENT
Start: 2021-11-09 | End: 2021-11-10

## 2021-11-09 RX ORDER — SODIUM CHLORIDE 9 MG/ML
INJECTION, SOLUTION INTRAVENOUS CONTINUOUS PRN
Status: DISCONTINUED | OUTPATIENT
Start: 2021-11-09 | End: 2021-11-09 | Stop reason: SURG

## 2021-11-09 RX ORDER — FENTANYL CITRATE 50 UG/ML
INJECTION, SOLUTION INTRAMUSCULAR; INTRAVENOUS AS NEEDED
Status: DISCONTINUED | OUTPATIENT
Start: 2021-11-09 | End: 2021-11-09 | Stop reason: SURG

## 2021-11-09 RX ORDER — WARFARIN SODIUM 5 MG/1
5 TABLET ORAL WEEKLY
Status: DISCONTINUED | OUTPATIENT
Start: 2021-11-13 | End: 2021-11-10

## 2021-11-09 RX ORDER — SODIUM CHLORIDE 0.9 % (FLUSH) 0.9 %
10 SYRINGE (ML) INJECTION EVERY 12 HOURS SCHEDULED
Status: CANCELLED | OUTPATIENT
Start: 2021-11-09

## 2021-11-09 RX ORDER — PROPOFOL 10 MG/ML
VIAL (ML) INTRAVENOUS AS NEEDED
Status: DISCONTINUED | OUTPATIENT
Start: 2021-11-09 | End: 2021-11-09 | Stop reason: SURG

## 2021-11-09 RX ADMIN — SODIUM CHLORIDE: 9 INJECTION, SOLUTION INTRAVENOUS at 13:24

## 2021-11-09 RX ADMIN — FENTANYL CITRATE 50 MCG: 50 INJECTION, SOLUTION INTRAMUSCULAR; INTRAVENOUS at 13:33

## 2021-11-09 RX ADMIN — ACETAMINOPHEN 650 MG: 325 TABLET, FILM COATED ORAL at 10:33

## 2021-11-09 RX ADMIN — FAMOTIDINE 20 MG: 10 INJECTION INTRAVENOUS at 20:22

## 2021-11-09 RX ADMIN — ALUMINUM HYDROXIDE, MAGNESIUM HYDROXIDE, AND DIMETHICONE 15 ML: 400; 400; 40 SUSPENSION ORAL at 05:41

## 2021-11-09 RX ADMIN — PROPOFOL 50 MG: 10 INJECTION, EMULSION INTRAVENOUS at 13:36

## 2021-11-09 RX ADMIN — PROPOFOL 50 MG: 10 INJECTION, EMULSION INTRAVENOUS at 13:40

## 2021-11-09 RX ADMIN — ACETAMINOPHEN 650 MG: 325 TABLET, FILM COATED ORAL at 03:03

## 2021-11-09 RX ADMIN — PROPOFOL 50 MG: 10 INJECTION, EMULSION INTRAVENOUS at 13:33

## 2021-11-09 RX ADMIN — WARFARIN SODIUM 7.5 MG: 5 TABLET ORAL at 19:43

## 2021-11-09 RX ADMIN — SODIUM CHLORIDE, PRESERVATIVE FREE 10 ML: 5 INJECTION INTRAVENOUS at 20:22

## 2021-11-09 RX ADMIN — PROPOFOL 50 MG: 10 INJECTION, EMULSION INTRAVENOUS at 13:43

## 2021-11-09 RX ADMIN — SODIUM CHLORIDE, PRESERVATIVE FREE 10 ML: 5 INJECTION INTRAVENOUS at 09:09

## 2021-11-09 RX ADMIN — FAMOTIDINE 20 MG: 10 INJECTION INTRAVENOUS at 09:09

## 2021-11-09 RX ADMIN — ATORVASTATIN CALCIUM 10 MG: 10 TABLET, FILM COATED ORAL at 20:22

## 2021-11-09 RX ADMIN — GLYCOPYRROLATE 0.4 MCG: 0.2 INJECTION, SOLUTION INTRAMUSCULAR; INTRAVENOUS at 13:26

## 2021-11-09 NOTE — CONSULTS
Inpatient Gastroenterology Service    Consultation    Juanito Perez MD, FACP    11/8/2021  18:03 CST    Consultation received from:    GI Office: routine    Consult received:  2021.11.08@1039CST        Patient referred for evaluation of dysphagia    History is obtained from the patient, the family, the EMR, the nursing staff        Chief complaint  -lower substernal discomfort        History of Present Illness  -patient with atypical chest pain, worsened with deep inspiration or cough, and dysphagia primarily to pills.  She also reports that she has dysphagia to solids if she does not chew carefully.  She had esophageal dilation in the past, probably more than five years ago.    -denies hematemesis, hematochezia, melena, nausea, emesis, diarrhea, constipation, pyrosis, regurgitation, odynophagia, altered bowel habit, change in stool, choluria, acholic stool, or jaundice        Past Medical History  -HTN  -Dyslipidemia  -A fib  -breast cancer  -esophageal stenosis        Past Surgical History  -hysterectomy  -mastectomy  -appendectomy        Past Endoscopy History  -most recent EGD: >5years with E dil  -most recent colonoscopy: prior to age 70?        Past Anesthesia/Sedation History  -no known prior issues with anesthesia or sedation        Past Procedural History  -heart cath: 0  -coronary artery stents: 0        Family History  -no report of family history of: gastric cancer, pancreatic cancer, colorectal cancer, breast cancer, ovarian cancer, uterine cancer, colorectal polyps, inflammatory bowel disease, Crohn's disease, ulcerative colitis, celiac disease, peptic ulcer disease, pancreatitis, hepatitis, cirrhosis        Social History  -  -tobacco use: denies   -ethanol use: denies  -illicit/recreational substance and THC use: denies        Medications  -see EMR lists  -warfarin  -Eliquis is listed, but patient denies ever taking this due to cost        Physical Exam  General  -appears stated  age  -no acute distress  -no outstanding physical abnormality    HEENT  -normocephalic  -atraumatic  -no drainage  -no bleeding  -mucosa appears moist     Neurological  -alert  -oriented  -normal speech  -no slowed response  -no tremor  -no obvious focal deficit  -moves all extremities without obvious abnormality    Psychiatric  -normal mood  -appropriate responses to questions  -no unusual behaviors noted    Skin (evaluation limited to exposed skin)  -dry  -no diaphoresis  -no icterus  -no rash     Neck  -supple  -normal passive ROM  -no JVD    Pulmonary  -normal respiratory effort  -no respiratory distress  -no stridor    Cardiovascular  -normal rate  -regular rhythm    Abdomen  -benign  -soft        Laboratory of Note  -see EMR  -INR = 2.01        Imaging of Note  -see EMR          Assessment  -atypical chest pain worse with deep inspiration, admitted with marked bradycardia   -dysphagia and history of requiring esophageal dilation, no tobacco or ethanol use history        Recommendations  -profound bradycardia will need to be addressed definitively prior to elective endoscopy  -acid suppression  -careful chewing, copious liquids with meals  -avoid agents toxic to GI tract mucosa, anticoagulant/antiplatelet agents, as well as caustic agents (Vit C, Fe, tetracyclines, Fosamax, etc.), as clinically practicable  -will plan EGD once bradycardia is addressed, and when INR <=1.5.  This could be done outpatient if patient is to be discharged soon, but completion may be best performed prior to discharge if otherwise possible.          Thank you for allowing us to participate in the care of this patient.    Sincerely,    VERENICE Perez MD, [facility]  [facility] Inpatient Gastroenterology Service

## 2021-11-09 NOTE — ANESTHESIA POSTPROCEDURE EVALUATION
Patient: Yudi Westbrook    Procedure Summary     Date: 11/09/21 Room / Location: [facility] ENDOSCOPY 5 / [facility] ENDOSCOPY    Anesthesia Start: 1322 Anesthesia Stop: 1354    Procedure: ESOPHAGOGASTRODUODENOSCOPY WITH ANESTHESIA (N/A ) Diagnosis:     Surgeons: Juanito Perez MD Provider: Talib Sauer CRNA    Anesthesia Type: MAC ASA Status: 3          Anesthesia Type: MAC    Vitals  No vitals data found for the desired time range.          Post Anesthesia Care and Evaluation    Patient location during evaluation: PHASE II  Patient participation: complete - patient participated  Level of consciousness: awake  Pain score: 0  Pain management: adequate  Airway patency: patent  Anesthetic complications: No anesthetic complications  PONV Status: none  Cardiovascular status: acceptable  Respiratory status: acceptable  Hydration status: acceptable

## 2021-11-09 NOTE — H&P (VIEW-ONLY)
Inpatient Gastroenterology Service    Consultation    Juanito Perez MD, FACP    11/8/2021  18:03 CST    Consultation received from:    GI Office: routine    Consult received:  2021.11.08@1039CST        Patient referred for evaluation of dysphagia    History is obtained from the patient, the family, the EMR, the nursing staff        Chief complaint  -lower substernal discomfort        History of Present Illness  -patient with atypical chest pain, worsened with deep inspiration or cough, and dysphagia primarily to pills.  She also reports that she has dysphagia to solids if she does not chew carefully.  She had esophageal dilation in the past, probably more than five years ago.    -denies hematemesis, hematochezia, melena, nausea, emesis, diarrhea, constipation, pyrosis, regurgitation, odynophagia, altered bowel habit, change in stool, choluria, acholic stool, or jaundice        Past Medical History  -HTN  -Dyslipidemia  -A fib  -breast cancer  -esophageal stenosis        Past Surgical History  -hysterectomy  -mastectomy  -appendectomy        Past Endoscopy History  -most recent EGD: >5years with E dil  -most recent colonoscopy: prior to age 70?        Past Anesthesia/Sedation History  -no known prior issues with anesthesia or sedation        Past Procedural History  -heart cath: 0  -coronary artery stents: 0        Family History  -no report of family history of: gastric cancer, pancreatic cancer, colorectal cancer, breast cancer, ovarian cancer, uterine cancer, colorectal polyps, inflammatory bowel disease, Crohn's disease, ulcerative colitis, celiac disease, peptic ulcer disease, pancreatitis, hepatitis, cirrhosis        Social History  -  -tobacco use: denies   -ethanol use: denies  -illicit/recreational substance and THC use: denies        Medications  -see EMR lists  -warfarin  -Eliquis is listed, but patient denies ever taking this due to cost        Physical Exam  General  -appears stated  age  -no acute distress  -no outstanding physical abnormality    HEENT  -normocephalic  -atraumatic  -no drainage  -no bleeding  -mucosa appears moist     Neurological  -alert  -oriented  -normal speech  -no slowed response  -no tremor  -no obvious focal deficit  -moves all extremities without obvious abnormality    Psychiatric  -normal mood  -appropriate responses to questions  -no unusual behaviors noted    Skin (evaluation limited to exposed skin)  -dry  -no diaphoresis  -no icterus  -no rash     Neck  -supple  -normal passive ROM  -no JVD    Pulmonary  -normal respiratory effort  -no respiratory distress  -no stridor    Cardiovascular  -normal rate  -regular rhythm    Abdomen  -benign  -soft        Laboratory of Note  -see EMR  -INR = 2.01        Imaging of Note  -see EMR          Assessment  -atypical chest pain worse with deep inspiration, admitted with marked bradycardia   -dysphagia and history of requiring esophageal dilation, no tobacco or ethanol use history        Recommendations  -profound bradycardia will need to be addressed definitively prior to elective endoscopy  -acid suppression  -careful chewing, copious liquids with meals  -avoid agents toxic to GI tract mucosa, anticoagulant/antiplatelet agents, as well as caustic agents (Vit C, Fe, tetracyclines, Fosamax, etc.), as clinically practicable  -will plan EGD once bradycardia is addressed, and when INR <=1.5.  This could be done outpatient if patient is to be discharged soon, but completion may be best performed prior to discharge if otherwise possible.          Thank you for allowing us to participate in the care of this patient.    Sincerely,    VERENICE Perez MD, [facility]  [facility] Inpatient Gastroenterology Service

## 2021-11-09 NOTE — PLAN OF CARE
Goal Outcome Evaluation:  Plan of Care Reviewed With: patient, son        Progress: no change   Ms Westbrook has continued to have epigastric discomfort today.  Pepcid, GI cocktail given, no change in symptoms.  Dr Perez plans to scope upper GI when INR is 1.5

## 2021-11-09 NOTE — PROGRESS NOTES
Daily Progress Note  Yudi Westbrook  MRN: [id number] LOS: 2    Admit Date: 11/7/2021 11/9/2021 08:01 CST    Subjective:          Chief Complaint:  Chief Complaint   Patient presents with   • Chest Pain       Interval History:    Reviewed overnight events and nursing notes.   Pain improved with Pepcid, little change with GI cocktail. Plans for possible EGD today with GI. Cardiology decided against pacemaker placement.     Review of Systems   Constitutional: Positive for activity change and appetite change. Negative for fever.   Respiratory: Negative for cough and shortness of breath.    Cardiovascular: Positive for chest pain. Negative for palpitations and leg swelling.   Gastrointestinal: Positive for abdominal pain and nausea. Negative for blood in stool, constipation, diarrhea and vomiting.   Skin: Negative for color change and pallor.   Neurological: Negative for dizziness and headaches.   Psychiatric/Behavioral: Negative for agitation.       DIET:  Diet Regular; Cardiac    Medications:      atorvastatin, 10 mg, Oral, Nightly  famotidine, 20 mg, Intravenous, Q12H  folic acid, 1 mg, Oral, Once per day on Mon Wed Fri  lisinopril, 10 mg, Oral, Q24H  sodium chloride, 10 mL, Intravenous, Q12H  vitamin B-12, 1,000 mcg, Oral, Daily        Data:     Code Status:   Code Status and Medical Interventions:   Ordered at: 11/07/21 7271     Level Of Support Discussed With:    Patient     Code Status (Patient has no pulse and is not breathing):    CPR (Attempt to Resuscitate)     Medical Interventions (Patient has pulse or is breathing):    Full Support       Family History   Problem Relation Age of Onset   • Hypertension Father    • Heart attack Brother    • Cancer Brother      Social History     Socioeconomic History   • Marital status:    Tobacco Use   • Smoking status: Never Smoker   • Smokeless tobacco: Never Used   Substance and Sexual Activity   • Alcohol use: No   • Drug use: No   • Sexual activity: Defer        Labs:    Lab Results (last 72 hours)     Procedure Component Value Units Date/Time    Comprehensive Metabolic Panel [[id number]]  (Abnormal) Collected: 11/09/21 0401    Specimen: Blood Updated: 11/09/21 0504     Glucose 147 mg/dL      BUN 20 mg/dL      Creatinine 0.65 mg/dL      Sodium 135 mmol/L      Potassium 4.2 mmol/L      Chloride 100 mmol/L      CO2 25.0 mmol/L      Calcium 9.4 mg/dL      Total Protein 6.4 g/dL      Albumin 3.90 g/dL      ALT (SGPT) 13 U/L      AST (SGOT) 15 U/L      Alkaline Phosphatase 50 U/L      Total Bilirubin 1.4 mg/dL      eGFR Non African Amer 86 mL/min/1.73      Globulin 2.5 gm/dL      A/G Ratio 1.6 g/dL      BUN/Creatinine Ratio 30.8     Anion Gap 10.0 mmol/L     Narrative:      GFR Normal >60  Chronic Kidney Disease <60  Kidney Failure <15      Protime-INR [[id number]]  (Abnormal) Collected: 11/09/21 0401    Specimen: Blood Updated: 11/09/21 0450     Protime 16.9 Seconds      INR 1.43    CBC (No Diff) [[id number]]  (Normal) Collected: 11/09/21 0401    Specimen: Blood Updated: 11/09/21 0438     WBC 8.62 10*3/mm3      RBC 4.07 10*6/mm3      Hemoglobin 12.2 g/dL      Hematocrit 37.6 %      MCV 92.4 fL      MCH 30.0 pg      MCHC 32.4 g/dL      RDW 12.9 %      RDW-SD 43.1 fl      MPV 11.3 fL      Platelets 183 10*3/mm3     Comprehensive Metabolic Panel [[id number]]  (Abnormal) Collected: 11/08/21 0918    Specimen: Blood Updated: 11/08/21 1001     Glucose 111 mg/dL      BUN 15 mg/dL      Creatinine 0.63 mg/dL      Sodium 138 mmol/L      Potassium 4.4 mmol/L      Chloride 103 mmol/L      CO2 27.0 mmol/L      Calcium 9.6 mg/dL      Total Protein 6.9 g/dL      Albumin 4.50 g/dL      ALT (SGPT) 11 U/L      AST (SGOT) 22 U/L      Alkaline Phosphatase 57 U/L      Total Bilirubin 1.2 mg/dL      eGFR Non African Amer 89 mL/min/1.73      Globulin 2.4 gm/dL      A/G Ratio 1.9 g/dL      BUN/Creatinine Ratio 23.8     Anion Gap 8.0 mmol/L     Narrative:      GFR Normal >60  Chronic Kidney Disease  <60  Kidney Failure <15      CBC (No Diff) [[id number]]  (Normal) Collected: 11/08/21 0918    Specimen: Blood Updated: 11/08/21 0942     WBC 8.29 10*3/mm3      RBC 4.27 10*6/mm3      Hemoglobin 13.2 g/dL      Hematocrit 40.2 %      MCV 94.1 fL      MCH 30.9 pg      MCHC 32.8 g/dL      RDW 13.1 %      RDW-SD 44.9 fl      MPV 11.0 fL      Platelets 194 10*3/mm3     Basic Metabolic Panel [[id number]]  (Abnormal) Collected: 11/08/21 0352    Specimen: Blood Updated: 11/08/21 0433     Glucose 111 mg/dL      BUN 17 mg/dL      Creatinine 0.69 mg/dL      Sodium 140 mmol/L      Potassium 4.4 mmol/L      Chloride 104 mmol/L      CO2 28.0 mmol/L      Calcium 9.5 mg/dL      eGFR Non African Amer 80 mL/min/1.73      BUN/Creatinine Ratio 24.6     Anion Gap 8.0 mmol/L     Narrative:      GFR Normal >60  Chronic Kidney Disease <60  Kidney Failure <15      Magnesium [[id number]]  (Normal) Collected: 11/08/21 0352    Specimen: Blood Updated: 11/08/21 0433     Magnesium 2.0 mg/dL     Protime-INR [[id number]]  (Abnormal) Collected: 11/08/21 0352    Specimen: Blood Updated: 11/08/21 0422     Protime 22.0 Seconds      INR 2.01    Protime-INR [[id number]]  (Abnormal) Collected: 11/07/21 0832    Specimen: Blood from Arm, Right Updated: 11/07/21 1514     Protime 23.4 Seconds      INR 2.18    COVID PRE-OP / PRE-PROCEDURE SCREENING ORDER (NO ISOLATION) - Swab, Nasal Cavity [[id number]]  (Normal) Collected: 11/07/21 1326    Specimen: Swab from Nasal Cavity Updated: 11/07/21 1413    Narrative:      The following orders were created for panel order COVID PRE-OP / PRE-PROCEDURE SCREENING ORDER (NO ISOLATION) - Swab, Nasal Cavity.  Procedure                               Abnormality         Status                     ---------                               -----------         ------                     COVID-19,[facility] IN-NANDO...[[id number]]  Normal              Final result                 Please view results for these tests on the individual orders.     COVID-19,[facility] IN-HOUSE,Nasal Swab No Transport Media 3-4 HR TAT - Swab, Nasal Cavity [[id number]]  (Normal) Collected: 11/07/21 1326    Specimen: Swab from Nasal Cavity Updated: 11/07/21 1413     COVID19 Not Detected    Narrative:      Fact sheet for providers: https://www.fda.gov/media/974389/download     Fact sheet for patients: https://www.fda.gov/media/376325/download    Test performed by PCR.    Consider negative results in combination with clinical observations, patient history, and epidemiological information.  Fact sheet for providers: https://www.fda.gov/media/131907/download     Fact sheet for patients: https://www.fda.gov/media/721253/download    Test performed by PCR.    Consider negative results in combination with clinical observations, patient history, and epidemiological information.    Troponin [[id number]]  (Normal) Collected: 11/07/21 1115    Specimen: Blood Updated: 11/07/21 1140     Troponin T <0.010 ng/mL     Narrative:      Troponin T Reference Range:  <= 0.03 ng/mL-   Negative for AMI  >0.03 ng/mL-     Abnormal for myocardial necrosis.  Clinicians would have to utilize clinical acumen, EKG, Troponin and serial changes to determine if it is an Acute Myocardial Infarction or myocardial injury due to an underlying chronic condition.       Results may be falsely decreased if patient taking Biotin.      [place] Draw [[id number]] Collected: 11/07/21 0832    Specimen: Blood from Arm, Right Updated: 11/07/21 0945    Narrative:      The following orders were created for panel order [place] Draw.  Procedure                               Abnormality         Status                     ---------                               -----------         ------                     Green Top (Gel)[[id number]]                                  Final result               Lavender Top[[id number]]                                     Final result               Red Top[[id number]]                                           Final result               Light Blue Top[[id number]]                                   Final result                 Please view results for these tests on the individual orders.    Green Top (Gel) [[id number]] Collected: 11/07/21 0832    Specimen: Blood from Arm, Right Updated: 11/07/21 0945     Extra Tube Hold for add-ons.     Comment: Auto resulted.       Light Blue Top [[id number]] Collected: 11/07/21 0832    Specimen: Blood from Arm, Right Updated: 11/07/21 0945     Extra Tube hold for add-on     Comment: Auto resulted       Lavender Top [[id number]] Collected: 11/07/21 0832    Specimen: Blood from Arm, Right Updated: 11/07/21 0945     Extra Tube hold for add-on     Comment: Auto resulted       Red Top [[id number]] Collected: 11/07/21 0832    Specimen: Blood from Arm, Right Updated: 11/07/21 0945     Extra Tube Hold for add-ons.     Comment: Auto resulted.       Comprehensive Metabolic Panel [[id number]]  (Abnormal) Collected: 11/07/21 0832    Specimen: Blood from Arm, Right Updated: 11/07/21 0927     Glucose 106 mg/dL      BUN 16 mg/dL      Creatinine 0.59 mg/dL      Sodium 139 mmol/L      Potassium 4.1 mmol/L      Chloride 102 mmol/L      CO2 28.0 mmol/L      Calcium 10.0 mg/dL      Total Protein 7.0 g/dL      Albumin 4.60 g/dL      ALT (SGPT) 15 U/L      AST (SGOT) 21 U/L      Alkaline Phosphatase 57 U/L      Total Bilirubin 1.2 mg/dL      eGFR Non African Amer 96 mL/min/1.73      Globulin 2.4 gm/dL      A/G Ratio 1.9 g/dL      BUN/Creatinine Ratio 27.1     Anion Gap 9.0 mmol/L     Narrative:      GFR Normal >60  Chronic Kidney Disease <60  Kidney Failure <15      Troponin [[id number]]  (Normal) Collected: 11/07/21 0832    Specimen: Blood from Arm, Right Updated: 11/07/21 0925     Troponin T <0.010 ng/mL     Narrative:      Troponin T Reference Range:  <= 0.03 ng/mL-   Negative for AMI  >0.03 ng/mL-     Abnormal for myocardial necrosis.  Clinicians would have to utilize clinical acumen, EKG, Troponin and serial  changes to determine if it is an Acute Myocardial Infarction or myocardial injury due to an underlying chronic condition.       Results may be falsely decreased if patient taking Biotin.      BNP [[id number]]  (Normal) Collected: 11/07/21 0832    Specimen: Blood from Arm, Right Updated: 11/07/21 0923     proBNP 1,182.0 pg/mL     Narrative:      Among patients with dyspnea, NT-proBNP is highly sensitive for the detection of acute congestive heart failure. In addition NT-proBNP of <300 pg/ml effectively rules out acute congestive heart failure with 99% negative predictive value.    Results may be falsely decreased if patient taking Biotin.      D-dimer, Quantitative [[id number]]  (Normal) Collected: 11/07/21 0832    Specimen: Blood from Arm, Right Updated: 11/07/21 0923     D-Dimer, Quantitative <0.22 mg/L (FEU)     Narrative:      Reference Range is 0-0.50 mg/L FEU. However, results <0.50 mg/L FEU tends to rule out DVT or PE. Results >0.50 mg/L FEU are not useful in predicting absence or presence of DVT or PE.      CBC & Differential [[id number]]  (Normal) Collected: 11/07/21 0832    Specimen: Blood from Arm, Right Updated: 11/07/21 0906    Narrative:      The following orders were created for panel order CBC & Differential.  Procedure                               Abnormality         Status                     ---------                               -----------         ------                     CBC Auto Differential[[id number]]        Normal              Final result                 Please view results for these tests on the individual orders.    CBC Auto Differential [[id number]]  (Normal) Collected: 11/07/21 0832    Specimen: Blood from Arm, Right Updated: 11/07/21 0906     WBC 5.97 10*3/mm3      RBC 4.30 10*6/mm3      Hemoglobin 13.5 g/dL      Hematocrit 40.1 %      MCV 93.3 fL      MCH 31.4 pg      MCHC 33.7 g/dL      RDW 13.0 %      RDW-SD 44.4 fl      MPV 10.7 fL      Platelets 198 10*3/mm3      Neutrophil % 59.7  "%      Lymphocyte % 27.1 %      Monocyte % 10.2 %      Eosinophil % 1.5 %      Basophil % 1.0 %      Immature Grans % 0.5 %      Neutrophils, Absolute 3.56 10*3/mm3      Lymphocytes, Absolute 1.62 10*3/mm3      Monocytes, Absolute 0.61 10*3/mm3      Eosinophils, Absolute 0.09 10*3/mm3      Basophils, Absolute 0.06 10*3/mm3      Immature Grans, Absolute 0.03 10*3/mm3      nRBC 0.0 /100 WBC             Objective:     Vitals: /52   Pulse (!) 48   Temp 98.5 °F (36.9 °C) (Oral)   Resp 21   Ht 167.6 cm (66\")   Wt 64.1 kg (141 lb 5 oz)   SpO2 96%   BMI 22.81 kg/m²      Intake/Output Summary (Last 24 hours) at 2021 0801  Last data filed at 2021 0305  Gross per 24 hour   Intake 930 ml   Output 725 ml   Net 205 ml    Temp (24hrs), Av.6 °F (37 °C), Min:98.2 °F (36.8 °C), Max:98.9 °F (37.2 °C)      Physical Exam  Vitals reviewed.   Constitutional:       Appearance: Normal appearance. She is not ill-appearing.      Comments: Frail-appearing   HENT:      Head: Normocephalic and atraumatic.   Eyes:      General:         Right eye: No discharge.         Left eye: No discharge.      Extraocular Movements: Extraocular movements intact.      Conjunctiva/sclera: Conjunctivae normal.   Cardiovascular:      Rate and Rhythm: Bradycardia present. Rhythm irregular.      Pulses: Normal pulses.      Heart sounds: No murmur heard.      Pulmonary:      Effort: Pulmonary effort is normal. No respiratory distress.   Abdominal:      General: Abdomen is flat. Bowel sounds are normal. There is no distension.      Tenderness: There is abdominal tenderness (midepigastric).   Musculoskeletal:      Right lower leg: No edema.      Left lower leg: No edema.   Skin:     Capillary Refill: Capillary refill takes less than 2 seconds.   Neurological:      General: No focal deficit present.      Mental Status: She is alert.   Psychiatric:         Mood and Affect: Mood normal.         Behavior: Behavior normal.             Assessment " and Plan:     Primary Problem:  Bradycardia    Hospital Problem list:    Bradycardia    Atrial fibrillation (HCC)    Hypertension    Hyperlipidemia    Dysphagia      PMH:  Past Medical History:   Diagnosis Date   • Atrial fibrillation (HCC)    • Bradycardia    • Cancer (HCC) 2002    Breast   • Hyperlipidemia    • Hypertension    • Osteoporosis        Treatment Plan:    Bradycardia: Presumed symptomatic bradycardia given her midepigastric/chest pain over the past few days prior to admission.  Negative troponin in the emergency department.  EKG shows atrial fibrillation with a slow ventricular response.  Blood pressure stable.    -Cardiology consulted, ultimately decided against pacemaker placement.     Epigastric pain/dysphagia: Initially presumed to be due to symptomatic bradycardia, but there may be a component of involvement of the gastrointestinal system.  She describes dysphagia with pills.  Little improvement with GI cocktail, but seems to improve with Pepcid which is being given twice daily.  -Continue Pepcid.  -GI consulted and plans to perform EGD when INR is < 1.5. INR is 1.43 today.     Atrial fibrillation: See bradycardia above.  Not on any beta-blocker.  Anticoagulated with Coumadin (held for EGD).     Hypertension: Continue home medications.     Hyperlipidemia: Continue home medication.    Disposition: Plan for EGD today with GI.    Reviewed treatment plans with the patient and/or family.   45 minutes spent in face to face interaction and coordination of care.     Electronically signed by Balbir Cunningham MD on 11/9/2021 at 08:01 CST

## 2021-11-09 NOTE — ANESTHESIA PREPROCEDURE EVALUATION
Anesthesia Evaluation     Patient summary reviewed   NPO Solid Status: > 8 hours             Airway   Mallampati: II  Dental      Pulmonary    (-) COPD, asthma, sleep apnea, not a smoker  Cardiovascular     (+) hypertension, dysrhythmias Atrial Fib, hyperlipidemia,   (-) pacemaker, past MI, angina, cardiac stents      Neuro/Psych  (-) seizures, TIA, CVA  GI/Hepatic/Renal/Endo    (-) GERD, liver disease, no renal disease, diabetes    Musculoskeletal     Abdominal    Substance History      OB/GYN          Other        ROS/Med Hx Other: A/w chest pain, a fib and bradycardia.  Seen by cardiology and cleared for EGD to eval non cardiac cp                  Anesthesia Plan    ASA 3     MAC       Anesthetic plan, all risks, benefits, and alternatives have been provided, discussed and informed consent has been obtained with: patient.

## 2021-11-09 NOTE — PROGRESS NOTES
[facility] -  Progress Note     LOS: 2 days   Patient Care Team:  Elia Preston MD as PCP - General (Family Medicine)  Elia Preston MD as Consulting Physician (Family Medicine)        Subjective     Interval History:     Patient seen and examined at bedside.  No new problems overnight.  She continues to have the pain in her mid epigastric region.    Review of Systems:  Review of Systems   Constitutional: Negative for diaphoresis, fever and malaise/fatigue.   HENT: Negative for congestion.    Eyes: Negative for vision loss in left eye and vision loss in right eye.   Cardiovascular: Positive for chest pain. Negative for claudication, dyspnea on exertion, irregular heartbeat, leg swelling, orthopnea, palpitations and syncope.   Respiratory: Negative for cough, shortness of breath and wheezing.    Hematologic/Lymphatic: Negative for adenopathy.   Skin: Negative for rash.   Musculoskeletal: Negative for joint pain and joint swelling.   Gastrointestinal: Positive for abdominal pain and nausea. Negative for diarrhea and vomiting.   Neurological: Negative for excessive daytime sleepiness, dizziness, focal weakness, light-headedness, numbness and weakness.   Psychiatric/Behavioral: Negative for depression. The patient does not have insomnia.        Vital Sign Min/Max for last 24 hours  Temp  Min: 98.2 °F (36.8 °C)  Max: 98.9 °F (37.2 °C)   BP  Min: 118/52  Max: 157/61   Pulse  Min: 42  Max: 80   Resp  Min: 16  Max: 21   SpO2  Min: 90 %  Max: 97 %   No data recorded   Weight  Min: 64.1 kg (141 lb 5 oz)  Max: 64.1 kg (141 lb 5 oz)         11/09/21  0500   Weight: 64.1 kg (141 lb 5 oz)       Physical Exam:   Vitals and nursing note reviewed.   Constitutional:       Appearance: Normal and healthy appearance. Well-developed and not in distress.   Eyes:      Extraocular Movements: Extraocular movements intact.      Pupils: Pupils are equal, round, and reactive to light.   HENT:      Head:  Normocephalic and atraumatic.    Mouth/Throat:      Pharynx: Oropharynx is clear.   Neck:      Vascular: JVD normal.      Trachea: Trachea normal.   Pulmonary:      Effort: Pulmonary effort is normal.      Breath sounds: Normal breath sounds. No wheezing. No rhonchi. No rales.   Cardiovascular:      PMI at left midclavicular line. Bradycardia present. Irregular rhythm. Normal S1. Normal S2.      Murmurs: There is a grade 2/6 systolic murmur.      No gallop. No click. No rub.   Pulses:     Dorsalis pedis: 2+ bilaterally.     Posterior tibial: 2+ bilaterally.  Abdominal:      General: Bowel sounds are normal.      Palpations: Abdomen is soft.      Tenderness: There is no abdominal tenderness.   Musculoskeletal: Normal range of motion.      Cervical back: Normal range of motion and neck supple. Skin:     General: Skin is warm and dry.      Capillary Refill: Capillary refill takes less than 2 seconds.   Feet:      Right foot:      Skin integrity: Skin integrity normal.      Left foot:      Skin integrity: Skin integrity normal.   Neurological:      Mental Status: Alert and oriented to person, place and time.      Cranial Nerves: Cranial nerves are intact.      Sensory: Sensation is intact.      Motor: Motor function is intact.      Coordination: Coordination is intact.   Psychiatric:         Speech: Speech normal.         Behavior: Behavior is cooperative.         Medication Review: yes  Current Facility-Administered Medications   Medication Dose Route Frequency Provider Last Rate Last Admin   • acetaminophen (TYLENOL) tablet 650 mg  650 mg Oral Q4H PRN Andrea Blood DO   650 mg at 11/09/21 0303    Or   • acetaminophen (TYLENOL) suppository 650 mg  650 mg Rectal Q4H PRN Andrea Blood DO       • aluminum-magnesium hydroxide-simethicone (MAALOX MAX) 400-400-40 MG/5ML suspension 15 mL  15 mL Oral Q6H PRN Balbir Cunningham MD   15 mL at 11/09/21 0541   • atorvastatin (LIPITOR) tablet 10 mg  10 mg Oral Nightly  Andrea Blood DO   10 mg at 11/08/21 2002   • famotidine (PEPCID) injection 20 mg  20 mg Intravenous Q12H Job Brunson MD   20 mg at 11/09/21 0909   • folic acid (FOLVITE) tablet 1 mg  1 mg Oral Once per day on Mon Wed Fri Andrea Blood DO   1 mg at 11/08/21 0906   • lisinopril (PRINIVIL,ZESTRIL) tablet 10 mg  10 mg Oral Q24H Andrea Blood DO   10 mg at 11/08/21 0906   • ondansetron (ZOFRAN) injection 4 mg  4 mg Intravenous Q6H PRN Andrea Blood DO   4 mg at 11/07/21 1958   • sodium chloride 0.9 % flush 10 mL  10 mL Intravenous PRN Javier Oneill MD       • sodium chloride 0.9 % flush 10 mL  10 mL Intravenous Q12H Andrea Blood DO   10 mL at 11/09/21 0909   • sodium chloride 0.9 % flush 10 mL  10 mL Intravenous PRN Andrea Blood DO       • vitamin B-12 (CYANOCOBALAMIN) tablet 1,000 mcg  1,000 mcg Oral Daily Andrea Blood DO   1,000 mcg at 11/08/21 0906         Assessment/Plan       Bradycardia    Atrial fibrillation (HCC)    Hypertension    Hyperlipidemia    Dysphagia    Plan:    Patient's rhythm remains atrial fibrillation with slow ventricular rate.  No need for pacemaker at this time.  Patient is okay to proceed with EGD with gastroenterology today.  Recommend avoiding any AV mick blocking agents during the procedure.    Cardiology will continue to follow along.  Please call with any questions.      Juan Francisco Drew DO  Interventional cardiology  [facility]   11/09/21  09:12 CST

## 2021-11-09 NOTE — INTERVAL H&P NOTE
H&P reviewed. The patient was examined and there are no changes to the H&P.        The risks, benefits, and alternatives of the proposed course of action are reviewed in detail and an opportunity for questions is provided.  All questions are answered to apparent satisfaction.  Desire to proceed as outlined is expressed.        Thank you for allowing us to participate in the care of this patient.    Sincerely,    VERENICE Perez MD, [facility]  [facility]

## 2021-11-09 NOTE — PLAN OF CARE
Goal Outcome Evaluation:      Patient continues to complain of epigastric pain that is burning and sharp but does not radiate.Medicated with scheduled Pepcid IV. PRN Maalox given x 2 but patient reports does not help as much as the Pepcid does. PRN Tylenol given x 2 per patient request. Patient requested and given milk. Patient reports it does help. Swallows pills one at a time but reports she can feel them go down and catch. HR A-Fib with slow ventricular rate 40's- 50's. DBP 40-50's. On NC at 1 liter with sleep. On RA this AM. Voiding per BSC. Safety maintained. Will continue to monitor.

## 2021-11-10 ENCOUNTER — APPOINTMENT (OUTPATIENT)
Dept: CARDIOLOGY | Facility: HOSPITAL | Age: 86
End: 2021-11-10

## 2021-11-10 ENCOUNTER — APPOINTMENT (OUTPATIENT)
Dept: GENERAL RADIOLOGY | Facility: HOSPITAL | Age: 86
End: 2021-11-10

## 2021-11-10 LAB
ALBUMIN SERPL-MCNC: 3.7 G/DL (ref 3.5–5.2)
ALBUMIN/GLOB SERPL: 2.1 G/DL
ALP SERPL-CCNC: 49 U/L (ref 39–117)
ALT SERPL W P-5'-P-CCNC: 8 U/L (ref 1–33)
ANION GAP SERPL CALCULATED.3IONS-SCNC: 12 MMOL/L (ref 5–15)
ANION GAP SERPL CALCULATED.3IONS-SCNC: 9 MMOL/L (ref 5–15)
AST SERPL-CCNC: 16 U/L (ref 1–32)
BH CV ECHO MEAS - AO MAX PG (FULL): 5.1 MMHG
BH CV ECHO MEAS - AO MAX PG: 15.7 MMHG
BH CV ECHO MEAS - AO MEAN PG (FULL): 3 MMHG
BH CV ECHO MEAS - AO MEAN PG: 6 MMHG
BH CV ECHO MEAS - AO ROOT AREA (BSA CORRECTED): 2
BH CV ECHO MEAS - AO ROOT AREA: 9.1 CM^2
BH CV ECHO MEAS - AO ROOT DIAM: 3.4 CM
BH CV ECHO MEAS - AO V2 MAX: 198 CM/SEC
BH CV ECHO MEAS - AO V2 MEAN: 112 CM/SEC
BH CV ECHO MEAS - AO V2 VTI: 37.2 CM
BH CV ECHO MEAS - AVA(I,A): 2.3 CM^2
BH CV ECHO MEAS - AVA(I,D): 2.3 CM^2
BH CV ECHO MEAS - AVA(V,A): 2.6 CM^2
BH CV ECHO MEAS - AVA(V,D): 2.6 CM^2
BH CV ECHO MEAS - BSA(HAYCOCK): 1.7 M^2
BH CV ECHO MEAS - BSA: 1.7 M^2
BH CV ECHO MEAS - BZI_BMI: 22.3 KILOGRAMS/M^2
BH CV ECHO MEAS - BZI_METRIC_HEIGHT: 167.6 CM
BH CV ECHO MEAS - BZI_METRIC_WEIGHT: 62.6 KG
BH CV ECHO MEAS - EDV(CUBED): 62.6 ML
BH CV ECHO MEAS - EDV(MOD-SP4): 49.5 ML
BH CV ECHO MEAS - EDV(TEICH): 68.8 ML
BH CV ECHO MEAS - EF(CUBED): 71.9 %
BH CV ECHO MEAS - EF(MOD-SP4): 68.7 %
BH CV ECHO MEAS - EF(TEICH): 64.2 %
BH CV ECHO MEAS - ESV(CUBED): 17.6 ML
BH CV ECHO MEAS - ESV(MOD-SP4): 15.5 ML
BH CV ECHO MEAS - ESV(TEICH): 24.6 ML
BH CV ECHO MEAS - FS: 34.5 %
BH CV ECHO MEAS - IVS/LVPW: 0.88
BH CV ECHO MEAS - IVSD: 0.94 CM
BH CV ECHO MEAS - LA/AO: 1.2
BH CV ECHO MEAS - LAT PEAK E' VEL: 11.2 CM/SEC
BH CV ECHO MEAS - LV DIASTOLIC VOL/BSA (35-75): 29 ML/M^2
BH CV ECHO MEAS - LV MASS(C)D: 126.3 GRAMS
BH CV ECHO MEAS - LV MASS(C)DI: 73.9 GRAMS/M^2
BH CV ECHO MEAS - LV MAX PG: 10.6 MMHG
BH CV ECHO MEAS - LV MEAN PG: 3 MMHG
BH CV ECHO MEAS - LV SYSTOLIC VOL/BSA (12-30): 9.1 ML/M^2
BH CV ECHO MEAS - LV V1 MAX: 163 CM/SEC
BH CV ECHO MEAS - LV V1 MEAN: 77.1 CM/SEC
BH CV ECHO MEAS - LV V1 VTI: 27.8 CM
BH CV ECHO MEAS - LVIDD: 4 CM
BH CV ECHO MEAS - LVIDS: 2.6 CM
BH CV ECHO MEAS - LVLD AP4: 6 CM
BH CV ECHO MEAS - LVLS AP4: 5.3 CM
BH CV ECHO MEAS - LVOT AREA (M): 3.1 CM^2
BH CV ECHO MEAS - LVOT AREA: 3.1 CM^2
BH CV ECHO MEAS - LVOT DIAM: 2 CM
BH CV ECHO MEAS - LVPWD: 1.1 CM
BH CV ECHO MEAS - MED PEAK E' VEL: 7.2 CM/SEC
BH CV ECHO MEAS - MR MAX PG: 113.2 MMHG
BH CV ECHO MEAS - MR MAX VEL: 532 CM/SEC
BH CV ECHO MEAS - MR MEAN PG: 70 MMHG
BH CV ECHO MEAS - MR MEAN VEL: 391 CM/SEC
BH CV ECHO MEAS - MR VTI: 161 CM
BH CV ECHO MEAS - MV DEC TIME: 0.2 SEC
BH CV ECHO MEAS - MV E MAX VEL: 154 CM/SEC
BH CV ECHO MEAS - PA MAX PG: 5.5 MMHG
BH CV ECHO MEAS - PA V2 MAX: 117 CM/SEC
BH CV ECHO MEAS - RAP SYSTOLE: 5 MMHG
BH CV ECHO MEAS - RVSP: 50.7 MMHG
BH CV ECHO MEAS - SI(AO): 197.7 ML/M^2
BH CV ECHO MEAS - SI(CUBED): 26.3 ML/M^2
BH CV ECHO MEAS - SI(LVOT): 51.1 ML/M^2
BH CV ECHO MEAS - SI(MOD-SP4): 19.9 ML/M^2
BH CV ECHO MEAS - SI(TEICH): 25.8 ML/M^2
BH CV ECHO MEAS - SV(AO): 337.7 ML
BH CV ECHO MEAS - SV(CUBED): 45 ML
BH CV ECHO MEAS - SV(LVOT): 87.3 ML
BH CV ECHO MEAS - SV(MOD-SP4): 34 ML
BH CV ECHO MEAS - SV(TEICH): 44.2 ML
BH CV ECHO MEAS - TR MAX VEL: 338 CM/SEC
BH CV ECHO MEASUREMENTS AVERAGE E/E' RATIO: 16.74
BILIRUB SERPL-MCNC: 1.6 MG/DL (ref 0–1.2)
BUN SERPL-MCNC: 14 MG/DL (ref 8–23)
BUN SERPL-MCNC: 16 MG/DL (ref 8–23)
BUN/CREAT SERPL: 26.9 (ref 7–25)
BUN/CREAT SERPL: 28.1 (ref 7–25)
CALCIUM SPEC-SCNC: 8.7 MG/DL (ref 8.6–10.5)
CALCIUM SPEC-SCNC: 9.3 MG/DL (ref 8.6–10.5)
CHLORIDE SERPL-SCNC: 99 MMOL/L (ref 98–107)
CHLORIDE SERPL-SCNC: 99 MMOL/L (ref 98–107)
CO2 SERPL-SCNC: 25 MMOL/L (ref 22–29)
CO2 SERPL-SCNC: 26 MMOL/L (ref 22–29)
CREAT SERPL-MCNC: 0.52 MG/DL (ref 0.57–1)
CREAT SERPL-MCNC: 0.57 MG/DL (ref 0.57–1)
DEPRECATED RDW RBC AUTO: 43.7 FL (ref 37–54)
DEPRECATED RDW RBC AUTO: 44.1 FL (ref 37–54)
ERYTHROCYTE [DISTWIDTH] IN BLOOD BY AUTOMATED COUNT: 12.9 % (ref 12.3–15.4)
ERYTHROCYTE [DISTWIDTH] IN BLOOD BY AUTOMATED COUNT: 13 % (ref 12.3–15.4)
GFR SERPL CREATININE-BSD FRML MDRD: 100 ML/MIN/1.73
GFR SERPL CREATININE-BSD FRML MDRD: 111 ML/MIN/1.73
GLOBULIN UR ELPH-MCNC: 1.8 GM/DL
GLUCOSE SERPL-MCNC: 105 MG/DL (ref 65–99)
GLUCOSE SERPL-MCNC: 119 MG/DL (ref 65–99)
HCT VFR BLD AUTO: 34.9 % (ref 34–46.6)
HCT VFR BLD AUTO: 39.3 % (ref 34–46.6)
HGB BLD-MCNC: 11.6 G/DL (ref 12–15.9)
HGB BLD-MCNC: 12.8 G/DL (ref 12–15.9)
INR PPP: 1.26 (ref 0.91–1.09)
LAB AP CASE REPORT: NORMAL
LAB AP SPECIAL STAINS: NORMAL
LEFT ATRIUM VOLUME INDEX: 49 ML/M2
LEFT ATRIUM VOLUME: 84 CM3
MAXIMAL PREDICTED HEART RATE: 131 BPM
MCH RBC QN AUTO: 30.5 PG (ref 26.6–33)
MCH RBC QN AUTO: 30.8 PG (ref 26.6–33)
MCHC RBC AUTO-ENTMCNC: 32.6 G/DL (ref 31.5–35.7)
MCHC RBC AUTO-ENTMCNC: 33.2 G/DL (ref 31.5–35.7)
MCV RBC AUTO: 92.6 FL (ref 79–97)
MCV RBC AUTO: 93.6 FL (ref 79–97)
PATH REPORT.FINAL DX SPEC: NORMAL
PATH REPORT.GROSS SPEC: NORMAL
PLATELET # BLD AUTO: 150 10*3/MM3 (ref 140–450)
PLATELET # BLD AUTO: 185 10*3/MM3 (ref 140–450)
PMV BLD AUTO: 10.6 FL (ref 6–12)
PMV BLD AUTO: 10.8 FL (ref 6–12)
POTASSIUM SERPL-SCNC: 4.3 MMOL/L (ref 3.5–5.2)
POTASSIUM SERPL-SCNC: 4.3 MMOL/L (ref 3.5–5.2)
PROT SERPL-MCNC: 5.5 G/DL (ref 6–8.5)
PROTHROMBIN TIME: 15.3 SECONDS (ref 11.9–14.6)
RBC # BLD AUTO: 3.77 10*6/MM3 (ref 3.77–5.28)
RBC # BLD AUTO: 4.2 10*6/MM3 (ref 3.77–5.28)
SODIUM SERPL-SCNC: 134 MMOL/L (ref 136–145)
SODIUM SERPL-SCNC: 136 MMOL/L (ref 136–145)
STRESS TARGET HR: 111 BPM
UREASE TISS QL: NEGATIVE
WBC # BLD AUTO: 8.3 10*3/MM3 (ref 3.4–10.8)
WBC # BLD AUTO: 8.68 10*3/MM3 (ref 3.4–10.8)

## 2021-11-10 PROCEDURE — C1786 PMKR, SINGLE, RATE-RESP: HCPCS | Performed by: INTERNAL MEDICINE

## 2021-11-10 PROCEDURE — 99233 SBSQ HOSP IP/OBS HIGH 50: CPT | Performed by: INTERNAL MEDICINE

## 2021-11-10 PROCEDURE — 0JH604Z INSERTION OF PACEMAKER, SINGLE CHAMBER INTO CHEST SUBCUTANEOUS TISSUE AND FASCIA, OPEN APPROACH: ICD-10-PCS | Performed by: INTERNAL MEDICINE

## 2021-11-10 PROCEDURE — 33207 INSERT HEART PM VENTRICULAR: CPT | Performed by: INTERNAL MEDICINE

## 2021-11-10 PROCEDURE — 25010000002 MIDAZOLAM HCL (PF) 5 MG/5ML SOLUTION: Performed by: INTERNAL MEDICINE

## 2021-11-10 PROCEDURE — 0 IOPAMIDOL PER 1 ML: Performed by: INTERNAL MEDICINE

## 2021-11-10 PROCEDURE — 99153 MOD SED SAME PHYS/QHP EA: CPT | Performed by: INTERNAL MEDICINE

## 2021-11-10 PROCEDURE — 85027 COMPLETE CBC AUTOMATED: CPT | Performed by: INTERNAL MEDICINE

## 2021-11-10 PROCEDURE — C1892 INTRO/SHEATH,FIXED,PEEL-AWAY: HCPCS | Performed by: INTERNAL MEDICINE

## 2021-11-10 PROCEDURE — 99152 MOD SED SAME PHYS/QHP 5/>YRS: CPT | Performed by: INTERNAL MEDICINE

## 2021-11-10 PROCEDURE — 93306 TTE W/DOPPLER COMPLETE: CPT | Performed by: INTERNAL MEDICINE

## 2021-11-10 PROCEDURE — 71045 X-RAY EXAM CHEST 1 VIEW: CPT

## 2021-11-10 PROCEDURE — 25010000002 VANCOMYCIN 10 G RECONSTITUTED SOLUTION: Performed by: INTERNAL MEDICINE

## 2021-11-10 PROCEDURE — 80053 COMPREHEN METABOLIC PANEL: CPT | Performed by: INTERNAL MEDICINE

## 2021-11-10 PROCEDURE — 25010000002 FENTANYL CITRATE (PF) 50 MCG/ML SOLUTION: Performed by: INTERNAL MEDICINE

## 2021-11-10 PROCEDURE — C1898 LEAD, PMKR, OTHER THAN TRANS: HCPCS | Performed by: INTERNAL MEDICINE

## 2021-11-10 PROCEDURE — 25010000002 DIPHENHYDRAMINE PER 50 MG: Performed by: INTERNAL MEDICINE

## 2021-11-10 PROCEDURE — 85610 PROTHROMBIN TIME: CPT | Performed by: INTERNAL MEDICINE

## 2021-11-10 PROCEDURE — 93306 TTE W/DOPPLER COMPLETE: CPT

## 2021-11-10 PROCEDURE — 02HK3JZ INSERTION OF PACEMAKER LEAD INTO RIGHT VENTRICLE, PERCUTANEOUS APPROACH: ICD-10-PCS | Performed by: INTERNAL MEDICINE

## 2021-11-10 DEVICE — PACEMAKER
Type: IMPLANTABLE DEVICE | Status: FUNCTIONAL
Brand: ACCOLADE™ MRI SR

## 2021-11-10 DEVICE — PACE/SENSE LEAD
Type: IMPLANTABLE DEVICE | Status: FUNCTIONAL
Brand: INGEVITY™+

## 2021-11-10 RX ORDER — FENTANYL CITRATE 50 UG/ML
INJECTION, SOLUTION INTRAMUSCULAR; INTRAVENOUS AS NEEDED
Status: DISCONTINUED | OUTPATIENT
Start: 2021-11-10 | End: 2021-11-10 | Stop reason: HOSPADM

## 2021-11-10 RX ORDER — NEOMYCIN AND POLYMYXIN B SULFATES 40; 200000 MG/ML; [USP'U]/ML
SOLUTION IRRIGATION AS NEEDED
Status: DISCONTINUED | OUTPATIENT
Start: 2021-11-10 | End: 2021-11-10 | Stop reason: HOSPADM

## 2021-11-10 RX ORDER — DIPHENHYDRAMINE HYDROCHLORIDE 50 MG/ML
INJECTION INTRAMUSCULAR; INTRAVENOUS AS NEEDED
Status: DISCONTINUED | OUTPATIENT
Start: 2021-11-10 | End: 2021-11-10 | Stop reason: HOSPADM

## 2021-11-10 RX ORDER — SODIUM CHLORIDE 0.9 % (FLUSH) 0.9 %
3 SYRINGE (ML) INJECTION EVERY 12 HOURS SCHEDULED
Status: DISCONTINUED | OUTPATIENT
Start: 2021-11-10 | End: 2021-11-11 | Stop reason: HOSPADM

## 2021-11-10 RX ORDER — SODIUM CHLORIDE 0.9 % (FLUSH) 0.9 %
3-10 SYRINGE (ML) INJECTION AS NEEDED
Status: DISCONTINUED | OUTPATIENT
Start: 2021-11-10 | End: 2021-11-11 | Stop reason: HOSPADM

## 2021-11-10 RX ORDER — FLUTICASONE PROPIONATE 50 MCG
2 SPRAY, SUSPENSION (ML) NASAL DAILY PRN
Status: DISCONTINUED | OUTPATIENT
Start: 2021-11-10 | End: 2021-11-11 | Stop reason: HOSPADM

## 2021-11-10 RX ORDER — LIDOCAINE HYDROCHLORIDE 20 MG/ML
INJECTION, SOLUTION INFILTRATION; PERINEURAL AS NEEDED
Status: DISCONTINUED | OUTPATIENT
Start: 2021-11-10 | End: 2021-11-10 | Stop reason: HOSPADM

## 2021-11-10 RX ORDER — SODIUM CHLORIDE 9 MG/ML
60 INJECTION, SOLUTION INTRAVENOUS CONTINUOUS
Status: DISCONTINUED | OUTPATIENT
Start: 2021-11-10 | End: 2021-11-11 | Stop reason: HOSPADM

## 2021-11-10 RX ORDER — MIDAZOLAM HYDROCHLORIDE 1 MG/ML
INJECTION, SOLUTION INTRAMUSCULAR; INTRAVENOUS AS NEEDED
Status: DISCONTINUED | OUTPATIENT
Start: 2021-11-10 | End: 2021-11-10 | Stop reason: HOSPADM

## 2021-11-10 RX ADMIN — LISINOPRIL 10 MG: 10 TABLET ORAL at 08:44

## 2021-11-10 RX ADMIN — ACETAMINOPHEN 650 MG: 325 TABLET, FILM COATED ORAL at 23:08

## 2021-11-10 RX ADMIN — ACETAMINOPHEN 650 MG: 325 TABLET, FILM COATED ORAL at 16:44

## 2021-11-10 RX ADMIN — FAMOTIDINE 20 MG: 10 INJECTION INTRAVENOUS at 20:25

## 2021-11-10 RX ADMIN — SODIUM CHLORIDE 60 ML/HR: 9 INJECTION, SOLUTION INTRAVENOUS at 16:46

## 2021-11-10 RX ADMIN — FAMOTIDINE 20 MG: 10 INJECTION INTRAVENOUS at 08:44

## 2021-11-10 RX ADMIN — ATORVASTATIN CALCIUM 10 MG: 10 TABLET, FILM COATED ORAL at 20:25

## 2021-11-10 RX ADMIN — SODIUM CHLORIDE, PRESERVATIVE FREE 10 ML: 5 INJECTION INTRAVENOUS at 08:44

## 2021-11-10 RX ADMIN — Medication 1000 MCG: at 08:44

## 2021-11-10 NOTE — PLAN OF CARE
Goal Outcome Evaluation:  Plan of Care Reviewed With: patient        Progress: improving  Outcome Summary: VSS. Pt has no c/o pain. Pt has been bradycardic at times with a 2.06 sec pause, pt asymptomatic. Possible pacemaker placement? Remains on room air. No falls. Safety maintained. Af 51 -83

## 2021-11-10 NOTE — PROGRESS NOTES
LOS: 3 days   Patient Care Team:  Elia Preston MD as PCP - General (Family Medicine)  Elia Preston MD as Consulting Physician (Family Medicine)    Chief Complaint:      Subjective    Yudi Westbrook is a 89 y.o. female who is being seen in follow-up.  Overnight feels tired  Anxious  Concerned about low heart rates  No chest pain  No palpitation  No presyncope  No syncope  No orthopnea  No paroxysmal nocturnal dyspnea  Hemodynamically stable  Labs reviewed  No new issues or events  Tolerating current medications well  Satisfactory bowel and bladder activity  Satisfactory oral intake  Resting well     Telemetry: Atrial fibrillation with intermittent pauses close to 3 seconds usually at night  Low heart rates from time to time down into the 30+ beat per minute range  While awake heart rates in the 40 bpm range    Review of Systems   Constitutional: No chills   Has fatigue   No fever.   HENT: Negative.    Eyes: Negative.    Respiratory: Negative for cough,   No chest wall soreness,   Shortness of breath,   no wheezing, no stridor.    Cardiovascular: As above  Gastrointestinal: Negative for abdominal distention,  No abdominal pain,   No blood in stool,   No constipation,   No diarrhea,   No nausea   No vomiting.   Endocrine: Negative.    Genitourinary: Negative for difficulty urinating, dysuria, flank pain and hematuria.   Musculoskeletal: Negative.    Skin: Negative for rash and wound.   Allergic/Immunologic: Negative.    Neurological: Negative for dizziness, syncope, weakness,   No light-headedness  No  headaches.   Hematological: Does not bruise/bleed easily.   Psychiatric/Behavioral: Negative for agitation or behavioral problems,   No confusion,   the patient is  nervous/anxious.       History:   Past Medical History:   Diagnosis Date   • Atrial fibrillation (HCC)    • Bradycardia    • Cancer (HCC) 2002    Breast   • Hyperlipidemia    • Hypertension    • Osteoporosis      Past Surgical History:    Procedure Laterality Date   • APPENDECTOMY     • ENDOSCOPY N/A 11/9/2021    Procedure: ESOPHAGOGASTRODUODENOSCOPY WITH ANESTHESIA;  Surgeon: Juanito Perez MD;  [facility]: [facility] ENDOSCOPY;  Service: Gastroenterology;  Laterality: N/A;  pre dysphagia  post  Elia Preston MD   • HYSTERECTOMY     • MASTECTOMY Bilateral      Social History     Socioeconomic History   • Marital status:    Tobacco Use   • Smoking status: Never Smoker   • Smokeless tobacco: Never Used   Substance and Sexual Activity   • Alcohol use: No   • Drug use: No   • Sexual activity: Defer     Family History   Problem Relation Age of Onset   • Hypertension Father    • Heart attack Brother    • Cancer Brother        Labs:  WBC WBC   Date Value Ref Range Status   11/10/2021 8.30 3.40 - 10.80 10*3/mm3 Final   11/09/2021 8.62 3.40 - 10.80 10*3/mm3 Final   11/08/2021 8.29 3.40 - 10.80 10*3/mm3 Final   11/07/2021 5.97 3.40 - 10.80 10*3/mm3 Final      HGB Hemoglobin   Date Value Ref Range Status   11/10/2021 11.6 (L) 12.0 - 15.9 g/dL Final   11/09/2021 12.2 12.0 - 15.9 g/dL Final   11/08/2021 13.2 12.0 - 15.9 g/dL Final   11/07/2021 13.5 12.0 - 15.9 g/dL Final      HCT Hematocrit   Date Value Ref Range Status   11/10/2021 34.9 34.0 - 46.6 % Final   11/09/2021 37.6 34.0 - 46.6 % Final   11/08/2021 40.2 34.0 - 46.6 % Final   11/07/2021 40.1 34.0 - 46.6 % Final      Platelets Platelets   Date Value Ref Range Status   11/10/2021 150 140 - 450 10*3/mm3 Final   11/09/2021 183 140 - 450 10*3/mm3 Final   11/08/2021 194 140 - 450 10*3/mm3 Final   11/07/2021 198 140 - 450 10*3/mm3 Final      MCV MCV   Date Value Ref Range Status   11/10/2021 92.6 79.0 - 97.0 fL Final   11/09/2021 92.4 79.0 - 97.0 fL Final   11/08/2021 94.1 79.0 - 97.0 fL Final   11/07/2021 93.3 79.0 - 97.0 fL Final        Results from last 7 days   Lab Units 11/10/21  0605 11/09/21  0401 11/08/21  0918   SODIUM mmol/L 134* 135* 138   POTASSIUM mmol/L 4.3 4.2 4.4   CHLORIDE mmol/L  99 100 103   CO2 mmol/L 26.0 25.0 27.0   BUN mg/dL 14 20 15   CREATININE mg/dL 0.52* 0.65 0.63   CALCIUM mg/dL 8.7 9.4 9.6   BILIRUBIN mg/dL 1.6* 1.4* 1.2   ALK PHOS U/L 49 50 57   ALT (SGPT) U/L 8 13 11   AST (SGOT) U/L 16 15 22   GLUCOSE mg/dL 105* 147* 111*     Lab Results   Component Value Date    TROPONINT <0.010 11/07/2021     PT/INR:    Protime   Date Value Ref Range Status   11/09/2021 16.9 (H) 11.9 - 14.6 Seconds Final   11/08/2021 22.0 (H) 11.9 - 14.6 Seconds Final   11/07/2021 23.4 (H) 11.9 - 14.6 Seconds Final   /  INR   Date Value Ref Range Status   11/09/2021 1.43 (H) 0.91 - 1.09 Final   11/08/2021 2.01 (H) 0.91 - 1.09 Final   11/07/2021 2.18 (H) 0.91 - 1.09 Final       Imaging Results (Last 72 Hours)     Procedure Component Value Units Date/Time    CT Angiogram Chest [[id number]] Collected: 11/07/21 1056     Updated: 11/07/21 1109    Narrative:      Exam: CT angiogram of the of the chest with intravenous contrast.     CLINICAL HISTORY:  Aortic disease. Nontraumatic.     DOSE:  79 mGycm. Automated exposure control was utilized to diminish  patient radiation dose.     TECHNIQUE: Contiguous axial images were obtained through the thorax  following intravenous contrast administration with reformatted images  obtained in the sagittal and coronal projections from the original data  set. Three-dimensional reconstructions are also obtained.     COMPARISON:  None available     FINDINGS:     Pulmonary arteries:  There is normal enhancement of the pulmonary  arteries with no evidence of pulmonary thromboembolic disease. There is  enlargement of the pulmonary arteries suggesting pulmonary arterial  hypertension.     Aorta:  The thoracic aorta is ectatic without evidence of aneurysm or  dissection. There is atheromatous calcification of the aortic arch and  descending thoracic aorta. The proximal great vessels are remarkable for  mild calcific plaquing at the origin of the innominate, left subclavian  and left  common carotid artery without rate limiting stenosis. The left  vertebral artery emanates directly from the aortic arch. There is  significant disease at the origin of the left vertebral artery with at  least moderate stenosis.     LUNGS:  There is scarring within the lung apices. Bibasilar atelectasis  as well as left lingular atelectasis is present. There is no evidence of  consolidative pneumonia or effusion. No evidence of nodularity or mass.     Pleural spaces: Unremarkable. No evidence of pleural effusion or  pneumothorax.     HEART: There is moderate cardiomegaly. There is elevated right heart  pressure with enlargement of the right atrium and right ventricle and  reflux of contrast into the intrahepatic IVC which is dilated. There is  no evidence of pericardial effusion. Mild coronary calcifications are  present.     Bones: No acute osseous abnormalities are demonstrated. There is a  moderate wedge compression deformity involving the superior endplate of  L1 of uncertain acuity this was documented on plain film radiographs  dated back to the summer 2020.     CHEST WALL: No chest wall abnormalities are demonstrated.     Lymph nodes: No enlarged mediastinal or axillary lymphadenopathy is  present.     Upper abdomen: There is a moderate to large size hiatal hernia. The  adrenals are unremarkable.       Impression:      1. No evidence of pulmonary thromboembolic disease. There is enlargement  of the pulmonary arteries suggesting pulmonary arterial hypertension.  2. Moderate cardiomegaly. Coronary calcifications are present. There is  both left and right chamber enlargement present. There is elevated right  heart pressure with reflux of contrast into the intrahepatic IVC which  is dilated.  3. The thoracic aorta is ectatic but without evidence of dissection or  aneurysm. There is mild calcific plaquing at the origin of the great  vessels without evidence of associated stenosis within the innominate,  left common  carotid and left subclavian artery. The left vertebral  artery emanates directly from the aortic arch and there is more  significant stenosis at its origin with at least moderate  cross-sectional narrowing. The left vertebral artery does otherwise  demonstrate normal enhancement proximally.  4. Bilateral lower lobe and left lingular atelectasis. No evidence of  consolidative pneumonia or effusion.  This report was finalized on 11/07/2021 11:06 by Dr. Joseph Santos MD.    XR Chest 1 View [[id number]] Collected: 11/07/21 0817     Updated: 11/07/21 0821    Narrative:      EXAMINATION: Chest 1 view 11/7/2021     HISTORY: Chest pain     FINDINGS: Today's exam is compared to previous study of 7/24/2020. There  is mild cardiomegaly. Lungs are mildly hyperinflated with increased  interstitial markings which are chronic in nature. There is no evidence  of acute consolidative pneumonia or effusion. There is no free air  beneath the hemidiaphragms.       Impression:      1.. Chronic changes of the lungs. There is mild cardiomegaly.  2. No evidence of lobar pneumonia or effusion.  This report was finalized on 11/07/2021 08:18 by Dr. Joseph Santos MD.          Objective     Allergies   Allergen Reactions   • Morphine Unknown - Low Severity     Has not taken. But family members cannot take.   • Penicillins Rash       Medication Review: Performed  Current Facility-Administered Medications   Medication Dose Route Frequency Provider Last Rate Last Admin   • acetaminophen (TYLENOL) tablet 650 mg  650 mg Oral Q4H PRN Juanito Perez MD   650 mg at 11/09/21 1033    Or   • acetaminophen (TYLENOL) suppository 650 mg  650 mg Rectal Q4H PRN Juanito Perez MD       • aluminum-magnesium hydroxide-simethicone (MAALOX MAX) 400-400-40 MG/5ML suspension 15 mL  15 mL Oral Q6H PRN Juanito Perez MD   15 mL at 11/09/21 0541   • atorvastatin (LIPITOR) tablet 10 mg  10 mg Oral Nightly Juanito Perez MD   10 mg at 11/09/21 2022  "  • famotidine (PEPCID) injection 20 mg  20 mg Intravenous Q12H Juanito Perez MD   20 mg at 11/09/21 2022   • lisinopril (PRINIVIL,ZESTRIL) tablet 10 mg  10 mg Oral Q24H Juanito Perez MD   10 mg at 11/08/21 0906   • ondansetron (ZOFRAN) injection 4 mg  4 mg Intravenous Q6H PRN Juanito Perez MD   4 mg at 11/07/21 1958   • sodium chloride 0.9 % flush 10 mL  10 mL Intravenous PRN Juanito Perez MD       • sodium chloride 0.9 % flush 10 mL  10 mL Intravenous Q12H Juanito Perez MD   10 mL at 11/09/21 2022   • vitamin B-12 (CYANOCOBALAMIN) tablet 1,000 mcg  1,000 mcg Oral Daily Juanito Perez MD   1,000 mcg at 11/08/21 0906   • [START ON 11/13/2021] warfarin (COUMADIN) tablet 5 mg  5 mg Oral Weekly Balbir Cunningham MD       • warfarin (COUMADIN) tablet 7.5 mg  7.5 mg Oral Once per day on Sun Mon Tue Wed Thu Fri Balbir Cunningham MD   7.5 mg at 11/09/21 1943       Vital Sign Min/Max for last 24 hours  Temp  Min: 98 °F (36.7 °C)  Max: 100.5 °F (38.1 °C)   BP  Min: 92/62  Max: 143/54   Pulse  Min: 46  Max: 75   Resp  Min: 16  Max: 20   SpO2  Min: 91 %  Max: 95 %   No data recorded   Weight  Min: 62.9 kg (138 lb 11.2 oz)  Max: 62.9 kg (138 lb 11.2 oz)     Flowsheet Rows      First Filed Value   Admission Height 167.6 cm (66\") Documented at 11/07/2021 0736   Admission Weight 62.6 kg (138 lb) Documented at 11/07/2021 0736              Physical Exam:    General Appearance: Awake, alert, in no acute distress  Eyes: Pupils equal and reactive    Ears: Appear intact with no abnormalities noted  Nose: Nares normal, no drainage  Neck: supple, trachea midline, no carotid bruit and no JVD  Back: no kyphosis present,    Lungs: respirations regular, respirations even and respirations unlabored  Heart: normal S1, S2, irregular, 2/6 systolic murmur left sternal border  No rubs  Abdomen: normal bowel sounds, no tenderness   Skin: no bleeding, bruising or rash  Extremities: no cyanosis  Psychiatric/Behavioral: " Negative for agitation, behavioral problems, confusion, the patient does  appear to be nervous/anxious.       Results Review:   I reviewed the patient's new clinical results.  I reviewed the patient's new imaging results and agree with the interpretation.  I reviewed the patient's other test results and agree with the interpretation  I personally viewed and interpreted the patient's EKG/Telemetry data    Discussed with patient  Updated patient regarding any new or relevant abnormalities on review of records or any new findings on physical exam.   Mentioned to patient about purpose of visit and desirable health short and long term goals and objectives.     Reviewed available prior notes, consults, prior visits, laboratory findings, radiology and cardiology relevant reports.   Updated chart as applicable.   I have reviewed the patient's medical history in detail and updated the computerized patient record as relevant.          Assessment/Plan       Bradycardia    Atrial fibrillation (HCC)    Hypertension    Hyperlipidemia    Dysphagia      Plan    Recheck INR  Coumadin was on hold and for some reason Coumadin was resumed last night  Results of other testing reviewed  Possible pacemaker implantation today if INR less than 2  Risk benefits alternatives discussed with her as well as with nursing  Keep n.p.o. for now  Telemetry  Deep vein thrombosis prophylaxis/precautions  Appropriate diet, fluid, sodium, caffeine, stimulants intake   Questions were encouraged, asked and answered to the patient's  understanding and satisfaction.  Compliance to diet and medications       Charles Nunez MD  11/10/21  07:42 CST    EMR Dragon/Transcription was used to dictate part of this note

## 2021-11-10 NOTE — PROGRESS NOTES
Daily Progress Note  Yudi Westbrook  MRN: [id number] LOS: 3    Admit Date: 11/7/2021   11/10/2021 08:22 CST    Subjective:          Chief Complaint:  Chief Complaint   Patient presents with   • Chest Pain       Interval History:    Reviewed overnight events and nursing notes.   Pain improved with Pepcid, little change with GI cocktail.  EGD yesterday showed tortuous esophagus, esophageal ring that is patent, medium size hiatal hernia.  No overt peptic ulcer disease mentioned.  Cardiology initially decided against pacemaker placement, but now will proceed later today as her INR is 1.26.    Review of Systems   Constitutional: Positive for activity change and appetite change. Negative for fever.   Respiratory: Negative for cough and shortness of breath.    Cardiovascular: Positive for chest pain. Negative for palpitations and leg swelling.   Gastrointestinal: Positive for abdominal pain and nausea. Negative for blood in stool, constipation, diarrhea and vomiting.   Skin: Negative for color change and pallor.   Neurological: Negative for dizziness and headaches.   Psychiatric/Behavioral: Negative for agitation.       DIET:  NPO Diet    Medications:      atorvastatin, 10 mg, Oral, Nightly  famotidine, 20 mg, Intravenous, Q12H  lisinopril, 10 mg, Oral, Q24H  sodium chloride, 10 mL, Intravenous, Q12H  vitamin B-12, 1,000 mcg, Oral, Daily        Data:     Code Status:   Code Status and Medical Interventions:   Ordered at: 11/07/21 5633     Level Of Support Discussed With:    Patient     Code Status (Patient has no pulse and is not breathing):    CPR (Attempt to Resuscitate)     Medical Interventions (Patient has pulse or is breathing):    Full Support       Family History   Problem Relation Age of Onset   • Hypertension Father    • Heart attack Brother    • Cancer Brother      Social History     Socioeconomic History   • Marital status:    Tobacco Use   • Smoking status: Never Smoker   • Smokeless tobacco: Never  Used   Substance and Sexual Activity   • Alcohol use: No   • Drug use: No   • Sexual activity: Defer       Labs:    Lab Results (last 72 hours)     Procedure Component Value Units Date/Time    Protime-INR [[id number]]  (Abnormal) Collected: 11/10/21 0728    Specimen: Blood Updated: 11/10/21 0756     Protime 15.3 Seconds      INR 1.26    Comprehensive Metabolic Panel [[id number]]  (Abnormal) Collected: 11/10/21 0605    Specimen: Blood Updated: 11/10/21 0634     Glucose 105 mg/dL      BUN 14 mg/dL      Creatinine 0.52 mg/dL      Sodium 134 mmol/L      Potassium 4.3 mmol/L      Chloride 99 mmol/L      CO2 26.0 mmol/L      Calcium 8.7 mg/dL      Total Protein 5.5 g/dL      Albumin 3.70 g/dL      ALT (SGPT) 8 U/L      AST (SGOT) 16 U/L      Alkaline Phosphatase 49 U/L      Total Bilirubin 1.6 mg/dL      eGFR Non African Amer 111 mL/min/1.73      Globulin 1.8 gm/dL      A/G Ratio 2.1 g/dL      BUN/Creatinine Ratio 26.9     Anion Gap 9.0 mmol/L     Narrative:      GFR Normal >60  Chronic Kidney Disease <60  Kidney Failure <15      CBC (No Diff) [[id number]]  (Abnormal) Collected: 11/10/21 0605    Specimen: Blood Updated: 11/10/21 0618     WBC 8.30 10*3/mm3      RBC 3.77 10*6/mm3      Hemoglobin 11.6 g/dL      Hematocrit 34.9 %      MCV 92.6 fL      MCH 30.8 pg      MCHC 33.2 g/dL      RDW 12.9 %      RDW-SD 43.7 fl      MPV 10.8 fL      Platelets 150 10*3/mm3     [facility] For H Pylori - Tissue, Stomach [[id number]] Collected: 11/09/21 1342    Specimen: Tissue from Stomach Updated: 11/09/21 1414    Tissue Pathology Exam [[id number]] Collected: 11/09/21 1342    Specimen: Tissue from Stomach Updated: 11/09/21 1411    Comprehensive Metabolic Panel [[id number]]  (Abnormal) Collected: 11/09/21 0401    Specimen: Blood Updated: 11/09/21 0504     Glucose 147 mg/dL      BUN 20 mg/dL      Creatinine 0.65 mg/dL      Sodium 135 mmol/L      Potassium 4.2 mmol/L      Chloride 100 mmol/L      CO2 25.0 mmol/L      Calcium 9.4 mg/dL      Total  Protein 6.4 g/dL      Albumin 3.90 g/dL      ALT (SGPT) 13 U/L      AST (SGOT) 15 U/L      Alkaline Phosphatase 50 U/L      Total Bilirubin 1.4 mg/dL      eGFR Non African Amer 86 mL/min/1.73      Globulin 2.5 gm/dL      A/G Ratio 1.6 g/dL      BUN/Creatinine Ratio 30.8     Anion Gap 10.0 mmol/L     Narrative:      GFR Normal >60  Chronic Kidney Disease <60  Kidney Failure <15      Protime-INR [[id number]]  (Abnormal) Collected: 11/09/21 0401    Specimen: Blood Updated: 11/09/21 0450     Protime 16.9 Seconds      INR 1.43    CBC (No Diff) [[id number]]  (Normal) Collected: 11/09/21 0401    Specimen: Blood Updated: 11/09/21 0438     WBC 8.62 10*3/mm3      RBC 4.07 10*6/mm3      Hemoglobin 12.2 g/dL      Hematocrit 37.6 %      MCV 92.4 fL      MCH 30.0 pg      MCHC 32.4 g/dL      RDW 12.9 %      RDW-SD 43.1 fl      MPV 11.3 fL      Platelets 183 10*3/mm3     Comprehensive Metabolic Panel [[id number]]  (Abnormal) Collected: 11/08/21 0918    Specimen: Blood Updated: 11/08/21 1001     Glucose 111 mg/dL      BUN 15 mg/dL      Creatinine 0.63 mg/dL      Sodium 138 mmol/L      Potassium 4.4 mmol/L      Chloride 103 mmol/L      CO2 27.0 mmol/L      Calcium 9.6 mg/dL      Total Protein 6.9 g/dL      Albumin 4.50 g/dL      ALT (SGPT) 11 U/L      AST (SGOT) 22 U/L      Alkaline Phosphatase 57 U/L      Total Bilirubin 1.2 mg/dL      eGFR Non African Amer 89 mL/min/1.73      Globulin 2.4 gm/dL      A/G Ratio 1.9 g/dL      BUN/Creatinine Ratio 23.8     Anion Gap 8.0 mmol/L     Narrative:      GFR Normal >60  Chronic Kidney Disease <60  Kidney Failure <15      CBC (No Diff) [[id number]]  (Normal) Collected: 11/08/21 0918    Specimen: Blood Updated: 11/08/21 0942     WBC 8.29 10*3/mm3      RBC 4.27 10*6/mm3      Hemoglobin 13.2 g/dL      Hematocrit 40.2 %      MCV 94.1 fL      MCH 30.9 pg      MCHC 32.8 g/dL      RDW 13.1 %      RDW-SD 44.9 fl      MPV 11.0 fL      Platelets 194 10*3/mm3     Basic Metabolic Panel [[id number]]  (Abnormal)  Collected: 11/08/21 0352    Specimen: Blood Updated: 11/08/21 0433     Glucose 111 mg/dL      BUN 17 mg/dL      Creatinine 0.69 mg/dL      Sodium 140 mmol/L      Potassium 4.4 mmol/L      Chloride 104 mmol/L      CO2 28.0 mmol/L      Calcium 9.5 mg/dL      eGFR Non African Amer 80 mL/min/1.73      BUN/Creatinine Ratio 24.6     Anion Gap 8.0 mmol/L     Narrative:      GFR Normal >60  Chronic Kidney Disease <60  Kidney Failure <15      Magnesium [[id number]]  (Normal) Collected: 11/08/21 0352    Specimen: Blood Updated: 11/08/21 0433     Magnesium 2.0 mg/dL     Protime-INR [[id number]]  (Abnormal) Collected: 11/08/21 0352    Specimen: Blood Updated: 11/08/21 0422     Protime 22.0 Seconds      INR 2.01    Protime-INR [[id number]]  (Abnormal) Collected: 11/07/21 0832    Specimen: Blood from Arm, Right Updated: 11/07/21 1514     Protime 23.4 Seconds      INR 2.18    COVID PRE-OP / PRE-PROCEDURE SCREENING ORDER (NO ISOLATION) - Swab, Nasal Cavity [[id number]]  (Normal) Collected: 11/07/21 1326    Specimen: Swab from Nasal Cavity Updated: 11/07/21 1413    Narrative:      The following orders were created for panel order COVID PRE-OP / PRE-PROCEDURE SCREENING ORDER (NO ISOLATION) - Swab, Nasal Cavity.  Procedure                               Abnormality         Status                     ---------                               -----------         ------                     COVID-19,[facility] IN-NANDO...[[id number]]  Normal              Final result                 Please view results for these tests on the individual orders.    COVID-19,[facility] IN-HOUSE,Nasal Swab No Transport Media 3-4 HR TAT - Swab, Nasal Cavity [[id number]]  (Normal) Collected: 11/07/21 1326    Specimen: Swab from Nasal Cavity Updated: 11/07/21 1413     COVID19 Not Detected    Narrative:      Fact sheet for providers: https://www.fda.gov/media/900752/download     Fact sheet for patients: https://www.fda.gov/media/980642/download    Test performed by  PCR.    Consider negative results in combination with clinical observations, patient history, and epidemiological information.  Fact sheet for providers: https://www.fda.gov/media/422861/download     Fact sheet for patients: https://www.fda.gov/media/651205/download    Test performed by PCR.    Consider negative results in combination with clinical observations, patient history, and epidemiological information.    Troponin [[id number]]  (Normal) Collected: 11/07/21 1115    Specimen: Blood Updated: 11/07/21 1140     Troponin T <0.010 ng/mL     Narrative:      Troponin T Reference Range:  <= 0.03 ng/mL-   Negative for AMI  >0.03 ng/mL-     Abnormal for myocardial necrosis.  Clinicians would have to utilize clinical acumen, EKG, Troponin and serial changes to determine if it is an Acute Myocardial Infarction or myocardial injury due to an underlying chronic condition.       Results may be falsely decreased if patient taking Biotin.      [place] Draw [[id number]] Collected: 11/07/21 0832    Specimen: Blood from Arm, Right Updated: 11/07/21 0945    Narrative:      The following orders were created for panel order [place] Draw.  Procedure                               Abnormality         Status                     ---------                               -----------         ------                     Green Top (Gel)[[id number]]                                  Final result               Lavender Top[[id number]]                                     Final result               Red Top[[id number]]                                          Final result               Light Blue Top[[id number]]                                   Final result                 Please view results for these tests on the individual orders.    Green Top (Gel) [[id number]] Collected: 11/07/21 0832    Specimen: Blood from Arm, Right Updated: 11/07/21 0945     Extra Tube Hold for add-ons.     Comment: Auto resulted.       Light Blue Top [[id number]] Collected:  11/07/21 0832    Specimen: Blood from Arm, Right Updated: 11/07/21 0945     Extra Tube hold for add-on     Comment: Auto resulted       Lavender Top [[id number]] Collected: 11/07/21 0832    Specimen: Blood from Arm, Right Updated: 11/07/21 0945     Extra Tube hold for add-on     Comment: Auto resulted       Red Top [[id number]] Collected: 11/07/21 0832    Specimen: Blood from Arm, Right Updated: 11/07/21 0945     Extra Tube Hold for add-ons.     Comment: Auto resulted.       Comprehensive Metabolic Panel [[id number]]  (Abnormal) Collected: 11/07/21 0832    Specimen: Blood from Arm, Right Updated: 11/07/21 0927     Glucose 106 mg/dL      BUN 16 mg/dL      Creatinine 0.59 mg/dL      Sodium 139 mmol/L      Potassium 4.1 mmol/L      Chloride 102 mmol/L      CO2 28.0 mmol/L      Calcium 10.0 mg/dL      Total Protein 7.0 g/dL      Albumin 4.60 g/dL      ALT (SGPT) 15 U/L      AST (SGOT) 21 U/L      Alkaline Phosphatase 57 U/L      Total Bilirubin 1.2 mg/dL      eGFR Non African Amer 96 mL/min/1.73      Globulin 2.4 gm/dL      A/G Ratio 1.9 g/dL      BUN/Creatinine Ratio 27.1     Anion Gap 9.0 mmol/L     Narrative:      GFR Normal >60  Chronic Kidney Disease <60  Kidney Failure <15      Troponin [[id number]]  (Normal) Collected: 11/07/21 0832    Specimen: Blood from Arm, Right Updated: 11/07/21 0925     Troponin T <0.010 ng/mL     Narrative:      Troponin T Reference Range:  <= 0.03 ng/mL-   Negative for AMI  >0.03 ng/mL-     Abnormal for myocardial necrosis.  Clinicians would have to utilize clinical acumen, EKG, Troponin and serial changes to determine if it is an Acute Myocardial Infarction or myocardial injury due to an underlying chronic condition.       Results may be falsely decreased if patient taking Biotin.      BNP [[id number]]  (Normal) Collected: 11/07/21 0832    Specimen: Blood from Arm, Right Updated: 11/07/21 0923     proBNP 1,182.0 pg/mL     Narrative:      Among patients with dyspnea, NT-proBNP is highly  sensitive for the detection of acute congestive heart failure. In addition NT-proBNP of <300 pg/ml effectively rules out acute congestive heart failure with 99% negative predictive value.    Results may be falsely decreased if patient taking Biotin.      D-dimer, Quantitative [[id number]]  (Normal) Collected: 11/07/21 0832    Specimen: Blood from Arm, Right Updated: 11/07/21 0923     D-Dimer, Quantitative <0.22 mg/L (FEU)     Narrative:      Reference Range is 0-0.50 mg/L FEU. However, results <0.50 mg/L FEU tends to rule out DVT or PE. Results >0.50 mg/L FEU are not useful in predicting absence or presence of DVT or PE.      CBC & Differential [[id number]]  (Normal) Collected: 11/07/21 0832    Specimen: Blood from Arm, Right Updated: 11/07/21 0906    Narrative:      The following orders were created for panel order CBC & Differential.  Procedure                               Abnormality         Status                     ---------                               -----------         ------                     CBC Auto Differential[[id number]]        Normal              Final result                 Please view results for these tests on the individual orders.    CBC Auto Differential [[id number]]  (Normal) Collected: 11/07/21 0832    Specimen: Blood from Arm, Right Updated: 11/07/21 0906     WBC 5.97 10*3/mm3      RBC 4.30 10*6/mm3      Hemoglobin 13.5 g/dL      Hematocrit 40.1 %      MCV 93.3 fL      MCH 31.4 pg      MCHC 33.7 g/dL      RDW 13.0 %      RDW-SD 44.4 fl      MPV 10.7 fL      Platelets 198 10*3/mm3      Neutrophil % 59.7 %      Lymphocyte % 27.1 %      Monocyte % 10.2 %      Eosinophil % 1.5 %      Basophil % 1.0 %      Immature Grans % 0.5 %      Neutrophils, Absolute 3.56 10*3/mm3      Lymphocytes, Absolute 1.62 10*3/mm3      Monocytes, Absolute 0.61 10*3/mm3      Eosinophils, Absolute 0.09 10*3/mm3      Basophils, Absolute 0.06 10*3/mm3      Immature Grans, Absolute 0.03 10*3/mm3      nRBC 0.0 /100 WBC      "        Objective:     Vitals: /57 (BP Location: Left arm, Patient Position: Sitting)   Pulse 61   Temp 98.5 °F (36.9 °C) (Oral)   Resp 16   Ht 167.6 cm (65.98\")   Wt 62.9 kg (138 lb 11.2 oz)   SpO2 94%   BMI 22.40 kg/m²      Intake/Output Summary (Last 24 hours) at 11/10/2021 0822  Last data filed at 2021 2341  Gross per 24 hour   Intake 200 ml   Output 425 ml   Net -225 ml    Temp (24hrs), Av.1 °F (37.3 °C), Min:98 °F (36.7 °C), Max:100.5 °F (38.1 °C)      Physical Exam  Vitals reviewed.   Constitutional:       Appearance: Normal appearance. She is not ill-appearing.      Comments: Frail-appearing   HENT:      Head: Normocephalic and atraumatic.   Eyes:      General:         Right eye: No discharge.         Left eye: No discharge.      Extraocular Movements: Extraocular movements intact.      Conjunctiva/sclera: Conjunctivae normal.   Cardiovascular:      Rate and Rhythm: Bradycardia present. Rhythm irregular.      Pulses: Normal pulses.      Heart sounds: No murmur heard.      Pulmonary:      Effort: Pulmonary effort is normal. No respiratory distress.   Abdominal:      General: Abdomen is flat. Bowel sounds are normal. There is no distension.      Tenderness: There is abdominal tenderness (midepigastric).   Musculoskeletal:      Right lower leg: No edema.      Left lower leg: No edema.   Skin:     Capillary Refill: Capillary refill takes less than 2 seconds.   Neurological:      General: No focal deficit present.      Mental Status: She is alert.   Psychiatric:         Mood and Affect: Mood normal.         Behavior: Behavior normal.             Assessment and Plan:     Primary Problem:  Bradycardia    Hospital Problem list:    Bradycardia    Atrial fibrillation (HCC)    Hypertension    Hyperlipidemia    Dysphagia      PMH:  Past Medical History:   Diagnosis Date   • Atrial fibrillation (HCC)    • Bradycardia    • Cancer (HCC)     Breast   • Hyperlipidemia    • Hypertension    • " Osteoporosis        Treatment Plan:    Bradycardia: Presumed symptomatic bradycardia given her midepigastric/chest pain over the past few days prior to admission.  Negative troponin in the emergency department.  EKG shows atrial fibrillation with a slow ventricular response.  Blood pressure stable.    -Cardiology consulted, ultimately decided against pacemaker placement initially, now decided on possible pacemaker placement.  -Coumadin restarted following EGD yesterday, will hold pending pacemaker placement.     Epigastric pain/dysphagia: Initially presumed to be due to symptomatic bradycardia, but there may be a component of involvement of the gastrointestinal system.  She describes dysphagia with pills.  Little improvement with GI cocktail, but seems to improve with Pepcid which is being given twice daily.  -EGD revealed tortuous esophagus, esophageal ring that is patent, medium sized hiatal hernia.  Plan to follow-up with GI in 2 weeks.  Avoid aspirin, NSAIDs, iron, vitamin C, Fosamax, tetracyclines.  -Continue Pepcid.     Atrial fibrillation: See bradycardia above.  Not on any beta-blocker.  Anticoagulated with Coumadin (held for pacemaker).     Hypertension: Continue home medications.     Hyperlipidemia: Continue home medication.    Disposition: Plan for pacemaker placement with cardiology    Reviewed treatment plans with the patient and/or family.   45 minutes spent in face to face interaction and coordination of care.     Electronically signed by Balbir Cunningham MD on 11/10/2021 at 08:22 CST

## 2021-11-11 ENCOUNTER — HOME HEALTH ADMISSION (OUTPATIENT)
Dept: HOME HEALTH SERVICES | Facility: HOME HEALTHCARE | Age: 86
End: 2021-11-11

## 2021-11-11 VITALS
SYSTOLIC BLOOD PRESSURE: 138 MMHG | DIASTOLIC BLOOD PRESSURE: 74 MMHG | WEIGHT: 140.6 LBS | HEIGHT: 66 IN | BODY MASS INDEX: 22.6 KG/M2 | RESPIRATION RATE: 16 BRPM | TEMPERATURE: 98.1 F | HEART RATE: 57 BPM | OXYGEN SATURATION: 95 %

## 2021-11-11 LAB
ALBUMIN SERPL-MCNC: 3.6 G/DL (ref 3.5–5.2)
ALBUMIN/GLOB SERPL: 1.7 G/DL
ALP SERPL-CCNC: 53 U/L (ref 39–117)
ALT SERPL W P-5'-P-CCNC: 12 U/L (ref 1–33)
ANION GAP SERPL CALCULATED.3IONS-SCNC: 11 MMOL/L (ref 5–15)
AST SERPL-CCNC: 18 U/L (ref 1–32)
BILIRUB SERPL-MCNC: 1 MG/DL (ref 0–1.2)
BUN SERPL-MCNC: 17 MG/DL (ref 8–23)
BUN/CREAT SERPL: 30.4 (ref 7–25)
CALCIUM SPEC-SCNC: 8.3 MG/DL (ref 8.6–10.5)
CHLORIDE SERPL-SCNC: 103 MMOL/L (ref 98–107)
CO2 SERPL-SCNC: 23 MMOL/L (ref 22–29)
CREAT SERPL-MCNC: 0.56 MG/DL (ref 0.57–1)
DEPRECATED RDW RBC AUTO: 43.8 FL (ref 37–54)
ERYTHROCYTE [DISTWIDTH] IN BLOOD BY AUTOMATED COUNT: 12.9 % (ref 12.3–15.4)
GFR SERPL CREATININE-BSD FRML MDRD: 102 ML/MIN/1.73
GLOBULIN UR ELPH-MCNC: 2.1 GM/DL
GLUCOSE SERPL-MCNC: 98 MG/DL (ref 65–99)
HCT VFR BLD AUTO: 35 % (ref 34–46.6)
HGB BLD-MCNC: 11.6 G/DL (ref 12–15.9)
INR PPP: 1.31 (ref 0.91–1.09)
MCH RBC QN AUTO: 30.6 PG (ref 26.6–33)
MCHC RBC AUTO-ENTMCNC: 33.1 G/DL (ref 31.5–35.7)
MCV RBC AUTO: 92.3 FL (ref 79–97)
PLATELET # BLD AUTO: 168 10*3/MM3 (ref 140–450)
PMV BLD AUTO: 11.1 FL (ref 6–12)
POTASSIUM SERPL-SCNC: 4.3 MMOL/L (ref 3.5–5.2)
PROT SERPL-MCNC: 5.7 G/DL (ref 6–8.5)
PROTHROMBIN TIME: 15.8 SECONDS (ref 11.9–14.6)
RBC # BLD AUTO: 3.79 10*6/MM3 (ref 3.77–5.28)
SODIUM SERPL-SCNC: 137 MMOL/L (ref 136–145)
WBC # BLD AUTO: 5.91 10*3/MM3 (ref 3.4–10.8)

## 2021-11-11 PROCEDURE — 99024 POSTOP FOLLOW-UP VISIT: CPT | Performed by: INTERNAL MEDICINE

## 2021-11-11 PROCEDURE — 85027 COMPLETE CBC AUTOMATED: CPT | Performed by: INTERNAL MEDICINE

## 2021-11-11 PROCEDURE — 85610 PROTHROMBIN TIME: CPT | Performed by: INTERNAL MEDICINE

## 2021-11-11 PROCEDURE — 80053 COMPREHEN METABOLIC PANEL: CPT | Performed by: INTERNAL MEDICINE

## 2021-11-11 RX ORDER — FAMOTIDINE 20 MG/1
20 TABLET, FILM COATED ORAL 2 TIMES DAILY
Qty: 180 TABLET | Refills: 0 | Status: SHIPPED | OUTPATIENT
Start: 2021-11-11 | End: 2022-04-07

## 2021-11-11 RX ADMIN — FAMOTIDINE 20 MG: 10 INJECTION INTRAVENOUS at 09:28

## 2021-11-11 RX ADMIN — SODIUM CHLORIDE, PRESERVATIVE FREE 3 ML: 5 INJECTION INTRAVENOUS at 09:29

## 2021-11-11 RX ADMIN — ACETAMINOPHEN 650 MG: 325 TABLET, FILM COATED ORAL at 09:36

## 2021-11-11 RX ADMIN — Medication 1000 MCG: at 09:28

## 2021-11-11 RX ADMIN — LISINOPRIL 10 MG: 10 TABLET ORAL at 09:28

## 2021-11-11 NOTE — NURSING NOTE
Spoke with patient and her son regarding referral for home care services and they are both in agreement to this. Patient verified her address and phone numbers listed are correct. Card with home care number given to patient to call with any questions or concerns.

## 2021-11-11 NOTE — CASE MANAGEMENT/SOCIAL WORK
Continued Stay Note  [facility]     Patient Name: Yudi Westbrook  MRN: [id number]  Today's Date: 11/11/2021    Admit Date: 11/7/2021     Discharge Plan     Row Name 11/11/21 1020       Plan    Plan Home with [facility]    Provided Post Acute Provider List? Yes    Post Acute Provider List Home Health    Delivered To Support Person    Method of Delivery In person    Patient/Family in Agreement with Plan yes    Final Discharge Disposition Code 06 - home with home health care    Final Note Pt's family requested [facility].  SW requested orders.  SW recieved and has informed Dacia at [facility] and put orders in her inbox.  Pt's son also has sitters list.    Row Name 11/11/21 0857       Plan    Patient/Family in Agreement with Plan yes    Final Discharge Disposition Code 01 - home or self-care               Discharge Codes    No documentation.               Expected Discharge Date and Time     Expected Discharge Date Expected Discharge Time    Nov 11, 2021             RAMON Valenzuela

## 2021-11-11 NOTE — PROGRESS NOTES
LOS: 4 days   Patient Care Team:  Elia Preston MD as PCP - General (Family Medicine)  Elia Preston MD as Consulting Physician (Family Medicine)    Chief Complaint:      Subjective    Yudi Westbrook is a 89 y.o. female who is being seen in follow-up.    Overnight feels well  Had pacemaker implanted through left subclavian approach  No immediate complications  Post procedure chest x-ray looks satisfactory  Had minor soreness at the pacemaker insertion site that resolved with Tylenol  No new issues or events  Resting well  Hemodynamically stable  Excellent pacing and sensing parameters  Telemetry shows atrial fibrillation with demand ventricular pacing  Latest labs reviewed  No redness or discharge from pacemaker insertion site  No excessive shortness of breath    Review of Systems   Constitutional: No chills   Has fatigue   No fever.   HENT: Negative.    Eyes: Negative.    Respiratory: Negative for cough,   No chest wall soreness,   Shortness of breath,   no wheezing, no stridor.    Cardiovascular: As above  Gastrointestinal: Negative for abdominal distention,  No abdominal pain,   No blood in stool,   No constipation,   No diarrhea,   No nausea   No vomiting.   Endocrine: Negative.    Genitourinary: Negative for difficulty urinating, dysuria, flank pain and hematuria.   Musculoskeletal: Negative.    Skin: Negative for rash and wound.   Allergic/Immunologic: Negative.    Neurological: Negative for dizziness, syncope, weakness,   No light-headedness  No  headaches.   Hematological: Does not bruise/bleed easily.   Psychiatric/Behavioral: Negative for agitation or behavioral problems,   No confusion,   the patient is  nervous/anxious.       History:   Past Medical History:   Diagnosis Date   • Atrial fibrillation (HCC)    • Bradycardia    • Cancer (HCC) 2002    Breast   • Hyperlipidemia    • Hypertension    • Osteoporosis      Past Surgical History:   Procedure Laterality Date   • APPENDECTOMY     •  ENDOSCOPY N/A 11/9/2021    Procedure: ESOPHAGOGASTRODUODENOSCOPY WITH ANESTHESIA;  Surgeon: Juanito Perez MD;  Location: [facility] ENDOSCOPY;  Service: Gastroenterology;  Laterality: N/A;  pre dysphagia  post  Elia Preston MD   • HYSTERECTOMY     • MASTECTOMY Bilateral      Social History     Socioeconomic History   • Marital status:    Tobacco Use   • Smoking status: Never Smoker   • Smokeless tobacco: Never Used   Substance and Sexual Activity   • Alcohol use: No   • Drug use: No   • Sexual activity: Defer     Family History   Problem Relation Age of Onset   • Hypertension Father    • Heart attack Brother    • Cancer Brother        Labs:  WBC WBC   Date Value Ref Range Status   11/11/2021 5.91 3.40 - 10.80 10*3/mm3 Final   11/10/2021 8.68 3.40 - 10.80 10*3/mm3 Final   11/10/2021 8.30 3.40 - 10.80 10*3/mm3 Final   11/09/2021 8.62 3.40 - 10.80 10*3/mm3 Final   11/08/2021 8.29 3.40 - 10.80 10*3/mm3 Final      HGB Hemoglobin   Date Value Ref Range Status   11/11/2021 11.6 (L) 12.0 - 15.9 g/dL Final   11/10/2021 12.8 12.0 - 15.9 g/dL Final   11/10/2021 11.6 (L) 12.0 - 15.9 g/dL Final   11/09/2021 12.2 12.0 - 15.9 g/dL Final   11/08/2021 13.2 12.0 - 15.9 g/dL Final      HCT Hematocrit   Date Value Ref Range Status   11/11/2021 35.0 34.0 - 46.6 % Final   11/10/2021 39.3 34.0 - 46.6 % Final   11/10/2021 34.9 34.0 - 46.6 % Final   11/09/2021 37.6 34.0 - 46.6 % Final   11/08/2021 40.2 34.0 - 46.6 % Final      Platelets Platelets   Date Value Ref Range Status   11/11/2021 168 140 - 450 10*3/mm3 Final   11/10/2021 185 140 - 450 10*3/mm3 Final   11/10/2021 150 140 - 450 10*3/mm3 Final   11/09/2021 183 140 - 450 10*3/mm3 Final   11/08/2021 194 140 - 450 10*3/mm3 Final      MCV MCV   Date Value Ref Range Status   11/11/2021 92.3 79.0 - 97.0 fL Final   11/10/2021 93.6 79.0 - 97.0 fL Final   11/10/2021 92.6 79.0 - 97.0 fL Final   11/09/2021 92.4 79.0 - 97.0 fL Final   11/08/2021 94.1 79.0 - 97.0 fL Final         Results from last 7 days   Lab Units 11/11/21  0517 11/10/21  1650 11/10/21  0605 11/09/21  0401 11/09/21  0401   SODIUM mmol/L 137 136 134*   < > 135*   POTASSIUM mmol/L 4.3 4.3 4.3   < > 4.2   CHLORIDE mmol/L 103 99 99   < > 100   CO2 mmol/L 23.0 25.0 26.0   < > 25.0   BUN mg/dL 17 16 14   < > 20   CREATININE mg/dL 0.56* 0.57 0.52*   < > 0.65   CALCIUM mg/dL 8.3* 9.3 8.7   < > 9.4   BILIRUBIN mg/dL 1.0  --  1.6*  --  1.4*   ALK PHOS U/L 53  --  49  --  50   ALT (SGPT) U/L 12  --  8  --  13   AST (SGOT) U/L 18  --  16  --  15   GLUCOSE mg/dL 98 119* 105*   < > 147*    < > = values in this interval not displayed.     Lab Results   Component Value Date    TROPONINT <0.010 11/07/2021     PT/INR:    Protime   Date Value Ref Range Status   11/11/2021 15.8 (H) 11.9 - 14.6 Seconds Final   11/10/2021 15.3 (H) 11.9 - 14.6 Seconds Final   11/09/2021 16.9 (H) 11.9 - 14.6 Seconds Final   /  INR   Date Value Ref Range Status   11/11/2021 1.31 (H) 0.91 - 1.09 Final   11/10/2021 1.26 (H) 0.91 - 1.09 Final   11/09/2021 1.43 (H) 0.91 - 1.09 Final       Imaging Results (Last 72 Hours)     Procedure Component Value Units Date/Time    XR Chest 1 View [[id number]] Collected: 11/10/21 1546     Updated: 11/10/21 1549    Narrative:      EXAM: XR CHEST 1 VW- 11/10/2021 3:12 PM CST     HISTORY: post pacemaker; R00.1-Bradycardia, unspecified; R07.9-Chest  pain, unspecified; R00.1-Bradycardia, unspecified; R13.10-Dysphagia,  unspecified       COMPARISON: November 7, 2021.     TECHNIQUE: Frontal radiograph of the chest     FINDINGS:   Chronic changes present in the pulmonary parenchyma.. Cardiac  silhouettes mildly enlarged. Pacing appliances present soft tissues the  left chest. There is no pneumothorax..      The osseous structures and surrounding soft tissues demonstrate no acute  abnormality.          Impression:      1. Chronic interstitial change in the pulmonary parenchyma with no acute  cardiopulmonary process.        This report  was finalized on 11/10/2021 15:46 by Dr. Howard Henderson MD.          Objective     Allergies   Allergen Reactions   • Morphine Unknown - Low Severity     Has not taken. But family members cannot take.   • Penicillins Rash       Medication Review: Performed  Current Facility-Administered Medications   Medication Dose Route Frequency Provider Last Rate Last Admin   • acetaminophen (TYLENOL) tablet 650 mg  650 mg Oral Q4H PRN Charles Nunez MD   650 mg at 11/10/21 2308    Or   • acetaminophen (TYLENOL) suppository 650 mg  650 mg Rectal Q4H PRN Charles Nunez MD       • aluminum-magnesium hydroxide-simethicone (MAALOX MAX) 400-400-40 MG/5ML suspension 15 mL  15 mL Oral Q6H PRN Charles Nunez MD   15 mL at 11/09/21 0541   • atorvastatin (LIPITOR) tablet 10 mg  10 mg Oral Nightly Charles Nunez MD   10 mg at 11/10/21 2025   • famotidine (PEPCID) injection 20 mg  20 mg Intravenous Q12H Charles Nunez MD   20 mg at 11/10/21 2025   • fluticasone (FLONASE) 50 MCG/ACT nasal spray 2 spray  2 spray Nasal Daily PRN Charles Nunez MD       • lisinopril (PRINIVIL,ZESTRIL) tablet 10 mg  10 mg Oral Q24H Charles Nunez MD   10 mg at 11/10/21 0844   • ondansetron (ZOFRAN) injection 4 mg  4 mg Intravenous Q6H PRN Charles Nunez MD   4 mg at 11/07/21 1958   • sodium chloride 0.9 % flush 10 mL  10 mL Intravenous PRN Charles Nunez MD       • sodium chloride 0.9 % flush 10 mL  10 mL Intravenous Q12H Charles Nunez MD   10 mL at 11/10/21 0844   • sodium chloride 0.9 % flush 3 mL  3 mL Intravenous Q12H Charles Nunez MD       • sodium chloride 0.9 % flush 3-10 mL  3-10 mL Intravenous PRN Charles Nunez MD       • sodium chloride 0.9 % infusion  60 mL/hr Intravenous Continuous Charles Nunez MD 60 mL/hr at 11/10/21 1646 60 mL/hr at 11/10/21 1646   • vitamin B-12 (CYANOCOBALAMIN) tablet 1,000 mcg  1,000 mcg Oral Daily Charles Nunez MD   1,000 mcg at 11/10/21 0844       Vital Sign Min/Max for last 24 hours  Temp  Min: 97.3 °F (36.3 °C)  Max: 98.9 °F (37.2  "°C)   BP  Min: 129/64  Max: 149/56   Pulse  Min: 51  Max: 66   Resp  Min: 16  Max: 23   SpO2  Min: 89 %  Max: 100 %   No data recorded   Weight  Min: 62.6 kg (138 lb)  Max: 63.8 kg (140 lb 9.6 oz)     Flowsheet Rows      First Filed Value   Admission Height 167.6 cm (66\") Documented at 11/07/2021 0736   Admission Weight 62.6 kg (138 lb) Documented at 11/07/2021 0736          Results for orders placed during the hospital encounter of 11/07/21    Adult Transthoracic Echo Complete W/ Cont if Necessary Per Protocol    Interpretation Summary  · Left ventricular ejection fraction appears to be 56 - 60%. Left ventricular systolic function is normal.  · Left atrial volume is severely increased.  · Moderate tricuspid valve regurgitation is present.  · The right atrial cavity is moderately dilated.  · Moderate pulmonary hypertension is present.      Physical Exam:    General Appearance: Awake, alert, in no acute distress  Eyes: Pupils equal and reactive    Ears: Appear intact with no abnormalities noted  Nose: Nares normal, no drainage  Neck: supple, trachea midline, no carotid bruit and no JVD  Back: no kyphosis present,    Lungs: respirations regular, respirations even and respirations unlabored  Heart: normal S1, S2, irregular, 2/6 systolic murmur left sternal border  No rubs  Abdomen: normal bowel sounds, no tenderness   Skin: no bleeding, bruising or rash  Extremities: no cyanosis  Psychiatric/Behavioral: Negative for agitation, behavioral problems, confusion, the patient does  appear to be nervous/anxious.       Results Review:   I reviewed the patient's new clinical results.  I reviewed the patient's new imaging results and agree with the interpretation.  I reviewed the patient's other test results and agree with the interpretation  I personally viewed and interpreted the patient's EKG/Telemetry data    Discussed with patient  Updated patient regarding any new or relevant abnormalities on review of records or any new " findings on physical exam.   Mentioned to patient about purpose of visit and desirable health short and long term goals and objectives.     Reviewed available prior notes, consults, prior visits, laboratory findings, radiology and cardiology relevant reports.   Updated chart as applicable.   I have reviewed the patient's medical history in detail and updated the computerized patient record as relevant.          Assessment/Plan       Bradycardia    Atrial fibrillation (HCC)    Hypertension    Hyperlipidemia    Dysphagia      Plan    Excellent pacemaker parameters on check this morning  Okay to discharge from hospital from cardiology perspective  Short-term follow-up with cardiology with TIAN Lowry in 2 weeks  Usual post procedure precautions  Monitor for any signs of bleeding swelling redness or discharge  Monitor pacemaker parameters as outpatient  Okay to resume anticoagulation  No additional cardiac testing currently required  Overall improving      Charles Nunez MD  11/11/21  07:47 CST    EMR Dragon/Transcription was used to dictate part of this note

## 2021-11-11 NOTE — DISCHARGE SUMMARY
Hospital Discharge Summary    Yudi Westbrook  :  1932  MRN:  [id number]    Admit date:  2021  Discharge date: 2021    Admitting Physician:    Elia Preston MD    Discharge Diagnoses:      Bradycardia    Atrial fibrillation (HCC)    Hypertension    Hyperlipidemia    Dysphagia      Hospital Course:   The patient is a 89 y.o. female who presents with A history of chronic atrial fibrillation on Coumadin, hypertension, hyperlipidemia, breast cancer status post mastectomies.  She went to the emergency department due to chest discomfort/epigastric pain.  She states this been going on since 2021.  She denies any vomiting.  Pain has been intermittent.  In the emergency department, she was found to be in atrial fibrillation with heart rate down into the 30s occasionally.  Points to her mid epigastric when asked if she is having any pain at the time of admission.  Troponin negative x2 in the emergency department.  Cardiology consulted from the ED and ultimately placed a pacemaker on 11/10/2021.  Gastroenterology was consulted and EGD performed showing a tortuous distal esophagus, medium-sized hiatal hernia, no overt ulcer.    The patient was admitted for the above noted medical/surgical issues. Please see daily progress note for further details concerning their stay. The patient improved throughout their stay and reached maximum medical improvement on the day of discharge. The patient/family agree with the treatment plan as outlined above. All questions concerning their stay were answered prior to discharge. They understand the importance of follow up concerning any abnormal test results.     Physical Exam  Vitals reviewed.   Constitutional:       Appearance: Normal appearance. She is not ill-appearing.      Comments: Frail-appearing   HENT:      Head: Normocephalic and atraumatic.   Eyes:      General:         Right eye: No discharge.         Left eye: No discharge.      Extraocular  Movements: Extraocular movements intact.      Conjunctiva/sclera: Conjunctivae normal.   Cardiovascular:      Rate and Rhythm: Bradycardia present.      Pulses: Normal pulses.      Heart sounds: No murmur heard.       Comments: Wound from pacemaker placement on anterior left chest wall clean dry and intact.  Pulmonary:      Effort: Pulmonary effort is normal. No respiratory distress.   Abdominal:      General: Abdomen is flat. Bowel sounds are normal. There is no distension.      Tenderness: There is abdominal tenderness (midepigastric, significantly improved).   Musculoskeletal:      Right lower leg: No edema.      Left lower leg: No edema.   Skin:     Capillary Refill: Capillary refill takes less than 2 seconds.   Neurological:      General: No focal deficit present.      Mental Status: She is alert.   Psychiatric:         Mood and Affect: Mood normal.         Behavior: Behavior normal.           Discharge Medications:         Discharge Medications      New Medications      Instructions Start Date   famotidine 20 MG tablet  Commonly known as: Pepcid   20 mg, Oral, 2 Times Daily         Continue These Medications      Instructions Start Date   BIOTIN 5000 PO   1 tablet, Oral, Weekly      Calcium 600+D3 600-200 MG-UNIT tablet  Generic drug: Calcium Carb-Cholecalciferol   1 tablet, Oral, Daily      fluticasone 50 MCG/ACT nasal spray  Commonly known as: FLONASE   2 sprays, Nasal, Daily PRN, prn      lisinopril-hydrochlorothiazide 10-12.5 MG per tablet  Commonly known as: PRINZIDE,ZESTORETIC   1 tablet, Oral, Daily      Magnesium 250 MG tablet   1 tablet, Oral, Daily      Potassium 99 MG tablet   1 tablet, Oral, Daily      simvastatin 20 MG tablet  Commonly known as: ZOCOR   20 mg, Oral, Nightly      vitamin B-12 1000 MCG tablet  Commonly known as: CYANOCOBALAMIN   1 tablet, Oral, Daily      Vitamin E 100 units tablet   1 tablet, Oral, Daily      warfarin 5 MG tablet  Commonly known as: COUMADIN   Takes 7.5 mg daily  except Saturday takes 5 mg         Stop These Medications    alendronate 70 MG tablet  Commonly known as: Fosamax            Activity: As tolerated    Diet: As tolerated    Consults: Gastroenterology, cardiology    Significant Diagnostic Studies:    XR Chest 1 View    Result Date: 11/10/2021  1. Chronic interstitial change in the pulmonary parenchyma with no acute cardiopulmonary process.   This report was finalized on 11/10/2021 15:46 by Dr. Howard Henderson MD.    XR Chest 1 View    Result Date: 11/7/2021  1.. Chronic changes of the lungs. There is mild cardiomegaly. 2. No evidence of lobar pneumonia or effusion. This report was finalized on 11/07/2021 08:18 by Dr. Joseph Santos MD.    CT Angiogram Chest    Result Date: 11/7/2021  1. No evidence of pulmonary thromboembolic disease. There is enlargement of the pulmonary arteries suggesting pulmonary arterial hypertension. 2. Moderate cardiomegaly. Coronary calcifications are present. There is both left and right chamber enlargement present. There is elevated right heart pressure with reflux of contrast into the intrahepatic IVC which is dilated. 3. The thoracic aorta is ectatic but without evidence of dissection or aneurysm. There is mild calcific plaquing at the origin of the great vessels without evidence of associated stenosis within the innominate, left common carotid and left subclavian artery. The left vertebral artery emanates directly from the aortic arch and there is more significant stenosis at its origin with at least moderate cross-sectional narrowing. The left vertebral artery does otherwise demonstrate normal enhancement proximally. 4. Bilateral lower lobe and left lingular atelectasis. No evidence of consolidative pneumonia or effusion. This report was finalized on 11/07/2021 11:06 by Dr. Joseph Santos MD.           Treatments:   Pacemaker placement, EGD.    Disposition:   Discharge home with outpatient follow-up cardiology, gastroenterology,  PCP.  I stopped her Fosamax at the time of discharge per gastroenterology recommendation.    Time spent on discharge including discussion with patient/family, SW, and coordination of care.     Follow up with Elia Preston MD in 1-2 weeks.    Signed:  Balbir Cunningham MD   11/11/2021, 08:23 CST

## 2021-11-11 NOTE — PLAN OF CARE
Goal Outcome Evaluation:           Progress: no change  Outcome Summary: Ptt with c/o pain to incisional site, relieved by prn tylenol. VSS.  60-66 per tele. Saftey maintained.

## 2021-11-12 ENCOUNTER — READMISSION MANAGEMENT (OUTPATIENT)
Dept: CALL CENTER | Facility: HOSPITAL | Age: 86
End: 2021-11-12

## 2021-11-12 NOTE — OUTREACH NOTE
Prep Survey      Responses   [facility] facility patient discharged from? [facility]   Is LACE score < 7 ? No   Emergency Room discharge w/ pulse ox? No   Eligibility Readm Mgmt   Discharge diagnosis Sid   Does the patient have one of the following disease processes/diagnoses(primary or secondary)? Other   Does the patient have Home health ordered? Yes   What is the Home health agency?  [facility]   Is there a DME ordered? No   Comments regarding appointments Listed   General alerts for this patient Pacemaker placement   Prep survey completed? Yes          Roshni White RN

## 2021-11-14 ENCOUNTER — HOME CARE VISIT (OUTPATIENT)
Dept: HOME HEALTH SERVICES | Facility: CLINIC | Age: 86
End: 2021-11-14

## 2021-11-15 NOTE — CASE COMMUNICATION
Pt not admitted to [facility] due to not being homebound.

## 2021-11-16 ENCOUNTER — READMISSION MANAGEMENT (OUTPATIENT)
Dept: CALL CENTER | Facility: HOSPITAL | Age: 86
End: 2021-11-16

## 2021-11-16 NOTE — OUTREACH NOTE
Medical Week 1 Survey      Responses   [facility] patient discharged from? [facility]   Does the patient have one of the following disease processes/diagnoses(primary or secondary)? Other   Week 1 attempt successful? Yes   Call start time 1424   Call end time 1431   General alerts for this patient Pacemaker placement   Discharge diagnosis Sid   Person spoke with today (if not patient) and relationship patient   Meds reviewed with patient/caregiver? Yes   Is the patient having any side effects they believe may be caused by any medication additions or changes? No   Does the patient have all medications ordered at discharge? Yes   Is the patient taking all medications as directed (includes completed medication regime)? Yes   Does the patient have a primary care provider?  Yes   Does the patient have an appointment with their PCP within 7 days of discharge? Yes   Comments regarding PCP 11/18/21   Has the patient kept scheduled appointments due by today? N/A   What is the Home health agency?  [facility]   Has home health visited the patient within 72 hours of discharge? No   Psychosocial issues? No   Did the patient receive a copy of their discharge instructions? Yes   Nursing interventions Reviewed instructions with patient   What is the patient's perception of their health status since discharge? Improving   Is the patient/caregiver able to teach back signs and symptoms related to disease process for when to call PCP? Yes   Is the patient/caregiver able to teach back signs and symptoms related to disease process for when to call 911? Yes   Is the patient/caregiver able to teach back the hierarchy of who to call/visit for symptoms/problems? PCP, Specialist, Home health nurse, Urgent Care, ED, 911 Yes   If the patient is a current smoker, are they able to teach back resources for cessation? Not a smoker   Week 1 call completed? Yes   Wrap up additional comments Pt states she is doing better. Pt refused HH. No  questions/concerns.          Elen Hernandez RN

## 2021-11-23 ENCOUNTER — READMISSION MANAGEMENT (OUTPATIENT)
Dept: CALL CENTER | Facility: HOSPITAL | Age: 86
End: 2021-11-23

## 2021-11-23 NOTE — OUTREACH NOTE
Medical Week 2 Survey      Responses   [facility] patient discharged from? [place]   Does the patient have one of the following disease processes/diagnoses(primary or secondary)? Other   Week 2 attempt successful? Yes   Call start time 1628   General alerts for this patient Pacemaker placement   Call end time 1630   Person spoke with today (if not patient) and relationship patient   Meds reviewed with patient/caregiver? Yes   Is the patient taking all medications as directed (includes completed medication regime)? Yes   Has the patient kept scheduled appointments due by today? Yes   What is the patient's perception of their health status since discharge? Improving   Week 2 Call Completed? Yes   Wrap up additional comments Doing ok.  Denies needs at this time.          Adela Burr, RN

## 2021-12-01 ENCOUNTER — OFFICE VISIT (OUTPATIENT)
Dept: GASTROENTEROLOGY | Facility: CLINIC | Age: 86
End: 2021-12-01

## 2021-12-01 VITALS
SYSTOLIC BLOOD PRESSURE: 132 MMHG | HEART RATE: 123 BPM | DIASTOLIC BLOOD PRESSURE: 62 MMHG | TEMPERATURE: 97.7 F | OXYGEN SATURATION: 90 % | HEIGHT: 66 IN | WEIGHT: 141 LBS | BODY MASS INDEX: 22.66 KG/M2

## 2021-12-01 DIAGNOSIS — Z78.9 NONSMOKER: ICD-10-CM

## 2021-12-01 DIAGNOSIS — K22.4 ESOPHAGEAL DYSMOTILITY: Primary | ICD-10-CM

## 2021-12-01 DIAGNOSIS — K22.89 PRESBYESOPHAGUS: ICD-10-CM

## 2021-12-01 PROCEDURE — 99213 OFFICE O/P EST LOW 20 MIN: CPT | Performed by: CLINICAL NURSE SPECIALIST

## 2021-12-01 RX ORDER — ALENDRONATE SODIUM 70 MG/1
70 TABLET ORAL
COMMUNITY
End: 2022-04-07

## 2021-12-01 NOTE — PROGRESS NOTES
Yudi Westbrook  2/25/1932 12/1/2021  Chief Complaint   Patient presents with   • GI Problem     New patient ref by Dr. Perez for problems swallowing         HPI    Yudi Westbrook is a  89 y.o. female here for a follow up visit for dysphagia that was severe prior to hospitalization 11/9/21. Endoscopy was performed and esophageal ring was noted, medium hiatal hernia. presbyesophagus tortuous esophagus noted, mild. Today she says that she is improved No difficulty at this time. No nausea or vomiting. No chest pain. She does not desire another colonoscopy she has gotten these in [place] in the past.     Past Medical History:   Diagnosis Date   • Atrial fibrillation (HCC)    • Bradycardia    • Cancer (HCC) 2002    Breast   • Hyperlipidemia    • Hypertension    • Osteoporosis      Past Surgical History:   Procedure Laterality Date   • APPENDECTOMY     • CARDIAC ELECTROPHYSIOLOGY PROCEDURE N/A 11/10/2021    Procedure: Device Implant single chamber mri;  Surgeon: Charles Nunez MD;  Location: [facility] CATH INVASIVE LOCATION;  Service: Cardiology;  Laterality: N/A;   • ENDOSCOPY N/A 11/9/2021    Esophageal ring, medium size HH, Chronic active gastritis   • HYSTERECTOMY     • MASTECTOMY Bilateral        Outpatient Medications Marked as Taking for the 12/1/21 encounter (Office Visit) with Maye Sprague APRN   Medication Sig Dispense Refill   • alendronate (Fosamax) 70 MG tablet Take 70 mg by mouth Every 7 (Seven) Days.     • famotidine (Pepcid) 20 MG tablet Take 1 tablet by mouth 2 (Two) Times a Day. 180 tablet 0   • fluticasone (FLONASE) 50 MCG/ACT nasal spray 2 sprays into the nostril(s) as directed by provider Daily As Needed for Allergies. prn      • lisinopril-hydrochlorothiazide (PRINZIDE,ZESTORETIC) 10-12.5 MG per tablet Take 1 tablet by mouth Daily.     • Magnesium 250 MG tablet Take 1 tablet by mouth Daily.     • Potassium 99 MG tablet Take 1 tablet by mouth Daily.     • simvastatin (ZOCOR) 20 MG  tablet Take 20 mg by mouth Every Night.     • vitamin B-12 (CYANOCOBALAMIN) 1000 MCG tablet Take 1 tablet by mouth Daily.     • Vitamin E 100 units tablet Take 1 tablet by mouth Daily.     • warfarin (COUMADIN) 5 MG tablet Takes 7.5 mg daily except Saturday takes 5 mg         Allergies   Allergen Reactions   • Morphine Unknown - Low Severity     Has not taken. But family members cannot take.   • Penicillins Rash       Social History     Socioeconomic History   • Marital status:    Tobacco Use   • Smoking status: Never Smoker   • Smokeless tobacco: Never Used   Substance and Sexual Activity   • Alcohol use: No   • Drug use: No   • Sexual activity: Defer       Family History   Problem Relation Age of Onset   • Hypertension Father    • Heart attack Brother    • Cancer Brother    • Colon cancer Neg Hx    • Colon polyps Neg Hx        Review of Systems   Constitutional: Negative for activity change, appetite change, chills, diaphoresis, fatigue, fever and unexpected weight change.   HENT: Positive for trouble swallowing. Negative for ear pain, hearing loss, mouth sores, sore throat and voice change.    Eyes: Negative.    Respiratory: Negative for cough, choking, shortness of breath and wheezing.    Cardiovascular: Negative for chest pain and palpitations.   Gastrointestinal: Negative for abdominal pain, blood in stool, constipation, diarrhea, nausea and vomiting.   Endocrine: Negative for cold intolerance and heat intolerance.   Genitourinary: Negative for decreased urine volume, dysuria, frequency, hematuria and urgency.   Musculoskeletal: Negative for back pain, gait problem and myalgias.   Skin: Negative for color change, pallor and rash.   Allergic/Immunologic: Negative for food allergies and immunocompromised state.   Neurological: Negative for dizziness, tremors, seizures, syncope, weakness, light-headedness, numbness and headaches.   Hematological: Negative for adenopathy. Does not bruise/bleed easily.  "  Psychiatric/Behavioral: Negative for agitation and confusion. The patient is not nervous/anxious.    All other systems reviewed and are negative.      /62   Pulse (!) 123   Temp 97.7 °F (36.5 °C)   Ht 167.6 cm (66\")   Wt 64 kg (141 lb)   SpO2 90%   Breastfeeding No   BMI 22.76 kg/m²   Body mass index is 22.76 kg/m².    Physical Exam  Constitutional:       Appearance: She is well-developed.   HENT:      Head: Normocephalic and atraumatic.   Eyes:      Pupils: Pupils are equal, round, and reactive to light.   Neck:      Trachea: No tracheal deviation.   Cardiovascular:      Rate and Rhythm: Normal rate and regular rhythm.      Heart sounds: Normal heart sounds. No murmur heard.  No friction rub. No gallop.    Pulmonary:      Effort: Pulmonary effort is normal. No respiratory distress.      Breath sounds: Normal breath sounds. No wheezing or rales.   Chest:      Chest wall: No tenderness.   Abdominal:      General: Bowel sounds are normal. There is no distension.      Palpations: Abdomen is soft. Abdomen is not rigid.      Tenderness: There is no abdominal tenderness. There is no guarding or rebound.   Musculoskeletal:         General: No tenderness or deformity. Normal range of motion.      Cervical back: Normal range of motion and neck supple.   Skin:     General: Skin is warm and dry.      Coloration: Skin is not pale.      Findings: No rash.   Neurological:      Mental Status: She is alert and oriented to person, place, and time.      Deep Tendon Reflexes: Reflexes are normal and symmetric.   Psychiatric:         Behavior: Behavior normal.         Thought Content: Thought content normal.         Judgment: Judgment normal.         ASSESSMENT AND PLAN    Patient's Body mass index is 22.76 kg/m². indicating that she is within normal range (BMI 18.5-24.9). No BMI management plan needed..    Diagnoses and all orders for this visit:    1. Esophageal dysmotility (Primary)    2. Nonsmoker    3. " Josef    explained all results and endoscopy reviewed today.   Advised her to eat smaller bites, take her time eating and drink liquids. Chewing her food well and small bites advised especially.         There are no Patient Instructions on file for this visit.  Maye Chelly Sprague, APRN  10:58 CST  12/1/2021    Obesity, Adult  Obesity is the condition of having too much total body fat. Being overweight or obese means that your weight is greater than what is considered healthy for your body size. Obesity is determined by a measurement called BMI. BMI is an estimate of body fat and is calculated from height and weight. For adults, a BMI of 30 or higher is considered obese.  Obesity can eventually lead to other health concerns and major illnesses, including:  · Stroke.  · Coronary artery disease (CAD).  · Type 2 diabetes.  · Some types of cancer, including cancers of the colon, breast, uterus, and gallbladder.  · Osteoarthritis.  · High blood pressure (hypertension).  · High cholesterol.  · Sleep apnea.  · Gallbladder stones.  · Infertility problems.  What are the causes?  The main cause of obesity is taking in (consuming) more calories than your body uses for energy. Other factors that contribute to this condition may include:  · Being born with genes that make you more likely to become obese.  · Having a medical condition that causes obesity. These conditions include:  ¨ Hypothyroidism.  ¨ Polycystic ovarian syndrome (PCOS).  ¨ Binge-eating disorder.  ¨ Cushing syndrome.  · Taking certain medicines, such as steroids, antidepressants, and seizure medicines.  · Not being physically active (sedentary lifestyle).  · Living where there are limited places to exercise safely or buy healthy foods.  · Not getting enough sleep.  What increases the risk?  The following factors may increase your risk of this condition:  · Having a family history of obesity.  · Being a woman of -American descent.  · Being a man  of  descent.  What are the signs or symptoms?  Having excessive body fat is the main symptom of this condition.  How is this diagnosed?  This condition may be diagnosed based on:  · Your symptoms.  · Your medical history.  · A physical exam. Your health care provider may measure:  ¨ Your BMI. If you are an adult with a BMI between 25 and less than 30, you are considered overweight. If you are an adult with a BMI of 30 or higher, you are considered obese.  ¨ The distances around your hips and your waist (circumferences). These may be compared to each other to help diagnose your condition.  ¨ Your skinfold thickness. Your health care provider may gently pinch a fold of your skin and measure it.  How is this treated?  Treatment for this condition often includes changing your lifestyle. Treatment may include some or all of the following:  · Dietary changes. Work with your health care provider and a dietitian to set a weight-loss goal that is healthy and reasonable for you. Dietary changes may include eating:  ¨ Smaller portions. A portion size is the amount of a particular food that is healthy for you to eat at one time. This varies from person to person.  ¨ Low-calorie or low-fat options.  ¨ More whole grains, fruits, and vegetables.  · Regular physical activity. This may include aerobic activity (cardio) and strength training.  · Medicine to help you lose weight. Your health care provider may prescribe medicine if you are unable to lose 1 pound a week after 6 weeks of eating more healthily and doing more physical activity.  · Surgery. Surgical options may include gastric banding and gastric bypass. Surgery may be done if:  ¨ Other treatments have not helped to improve your condition.  ¨ You have a BMI of 40 or higher.  ¨ You have life-threatening health problems related to obesity.  Follow these instructions at home:     Eating and drinking     · Follow recommendations from your health care provider about what  you eat and drink. Your health care provider may advise you to:  ¨ Limit fast foods, sweets, and processed snack foods.  ¨ Choose low-fat options, such as low-fat milk instead of whole milk.  ¨ Eat 5 or more servings of fruits or vegetables every day.  ¨ Eat at home more often. This gives you more control over what you eat.  ¨ Choose healthy foods when you eat out.  ¨ Learn what a healthy portion size is.  ¨ Keep low-fat snacks on hand.  ¨ Avoid sugary drinks, such as soda, fruit juice, iced tea sweetened with sugar, and flavored milk.  ¨ Eat a healthy breakfast.  · Drink enough water to keep your urine clear or pale yellow.  · Do not go without eating for long periods of time (do not fast) or follow a fad diet. Fasting and fad diets can be unhealthy and even dangerous.  Physical Activity   · Exercise regularly, as told by your health care provider. Ask your health care provider what types of exercise are safe for you and how often you should exercise.  · Warm up and stretch before being active.  · Cool down and stretch after being active.  · Rest between periods of activity.  Lifestyle   · Limit the time that you spend in front of your TV, computer, or video game system.  · Find ways to reward yourself that do not involve food.  · Limit alcohol intake to no more than 1 drink a day for nonpregnant women and 2 drinks a day for men. One drink equals 12 oz of beer, 5 oz of wine, or 1½ oz of hard liquor.  General instructions   · Keep a weight loss journal to keep track of the food you eat and how much you exercise you get.  · Take over-the-counter and prescription medicines only as told by your health care provider.  · Take vitamins and supplements only as told by your health care provider.  · Consider joining a support group. Your health care provider may be able to recommend a support group.  · Keep all follow-up visits as told by your health care provider. This is important.  Contact a health care provider  if:  · You are unable to meet your weight loss goal after 6 weeks of dietary and lifestyle changes.  This information is not intended to replace advice given to you by your health care provider. Make sure you discuss any questions you have with your health care provider.  Document Released: 01/25/2006 Document Revised: 05/22/2017 Document Reviewed: 10/05/2016  [employer] Patient Education © 2017 [employer]      IF YOU SMOKE OR USE TOBACCO PLEASE READ THE FOLLOWING:    Why is smoking bad for me?  Smoking increases the risk of heart disease, lung disease, vascular disease, stroke, and cancer.     If you smoke, STOP!    If you would like more information on quitting smoking, please visit the [employer] website: www.[employer]/corporate/healthier-together/smoke   This link will provide additional resources including the QUIT line and the [employer] the [employer] support groups.     For more information:    Quit Now [place]  1-800-QUIT-NOW  https://[place].quitlogix.org/en-US/

## 2021-12-03 ENCOUNTER — PATIENT ROUNDING (BHMG ONLY) (OUTPATIENT)
Dept: GASTROENTEROLOGY | Facility: CLINIC | Age: 86
End: 2021-12-03

## 2021-12-03 ENCOUNTER — READMISSION MANAGEMENT (OUTPATIENT)
Dept: CALL CENTER | Facility: HOSPITAL | Age: 86
End: 2021-12-03

## 2021-12-03 NOTE — OUTREACH NOTE
Medical Week 3 Survey      Responses   [facility] patient discharged from? [place]   Does the patient have one of the following disease processes/diagnoses(primary or secondary)? Other   Week 3 attempt successful? Yes   Call start time 1059   Call end time 1104   General alerts for this patient Pacemaker placement   Meds reviewed with patient/caregiver? Yes   Is the patient having any side effects they believe may be caused by any medication additions or changes? No   Does the patient have all medications ordered at discharge? Yes   Is the patient taking all medications as directed (includes completed medication regime)? Yes   Does the patient have a primary care provider?  Yes   Comments regarding PCP PATIENT HAS SEEN HER PCP    Has the patient kept scheduled appointments due by today? Yes   What is the Home health agency?  [facility]   Has home health visited the patient within 72 hours of discharge? No   Home health comments PATIENT STATES SHE DID NOT NEED HOME HEALTH   Psychosocial issues? No   Did the patient receive a copy of their discharge instructions? Yes   Nursing interventions Reviewed instructions with patient   What is the patient's perception of their health status since discharge? Improving   Is the patient/caregiver able to teach back signs and symptoms related to disease process for when to call PCP? Yes   Is the patient/caregiver able to teach back signs and symptoms related to disease process for when to call 911? Yes   Is the patient/caregiver able to teach back the hierarchy of who to call/visit for symptoms/problems? PCP, Specialist, Home health nurse, Urgent Care, ED, 911 Yes   If the patient is a current smoker, are they able to teach back resources for cessation? Not a smoker   Week 3 Call Completed? Yes          Jodi Ragland LPN

## 2021-12-03 NOTE — PROGRESS NOTES
December 3, 2021    Hello, may I speak with Yudi Westbrook?    My name is Leslie      I am  with [facility]  [address], [address]  [facility] [address] [phone].    Before we get started may I verify your date of birth? 2/25/1932    I am calling to officially welcome you to our practice and ask about your recent visit. Is this a good time to talk? yes    Tell me about your visit with us. What things went well?  Everything was good.       We're always looking for ways to make our patients' experiences even better. Do you have recommendations on ways we may improve?  no    Overall were you satisfied with your first visit to our practice? yes       I appreciate you taking the time to speak with me today. Is there anything else I can do for you? no      Thank you, and have a great day.

## 2021-12-08 ENCOUNTER — CLINICAL SUPPORT NO REQUIREMENTS (OUTPATIENT)
Dept: CARDIOLOGY | Facility: CLINIC | Age: 86
End: 2021-12-08

## 2021-12-08 ENCOUNTER — DOCUMENTATION (OUTPATIENT)
Dept: CARDIOLOGY | Facility: CLINIC | Age: 86
End: 2021-12-08

## 2021-12-08 DIAGNOSIS — R00.1 BRADYCARDIA: ICD-10-CM

## 2021-12-08 PROCEDURE — 93279 PRGRMG DEV EVAL PM/LDLS PM: CPT | Performed by: PHYSICIAN ASSISTANT

## 2021-12-08 NOTE — PROGRESS NOTES
Patient needs to be scheduled with Dr Nunez per Dr Montilla and TIAN Lowry . Since Dr Montilla did not have her to follow-up with him in 2019 he goes to Dr Nunez because he put her pacemaker in.

## 2021-12-14 ENCOUNTER — READMISSION MANAGEMENT (OUTPATIENT)
Dept: CALL CENTER | Facility: HOSPITAL | Age: 86
End: 2021-12-14

## 2021-12-14 NOTE — OUTREACH NOTE
Medical Week 4 Survey      Responses   [facility] patient discharged from? [place]   Does the patient have one of the following disease processes/diagnoses(primary or secondary)? Other   Week 4 attempt successful? Yes   Call start time 1311   Call end time 1314   Meds reviewed with patient/caregiver? Yes   Is the patient having any side effects they believe may be caused by any medication additions or changes? No   Is the patient taking all medications as directed (includes completed medication regime)? Yes   Has the patient kept scheduled appointments due by today? Yes   Is the patient still receiving Home Health Services? N/A   Psychosocial issues? No   What is the patient's perception of their health status since discharge? Improving   Is the patient/caregiver able to teach back signs and symptoms related to disease process for when to call PCP? Yes   Is the patient/caregiver able to teach back signs and symptoms related to disease process for when to call 911? Yes   Is the patient/caregiver able to teach back the hierarchy of who to call/visit for symptoms/problems? PCP, Specialist, Home health nurse, Urgent Care, ED, 911 Yes   If the patient is a current smoker, are they able to teach back resources for cessation? Not a smoker   Week 4 Call Completed? Yes   Would the patient like one additional call? No   Graduated Yes   Is the patient interested in additional calls from an ambulatory ?  NOTE:  applies to high risk patients requiring additional follow-up. No   Did the patient feel the follow up calls were helpful during their recovery period? Yes   Was the number of calls appropriate? Yes          Jodi Ragland LPN

## 2021-12-29 NOTE — PROGRESS NOTES
Single Chamber Ventricular Pacemaker Evaluation Report  Clinic Interrogation    December 29, 2021    Primary Cardiologist: Mayra  : Guidant Model: Accolade MRI  Implant date: 11/10/21    Reason for evaluation: new implant follow up  Indication for pacemaker: sick sinus syndrome    Measurements  Ventricular sensing - R wave: 11.3 mV  Ventricular threshold: 0.6 V @ 0.4 ms  Ventricular lead impedance: 787 ohms      Diagnostic Data  Paced: 88 %  Other: Incision has healed well.  No new events noted.    Battery status: satisfactory           Final Parameters  Mode: VVIR     Lower rate: 60 bpm    Ventricular - Amplitude: 1.2 V Pulse width: 0.4 ms Sensitivity: 1.5 mV   Changes made: Programmed divya amplitude to auto  Conclusions: normal pacemaker function    Follow up: every 3 months via latitude, annually in office.

## 2022-01-12 ENCOUNTER — OFFICE VISIT (OUTPATIENT)
Dept: CARDIOLOGY | Facility: CLINIC | Age: 87
End: 2022-01-12

## 2022-01-12 VITALS
HEIGHT: 65 IN | BODY MASS INDEX: 23.32 KG/M2 | SYSTOLIC BLOOD PRESSURE: 130 MMHG | WEIGHT: 140 LBS | DIASTOLIC BLOOD PRESSURE: 80 MMHG | HEART RATE: 69 BPM

## 2022-01-12 DIAGNOSIS — E78.2 MIXED HYPERLIPIDEMIA: ICD-10-CM

## 2022-01-12 DIAGNOSIS — I48.21 PERMANENT ATRIAL FIBRILLATION: ICD-10-CM

## 2022-01-12 DIAGNOSIS — I10 PRIMARY HYPERTENSION: Primary | ICD-10-CM

## 2022-01-12 DIAGNOSIS — Z95.0 PRESENCE OF CARDIAC PACEMAKER: ICD-10-CM

## 2022-01-12 DIAGNOSIS — Z79.01 LONG TERM (CURRENT) USE OF ANTICOAGULANTS: ICD-10-CM

## 2022-01-12 DIAGNOSIS — I27.20 PULMONARY HYPERTENSION: ICD-10-CM

## 2022-01-12 PROCEDURE — 99024 POSTOP FOLLOW-UP VISIT: CPT | Performed by: INTERNAL MEDICINE

## 2022-01-12 NOTE — PROGRESS NOTES
Yudi Westbrook  [id number]  2/25/1932  89 y.o.  female    Referring Provider: Elia Preston MD    Reason for  Visit: short term office follow up after recent encounter   sick sinus syndrome s/p pacemaker   Surgical wound healed very well. No evidence of excessive bruising, pain, redness, swelling, discharge or foul odor noted  Patient declined any recent history of fever, chills, pain or warmth locally  Has had a small nodule 0.25 mm subcutaneous in the incision with no sign of infection    Subjective    Mild chronic exertional shortness of breath on exertion relieved with rest  No significant cough or wheezing    No palpitations  No associated chest pain  No significant pedal edema    No fever or chills  No significant expectoration    No hemoptysis  No presyncope or syncope    Tolerating current medications well with no untoward side effects   Compliant with prescribed medication regimen. Tries to adhere to cardiac diet.       History of present illness:  Yudi Westbrook is a 89 y.o. yo female with sick sinus syndrome s/p pacemaker  who presents today for   Chief Complaint   Patient presents with   • Follow-up   .    History  Past Medical History:   Diagnosis Date   • Atrial fibrillation (HCC)    • Bradycardia    • Cancer (HCC) 2002    Breast   • Hyperlipidemia    • Hypertension    • Osteoporosis    ,   Past Surgical History:   Procedure Laterality Date   • APPENDECTOMY     • CARDIAC ELECTROPHYSIOLOGY PROCEDURE N/A 11/10/2021    Procedure: Device Implant single chamber mri;  Surgeon: Charles Nunez MD;  Location: [facility] INVASIVE LOCATION;  Service: Cardiology;  Laterality: N/A;   • ENDOSCOPY N/A 11/9/2021    Esophageal ring, medium size HH, Chronic active gastritis   • HYSTERECTOMY     • MASTECTOMY Bilateral    ,   Family History   Problem Relation Age of Onset   • Hypertension Father    • Heart attack Brother    • Cancer Brother    • Colon cancer Neg Hx    • Colon polyps Neg Hx    ,   Social History      Tobacco Use   • Smoking status: Never Smoker   • Smokeless tobacco: Never Used   Substance Use Topics   • Alcohol use: No   • Drug use: No   ,     Medications  Current Outpatient Medications   Medication Sig Dispense Refill   • BIOTIN 5000 PO Take 1 tablet by mouth 1 (One) Time Per Week.     • Calcium Carb-Cholecalciferol (CALCIUM 600+D3) 600-200 MG-UNIT tablet Take 1 tablet by mouth Daily.     • famotidine (Pepcid) 20 MG tablet Take 1 tablet by mouth 2 (Two) Times a Day. 180 tablet 0   • fluticasone (FLONASE) 50 MCG/ACT nasal spray 2 sprays into the nostril(s) as directed by provider Daily As Needed for Allergies. prn      • lisinopril-hydrochlorothiazide (PRINZIDE,ZESTORETIC) 10-12.5 MG per tablet Take 1 tablet by mouth Daily.     • Magnesium 250 MG tablet Take 1 tablet by mouth Daily.     • Potassium 99 MG tablet Take 1 tablet by mouth Daily.     • simvastatin (ZOCOR) 20 MG tablet Take 20 mg by mouth Every Night.     • vitamin B-12 (CYANOCOBALAMIN) 1000 MCG tablet Take 1 tablet by mouth Daily.     • Vitamin E 100 units tablet Take 1 tablet by mouth Daily.     • warfarin (COUMADIN) 5 MG tablet Takes 7.5 mg daily except Saturday takes 5 mg     • alendronate (Fosamax) 70 MG tablet Take 70 mg by mouth Every 7 (Seven) Days.       No current facility-administered medications for this visit.       Allergies:  Morphine and Penicillins    Review of Systems  Review of Systems   Constitutional: Negative.   HENT: Negative.    Eyes: Negative.    Cardiovascular: Negative for chest pain, claudication, cyanosis, dyspnea on exertion, irregular heartbeat, leg swelling, near-syncope, orthopnea, palpitations, paroxysmal nocturnal dyspnea and syncope.   Respiratory: Negative.    Endocrine: Negative.    Hematologic/Lymphatic: Negative.    Skin: Negative.    Gastrointestinal: Negative for anorexia.   Genitourinary: Negative.    Neurological: Negative.    Psychiatric/Behavioral: Negative.        Objective     Physical Exam:  BP  "130/80   Pulse 69   Ht 165.1 cm (65\")   Wt 63.5 kg (140 lb)   BMI 23.30 kg/m²     Physical Exam  Constitutional:       Appearance: Normal appearance. She is well-developed.   HENT:      Head: Normocephalic.   Eyes:      General: Lids are normal.   Neck:      Vascular: Normal carotid pulses. No carotid bruit or JVD.      Trachea: No tracheal tenderness or tracheal deviation.   Cardiovascular:      Rate and Rhythm: Regular rhythm.      Pulses: Normal pulses.      Heart sounds: S1 normal and S2 normal. Murmur heard.    Systolic murmur is present with a grade of 2/6.      Pulmonary:      Effort: Pulmonary effort is normal.      Breath sounds: No stridor.   Abdominal:      General: There is no distension.      Palpations: Abdomen is soft.      Tenderness: There is no abdominal tenderness.   Musculoskeletal:      Cervical back: No edema.   Skin:     General: Skin is warm.   Neurological:      Mental Status: She is alert.      Cranial Nerves: No cranial nerve deficit.      Sensory: No sensory deficit.   Psychiatric:         Speech: Speech normal.         Behavior: Behavior normal.         Thought Content: Thought content normal.       Surgical wound healed very well. No evidence of excessive bruising, pain, redness, swelling, discharge or foul odor noted  Patient declined any recent history of fever, chills, pain or warmth locally       Results Review:    Results for orders placed during the hospital encounter of 11/07/21    Adult Transthoracic Echo Complete W/ Cont if Necessary Per Protocol    Interpretation Summary  · Left ventricular ejection fraction appears to be 56 - 60%. Left ventricular systolic function is normal.  · Left atrial volume is severely increased.  · Moderate tricuspid valve regurgitation is present.  · The right atrial cavity is moderately dilated.  · Moderate pulmonary hypertension is present.      "   ____________________________________________________________________________________________________________________________________________  Health maintenance and recommendations    Low salt/ HTN/ Heart healthy carbohydrate restricted cardiac diet   The patient is advised to reduce or avoid caffeine or other cardiac stimulants.   Minimize or avoid  NSAID-type medications      Monitor for any signs of bleeding including red or dark stools. Fall precautions.   Advised staying uptodate with immunizations per established standard guidelines.    Offered to give patient  a copy of my notes     Questions were encouraged, asked and answered to the patient's  understanding and satisfaction. Questions if any regarding current medications and side effects, need for refills and importance of compliance to medications stressed.    Reviewed available prior notes, consults, prior visits, laboratory findings, radiology and cardiology relevant reports. Updated chart as applicable. I have reviewed the patient's medical history in detail and updated the computerized patient record as relevant.      Updated patient regarding any new or relevant abnormalities on review of records or any new findings on physical exam. Mentioned to patient about purpose of visit and desirable health short and long term goals and objectives.    Primary to monitor CBC CMP Lipid panel and TSH as applicable    ___________________________________________________________________________________________________________________________________________   Procedures    Assessment/Plan   Diagnoses and all orders for this visit:    1. Primary hypertension (Primary)    2. Mixed hyperlipidemia    3. Permanent atrial fibrillation (HCC)    4. Presence of cardiac pacemaker    5. Long term (current) use of anticoagulants    6. Pulmonary hypertension (HCC)          Plan      Monitor RVSP and  left ventricular ejection fraction by serial echo in future     Overall doing  well no new cardiovascular symptoms and therefore no additional cardiac testing is required   If any interim issues arise will call me for further evaluation.     Patient expressed understanding  Encouraged and answered all questions   Discussed with the patient and all questioned fully answered. She will call me if any problems arise.   Discussed results of prior testing with patient : echo   as well electrocardiogram from today       Monitor cardiac rhythm device function regularly per established schedule or PRN      I support the patient's decision to take the Covid -19 vaccine   Had required complete course   No major issues   Now fully immunized    Had booster too      Check BP and heart rates twice daily initially till blood pressures and heart rates under good control and then at least 3x / week,   If blood pressures continue to be well-controlled then can check week a month  at home and bring a recording for review next visit  If BP >130/85 or < 100/60 persistently over 3 reading 30 mins apart or if heart rates persistently above 100 bpm or less than 55 bpm call sooner for evaluation and advise     Follow up with Alivia OVERTON , August OVERTON  or myself          Return in about 6 months (around 7/12/2022).

## 2022-01-25 ENCOUNTER — TELEPHONE (OUTPATIENT)
Dept: CARDIOLOGY | Facility: CLINIC | Age: 87
End: 2022-01-25

## 2022-01-25 NOTE — TELEPHONE ENCOUNTER
RECEIVED A CALL FROM PATIENT STATING THAT SHE NOTICED A SORE RAISED AREA ON HER PACEMAKER SITE. SHE STATES THAT IT SEEMED TO HAVE 'A WHITE HEAD'. AFTER SHE SHOWERED, THE SORENESS DECREASED, BUT THE BUMP IS STILL THERE. PT WAS ADVISED TO STOP BY THE OFFICE TOMORROW AT 8:30 SO DR PATTERSON CAN LOOK AT IT.    PT AGREEABLE

## 2022-01-26 ENCOUNTER — OFFICE VISIT (OUTPATIENT)
Dept: CARDIOLOGY | Facility: CLINIC | Age: 87
End: 2022-01-26

## 2022-01-26 VITALS
SYSTOLIC BLOOD PRESSURE: 152 MMHG | OXYGEN SATURATION: 97 % | HEIGHT: 66 IN | BODY MASS INDEX: 22.18 KG/M2 | HEART RATE: 60 BPM | WEIGHT: 138 LBS | DIASTOLIC BLOOD PRESSURE: 78 MMHG

## 2022-01-26 DIAGNOSIS — R09.82 POST-NASAL DISCHARGE: ICD-10-CM

## 2022-01-26 DIAGNOSIS — Z95.0 PRESENCE OF CARDIAC PACEMAKER: ICD-10-CM

## 2022-01-26 DIAGNOSIS — I27.20 PULMONARY HYPERTENSION: ICD-10-CM

## 2022-01-26 DIAGNOSIS — I10 PRIMARY HYPERTENSION: ICD-10-CM

## 2022-01-26 DIAGNOSIS — I48.21 PERMANENT ATRIAL FIBRILLATION: Primary | ICD-10-CM

## 2022-01-26 DIAGNOSIS — L03.313 CELLULITIS OF CHEST WALL: ICD-10-CM

## 2022-01-26 DIAGNOSIS — E78.2 MIXED HYPERLIPIDEMIA: ICD-10-CM

## 2022-01-26 PROBLEM — L03.319 CELLULITIS OF TRUNK: Status: ACTIVE | Noted: 2022-01-26

## 2022-01-26 PROCEDURE — 99024 POSTOP FOLLOW-UP VISIT: CPT | Performed by: INTERNAL MEDICINE

## 2022-01-26 RX ORDER — CLINDAMYCIN HYDROCHLORIDE 300 MG/1
300 CAPSULE ORAL 3 TIMES DAILY
Qty: 42 CAPSULE | Refills: 1 | Status: SHIPPED | OUTPATIENT
Start: 2022-01-26 | End: 2022-04-07

## 2022-01-26 NOTE — PROGRESS NOTES
Yudi Westbrook  [id number]  2/25/1932  89 y.o.  female    Referring Provider: Elia Preston MD    Reason for  Visit: short term office follow up after recent encounter   sick sinus syndrome s/p pacemaker   Has had a small nodule 0.25 mm subcutaneous in the incision with no sign of infection prior visit   Now says got red and possibly had some drainage   Mild chronic exertional shortness of breath on exertion relieved with rest  No significant cough or wheezing    No palpitations  No associated chest pain  No significant pedal edema    No fever or chills  No significant expectoration    No hemoptysis  No presyncope or syncope    Tolerating current medications well with no untoward side effects   Compliant with prescribed medication regimen. Tries to adhere to cardiac diet.     Constant post nasal discharge and cannot sleep     History of present illness:  Yudi Westbrook is a 89 y.o. yo female with sick sinus syndrome s/p pacemaker  who presents today for   Chief Complaint   Patient presents with   • Hypertension     SITE CHECK- no issues other then a little sore on site. states she can feel something poking when she lays on her left side.    .    History  Past Medical History:   Diagnosis Date   • Atrial fibrillation (HCC)    • Bradycardia    • Cancer (HCC) 2002    Breast   • Hyperlipidemia    • Hypertension    • Osteoporosis    ,   Past Surgical History:   Procedure Laterality Date   • APPENDECTOMY     • CARDIAC ELECTROPHYSIOLOGY PROCEDURE N/A 11/10/2021    Procedure: Device Implant single chamber mri;  Surgeon: Charles Nunez MD;  Location: [facility] INVASIVE LOCATION;  Service: Cardiology;  Laterality: N/A;   • ENDOSCOPY N/A 11/9/2021    Esophageal ring, medium size HH, Chronic active gastritis   • HYSTERECTOMY     • INSERT / REPLACE / REMOVE PACEMAKER     • MASTECTOMY Bilateral    ,   Family History   Problem Relation Age of Onset   • Hypertension Father    • Heart attack Brother    • Cancer Brother     • Colon cancer Neg Hx    • Colon polyps Neg Hx    ,   Social History     Tobacco Use   • Smoking status: Never Smoker   • Smokeless tobacco: Never Used   Vaping Use   • Vaping Use: Never used   Substance Use Topics   • Alcohol use: No   • Drug use: No   ,     Medications  Current Outpatient Medications   Medication Sig Dispense Refill   • alendronate (Fosamax) 70 MG tablet Take 70 mg by mouth Every 7 (Seven) Days.     • BIOTIN 5000 PO Take 1 tablet by mouth 1 (One) Time Per Week.     • Calcium Carb-Cholecalciferol (CALCIUM 600+D3) 600-200 MG-UNIT tablet Take 1 tablet by mouth Daily.     • famotidine (Pepcid) 20 MG tablet Take 1 tablet by mouth 2 (Two) Times a Day. 180 tablet 0   • fluticasone (FLONASE) 50 MCG/ACT nasal spray 2 sprays into the nostril(s) as directed by provider Daily As Needed for Allergies. prn      • lisinopril-hydrochlorothiazide (PRINZIDE,ZESTORETIC) 10-12.5 MG per tablet Take 1 tablet by mouth Daily.     • Magnesium 250 MG tablet Take 1 tablet by mouth Daily.     • Potassium 99 MG tablet Take 1 tablet by mouth Daily.     • simvastatin (ZOCOR) 20 MG tablet Take 20 mg by mouth Every Night.     • vitamin B-12 (CYANOCOBALAMIN) 1000 MCG tablet Take 1 tablet by mouth Daily.     • Vitamin E 100 units tablet Take 1 tablet by mouth Daily.     • warfarin (COUMADIN) 5 MG tablet Takes 7.5 mg daily except Saturday takes 5 mg     • clindamycin (Cleocin) 300 MG capsule Take 1 capsule by mouth 3 (Three) Times a Day. 42 capsule 1     No current facility-administered medications for this visit.       Allergies:  Morphine and Penicillins    Review of Systems  Review of Systems   Constitutional: Negative.   HENT: Negative.    Eyes: Negative.    Cardiovascular: Negative for chest pain, claudication, cyanosis, dyspnea on exertion, irregular heartbeat, leg swelling, near-syncope, orthopnea, palpitations, paroxysmal nocturnal dyspnea and syncope.   Respiratory: Negative.    Endocrine: Negative.   "  Hematologic/Lymphatic: Negative.    Skin: Negative.    Gastrointestinal: Negative for anorexia.   Genitourinary: Negative.    Neurological: Negative.    Psychiatric/Behavioral: Negative.        Objective     Physical Exam:  /78   Pulse 60   Ht 167.6 cm (66\")   Wt 62.6 kg (138 lb)   SpO2 97%   BMI 22.27 kg/m²     Physical Exam  Constitutional:       Appearance: Normal appearance. She is well-developed.   HENT:      Head: Normocephalic.   Eyes:      General: Lids are normal.   Neck:      Vascular: Normal carotid pulses. No carotid bruit or JVD.      Trachea: No tracheal tenderness or tracheal deviation.   Cardiovascular:      Rate and Rhythm: Regular rhythm.      Pulses: Normal pulses.      Heart sounds: S1 normal and S2 normal. Murmur heard.    Systolic murmur is present with a grade of 2/6.      Pulmonary:      Effort: Pulmonary effort is normal.      Breath sounds: No stridor.   Abdominal:      General: There is no distension.      Palpations: Abdomen is soft.      Tenderness: There is no abdominal tenderness.   Musculoskeletal:      Cervical back: No edema.   Skin:     General: Skin is warm.   Neurological:      Mental Status: She is alert.      Cranial Nerves: No cranial nerve deficit.      Sensory: No sensory deficit.   Psychiatric:         Speech: Speech normal.         Behavior: Behavior normal.         Thought Content: Thought content normal.       5 mm are of redness lateral aspect of pacer incision site   No drainage   Dry     Results Review:    Results for orders placed during the hospital encounter of 11/07/21    Adult Transthoracic Echo Complete W/ Cont if Necessary Per Protocol    Interpretation Summary  · Left ventricular ejection fraction appears to be 56 - 60%. Left ventricular systolic function is normal.  · Left atrial volume is severely increased.  · Moderate tricuspid valve regurgitation is present.  · The right atrial cavity is moderately dilated.  · Moderate pulmonary hypertension " is present.        ____________________________________________________________________________________________________________________________________________  Health maintenance and recommendations    Low salt/ HTN/ Heart healthy carbohydrate restricted cardiac diet   The patient is advised to reduce or avoid caffeine or other cardiac stimulants.   Minimize or avoid  NSAID-type medications      Monitor for any signs of bleeding including red or dark stools. Fall precautions.   Advised staying uptodate with immunizations per established standard guidelines.    Offered to give patient  a copy of my notes     Questions were encouraged, asked and answered to the patient's  understanding and satisfaction. Questions if any regarding current medications and side effects, need for refills and importance of compliance to medications stressed.    Reviewed available prior notes, consults, prior visits, laboratory findings, radiology and cardiology relevant reports. Updated chart as applicable. I have reviewed the patient's medical history in detail and updated the computerized patient record as relevant.      Updated patient regarding any new or relevant abnormalities on review of records or any new findings on physical exam. Mentioned to patient about purpose of visit and desirable health short and long term goals and objectives.    Primary to monitor CBC CMP Lipid panel and TSH as applicable    ___________________________________________________________________________________________________________________________________________   Procedures    Assessment/Plan   Diagnoses and all orders for this visit:    1. Permanent atrial fibrillation (HCC) (Primary)    2. Presence of cardiac pacemaker    3. Pulmonary hypertension (HCC)    4. Mixed hyperlipidemia    5. Primary hypertension    6. Cellulitis of chest wall    7. Post-nasal discharge  -     Ambulatory Referral to ENT (Otolaryngology)    Other orders  -     clindamycin  (Cleocin) 300 MG capsule; Take 1 capsule by mouth 3 (Three) Times a Day.  Dispense: 42 capsule; Refill: 1          Plan    Will start on antibiotics and see her back in 1 week  Requested Prescriptions     Signed Prescriptions Disp Refills   • clindamycin (Cleocin) 300 MG capsule 42 capsule 1     Sig: Take 1 capsule by mouth 3 (Three) Times a Day.         If gets chills more drainage etc to call sooner and I will see her back   May need IV antibiotics   Looks like stitch infection for now     As started on Clindamycin   Watch for any bloody stools, diarrhea or abdominal pain   If this occurs stop Clindamycin immediately and call us   The current medical regimen is effective;  continue present plan and medications otherwise     Orders Placed This Encounter   Procedures   • Ambulatory Referral to ENT (Otolaryngology)     Referral Priority:   Routine     Referral Type:   Consultation     Referral Reason:   Specialty Services Required     Requested Specialty:   Otolaryngology     Number of Visits Requested:   1            Return in about 5 days (around 1/31/2022).

## 2022-02-01 ENCOUNTER — OFFICE VISIT (OUTPATIENT)
Dept: CARDIOLOGY | Facility: CLINIC | Age: 87
End: 2022-02-01

## 2022-02-01 VITALS
OXYGEN SATURATION: 98 % | WEIGHT: 140 LBS | HEIGHT: 66 IN | HEART RATE: 62 BPM | DIASTOLIC BLOOD PRESSURE: 68 MMHG | SYSTOLIC BLOOD PRESSURE: 130 MMHG | BODY MASS INDEX: 22.5 KG/M2

## 2022-02-01 DIAGNOSIS — R00.1 BRADYCARDIA: ICD-10-CM

## 2022-02-01 DIAGNOSIS — I27.20 PULMONARY HYPERTENSION: ICD-10-CM

## 2022-02-01 DIAGNOSIS — Z95.0 PRESENCE OF CARDIAC PACEMAKER: ICD-10-CM

## 2022-02-01 DIAGNOSIS — R09.82 POST-NASAL DISCHARGE: ICD-10-CM

## 2022-02-01 DIAGNOSIS — I48.21 PERMANENT ATRIAL FIBRILLATION: ICD-10-CM

## 2022-02-01 DIAGNOSIS — I10 PRIMARY HYPERTENSION: Primary | ICD-10-CM

## 2022-02-01 DIAGNOSIS — Z79.01 LONG TERM (CURRENT) USE OF ANTICOAGULANTS: ICD-10-CM

## 2022-02-01 PROCEDURE — 99024 POSTOP FOLLOW-UP VISIT: CPT | Performed by: INTERNAL MEDICINE

## 2022-02-01 NOTE — PROGRESS NOTES
Yudi Westbrook  [id number]  2/25/1932  89 y.o.  female    Referring Provider: Elia Preston MD    Reason for  Visit: short term office follow up after recent encounter   sick sinus syndrome s/p pacemaker   Has had a small nodule 0.25 mm subcutaneous in the incision with no sign of infection prior visit   Had got red and possibly had some drainage now after starting antibiotics almost resolved   Mild chronic exertional shortness of breath on exertion relieved with rest  No significant cough or wheezing    No palpitations  No associated chest pain  No significant pedal edema    No fever or chills  No significant expectoration    No hemoptysis  No presyncope or syncope    Tolerating current medications well with no untoward side effects   Compliant with prescribed medication regimen. Tries to adhere to cardiac diet.     Constant post nasal discharge and cannot sleep   Wants ENT referral    History of present illness:  Yudi Westbrook is a 89 y.o. yo female with sick sinus syndrome s/p pacemaker  who presents today for   Chief Complaint   Patient presents with   • Pacemaker Site Check     1 wk f/u - recent chest pain   .    History  Past Medical History:   Diagnosis Date   • Atrial fibrillation (HCC)    • Bradycardia    • Cancer (HCC) 2002    Breast   • Hyperlipidemia    • Hypertension    • Osteoporosis    ,   Past Surgical History:   Procedure Laterality Date   • APPENDECTOMY     • CARDIAC ELECTROPHYSIOLOGY PROCEDURE N/A 11/10/2021    Procedure: Device Implant single chamber mri;  Surgeon: Charles Nunez MD;  Location: [facility] INVASIVE LOCATION;  Service: Cardiology;  Laterality: N/A;   • ENDOSCOPY N/A 11/9/2021    Esophageal ring, medium size HH, Chronic active gastritis   • HYSTERECTOMY     • INSERT / REPLACE / REMOVE PACEMAKER     • MASTECTOMY Bilateral    ,   Family History   Problem Relation Age of Onset   • Hypertension Father    • Heart attack Brother    • Cancer Brother    • Colon cancer Neg Hx     • Colon polyps Neg Hx    ,   Social History     Tobacco Use   • Smoking status: Never Smoker   • Smokeless tobacco: Never Used   Vaping Use   • Vaping Use: Never used   Substance Use Topics   • Alcohol use: No   • Drug use: No   ,     Medications  Current Outpatient Medications   Medication Sig Dispense Refill   • alendronate (Fosamax) 70 MG tablet Take 70 mg by mouth Every 7 (Seven) Days.     • BIOTIN 5000 PO Take 1 tablet by mouth 1 (One) Time Per Week.     • Calcium Carb-Cholecalciferol (CALCIUM 600+D3) 600-200 MG-UNIT tablet Take 1 tablet by mouth Daily.     • clindamycin (Cleocin) 300 MG capsule Take 1 capsule by mouth 3 (Three) Times a Day. 42 capsule 1   • famotidine (Pepcid) 20 MG tablet Take 1 tablet by mouth 2 (Two) Times a Day. 180 tablet 0   • fluticasone (FLONASE) 50 MCG/ACT nasal spray 2 sprays into the nostril(s) as directed by provider Daily As Needed for Allergies. prn      • lisinopril-hydrochlorothiazide (PRINZIDE,ZESTORETIC) 10-12.5 MG per tablet Take 1 tablet by mouth Daily.     • Magnesium 250 MG tablet Take 1 tablet by mouth Daily.     • Potassium 99 MG tablet Take 1 tablet by mouth Daily.     • simvastatin (ZOCOR) 20 MG tablet Take 20 mg by mouth Every Night.     • vitamin B-12 (CYANOCOBALAMIN) 1000 MCG tablet Take 1 tablet by mouth Daily.     • Vitamin E 100 units tablet Take 1 tablet by mouth Daily.     • warfarin (COUMADIN) 5 MG tablet Takes 7.5 mg daily except Saturday takes 5 mg       No current facility-administered medications for this visit.       Allergies:  Morphine and Penicillins    Review of Systems  Review of Systems   Constitutional: Negative.   HENT: Negative.    Eyes: Negative.    Cardiovascular: Negative for chest pain, claudication, cyanosis, dyspnea on exertion, irregular heartbeat, leg swelling, near-syncope, orthopnea, palpitations, paroxysmal nocturnal dyspnea and syncope.   Respiratory: Negative.    Endocrine: Negative.    Hematologic/Lymphatic: Negative.    Skin:  "Negative.    Gastrointestinal: Negative for anorexia.   Genitourinary: Negative.    Neurological: Negative.    Psychiatric/Behavioral: Negative.        Objective     Physical Exam:  /68   Pulse 62   Ht 167.6 cm (66\")   Wt 63.5 kg (140 lb)   SpO2 98%   BMI 22.60 kg/m²     Physical Exam  Constitutional:       Appearance: Normal appearance. She is well-developed.   HENT:      Head: Normocephalic.   Eyes:      General: Lids are normal.   Neck:      Vascular: Normal carotid pulses. No carotid bruit or JVD.      Trachea: No tracheal tenderness or tracheal deviation.   Cardiovascular:      Rate and Rhythm: Regular rhythm.      Pulses: Normal pulses.      Heart sounds: S1 normal and S2 normal. Murmur heard.    Systolic murmur is present with a grade of 2/6.      Pulmonary:      Effort: Pulmonary effort is normal.      Breath sounds: No stridor.   Abdominal:      General: There is no distension.      Palpations: Abdomen is soft.      Tenderness: There is no abdominal tenderness.   Musculoskeletal:      Cervical back: No edema.   Skin:     General: Skin is warm.   Neurological:      Mental Status: She is alert.      Cranial Nerves: No cranial nerve deficit.      Sensory: No sensory deficit.   Psychiatric:         Speech: Speech normal.         Behavior: Behavior normal.         Thought Content: Thought content normal.       5 mm are of redness lateral aspect of pacer incision site last visit now scabbed over and looks much better   No drainage   Dry     Results Review:    Results for orders placed during the hospital encounter of 11/07/21    Adult Transthoracic Echo Complete W/ Cont if Necessary Per Protocol    Interpretation Summary  · Left ventricular ejection fraction appears to be 56 - 60%. Left ventricular systolic function is normal.  · Left atrial volume is severely increased.  · Moderate tricuspid valve regurgitation is present.  · The right atrial cavity is moderately dilated.  · Moderate pulmonary " hypertension is present.        ____________________________________________________________________________________________________________________________________________  Health maintenance and recommendations    Low salt/ HTN/ Heart healthy carbohydrate restricted cardiac diet   The patient is advised to reduce or avoid caffeine or other cardiac stimulants.   Minimize or avoid  NSAID-type medications      Monitor for any signs of bleeding including red or dark stools. Fall precautions.   Advised staying uptodate with immunizations per established standard guidelines.    Offered to give patient  a copy of my notes     Questions were encouraged, asked and answered to the patient's  understanding and satisfaction. Questions if any regarding current medications and side effects, need for refills and importance of compliance to medications stressed.    Reviewed available prior notes, consults, prior visits, laboratory findings, radiology and cardiology relevant reports. Updated chart as applicable. I have reviewed the patient's medical history in detail and updated the computerized patient record as relevant.      Updated patient regarding any new or relevant abnormalities on review of records or any new findings on physical exam. Mentioned to patient about purpose of visit and desirable health short and long term goals and objectives.    Primary to monitor CBC CMP Lipid panel and TSH as applicable    ___________________________________________________________________________________________________________________________________________   Procedures    Assessment/Plan   Diagnoses and all orders for this visit:    1. Primary hypertension (Primary)    2. Permanent atrial fibrillation (HCC)    3. Bradycardia    4. Long term (current) use of anticoagulants    5. Presence of cardiac pacemaker    6. Pulmonary hypertension (HCC)    7. Post-nasal discharge  -     Ambulatory Referral to ENT  (Otolaryngology)          Plan      Orders Placed This Encounter   Procedures   • Ambulatory Referral to ENT (Otolaryngology)     Referral Priority:   Routine     Referral Type:   Consultation     Referral Reason:   Specialty Services Required     Requested Specialty:   Otolaryngology     Number of Visits Requested:   1        Complete antibiotics and see her back in 1 week or so     If gets chills more drainage etc to call sooner and I will see her back   Looks like stitch infection for now and healing well    As started on Clindamycin   Watch for any bloody stools, diarrhea or abdominal pain   If this occurs stop Clindamycin immediately and call us   The current medical regimen is effective;  continue present plan and medications otherwise              Return in about 8 days (around 2/9/2022).

## 2022-02-09 ENCOUNTER — OFFICE VISIT (OUTPATIENT)
Dept: CARDIOLOGY | Facility: CLINIC | Age: 87
End: 2022-02-09

## 2022-02-09 VITALS
HEART RATE: 63 BPM | WEIGHT: 143 LBS | BODY MASS INDEX: 22.98 KG/M2 | DIASTOLIC BLOOD PRESSURE: 70 MMHG | HEIGHT: 66 IN | OXYGEN SATURATION: 98 % | SYSTOLIC BLOOD PRESSURE: 140 MMHG

## 2022-02-09 DIAGNOSIS — I10 PRIMARY HYPERTENSION: ICD-10-CM

## 2022-02-09 DIAGNOSIS — E78.2 MIXED HYPERLIPIDEMIA: ICD-10-CM

## 2022-02-09 DIAGNOSIS — Z95.0 PRESENCE OF CARDIAC PACEMAKER: ICD-10-CM

## 2022-02-09 DIAGNOSIS — I27.20 PULMONARY HYPERTENSION: ICD-10-CM

## 2022-02-09 DIAGNOSIS — L03.313 CELLULITIS OF CHEST WALL: Primary | ICD-10-CM

## 2022-02-09 PROCEDURE — 99213 OFFICE O/P EST LOW 20 MIN: CPT | Performed by: INTERNAL MEDICINE

## 2022-02-09 NOTE — PROGRESS NOTES
Yudi Westbrook  [id number]  2/25/1932  89 y.o.  female    Referring Provider: Elia Preston MD    Reason for  Visit: short term office follow up after recent encounter   sick sinus syndrome s/p pacemaker   Has had a small nodule 0.25 mm subcutaneous in the incision with no sign of infection prior visit   Had got red and possibly had some drainage now after starting antibiotics almost resolved and now with a small scab   Mild chronic exertional shortness of breath on exertion relieved with rest  No significant cough or wheezing    No palpitations  No associated chest pain  No significant pedal edema    No fever or chills  No significant expectoration    No hemoptysis  No presyncope or syncope    Tolerating current medications well with no untoward side effects   Compliant with prescribed medication regimen. Tries to adhere to cardiac diet.     Constant post nasal discharge and cannot sleep   Wants ENT referral    History of present illness:  Yudi Westbrook is a 89 y.o. yo female with sick sinus syndrome s/p pacemaker  who presents today for   Chief Complaint   Patient presents with   • Hypertension     1wk- SITE CHECK- no issues at this time.    .    History  Past Medical History:   Diagnosis Date   • Atrial fibrillation (HCC)    • Bradycardia    • Cancer (HCC) 2002    Breast   • Hyperlipidemia    • Hypertension    • Osteoporosis    ,   Past Surgical History:   Procedure Laterality Date   • APPENDECTOMY     • CARDIAC ELECTROPHYSIOLOGY PROCEDURE N/A 11/10/2021    Procedure: Device Implant single chamber mri;  Surgeon: Charles Nunez MD;  Location: [facility] INVASIVE LOCATION;  Service: Cardiology;  Laterality: N/A;   • ENDOSCOPY N/A 11/9/2021    Esophageal ring, medium size HH, Chronic active gastritis   • HYSTERECTOMY     • INSERT / REPLACE / REMOVE PACEMAKER     • MASTECTOMY Bilateral    ,   Family History   Problem Relation Age of Onset   • Hypertension Father    • Heart attack Brother    • Cancer  Brother    • Colon cancer Neg Hx    • Colon polyps Neg Hx    ,   Social History     Tobacco Use   • Smoking status: Never Smoker   • Smokeless tobacco: Never Used   Vaping Use   • Vaping Use: Never used   Substance Use Topics   • Alcohol use: No   • Drug use: No   ,     Medications  Current Outpatient Medications   Medication Sig Dispense Refill   • alendronate (Fosamax) 70 MG tablet Take 70 mg by mouth Every 7 (Seven) Days.     • BIOTIN 5000 PO Take 1 tablet by mouth 1 (One) Time Per Week.     • Calcium Carb-Cholecalciferol (CALCIUM 600+D3) 600-200 MG-UNIT tablet Take 1 tablet by mouth Daily.     • clindamycin (Cleocin) 300 MG capsule Take 1 capsule by mouth 3 (Three) Times a Day. 42 capsule 1   • famotidine (Pepcid) 20 MG tablet Take 1 tablet by mouth 2 (Two) Times a Day. 180 tablet 0   • fluticasone (FLONASE) 50 MCG/ACT nasal spray 2 sprays into the nostril(s) as directed by provider Daily As Needed for Allergies. prn      • lisinopril-hydrochlorothiazide (PRINZIDE,ZESTORETIC) 10-12.5 MG per tablet Take 1 tablet by mouth Daily.     • Magnesium 250 MG tablet Take 1 tablet by mouth Daily.     • Potassium 99 MG tablet Take 1 tablet by mouth Daily.     • simvastatin (ZOCOR) 20 MG tablet Take 20 mg by mouth Every Night.     • vitamin B-12 (CYANOCOBALAMIN) 1000 MCG tablet Take 1 tablet by mouth Daily.     • Vitamin E 100 units tablet Take 1 tablet by mouth Daily.     • warfarin (COUMADIN) 5 MG tablet Takes 7.5 mg daily except Saturday takes 5 mg       No current facility-administered medications for this visit.       Allergies:  Morphine and Penicillins    Review of Systems  Review of Systems   Constitutional: Negative.   HENT: Negative.    Eyes: Negative.    Cardiovascular: Negative for chest pain, claudication, cyanosis, dyspnea on exertion, irregular heartbeat, leg swelling, near-syncope, orthopnea, palpitations, paroxysmal nocturnal dyspnea and syncope.   Respiratory: Negative.    Endocrine: Negative.   "  Hematologic/Lymphatic: Negative.    Skin: Negative.    Gastrointestinal: Negative for anorexia.   Genitourinary: Negative.    Neurological: Negative.    Psychiatric/Behavioral: Negative.        Objective     Physical Exam:  /70   Pulse 63   Ht 167.6 cm (66\")   Wt 64.9 kg (143 lb)   SpO2 98%   BMI 23.08 kg/m²     Physical Exam  Constitutional:       Appearance: Normal appearance. She is well-developed.   HENT:      Head: Normocephalic.   Eyes:      General: Lids are normal.   Neck:      Vascular: Normal carotid pulses. No carotid bruit or JVD.      Trachea: No tracheal tenderness or tracheal deviation.   Cardiovascular:      Rate and Rhythm: Regular rhythm.      Pulses: Normal pulses.      Heart sounds: S1 normal and S2 normal. Murmur heard.    Systolic murmur is present with a grade of 2/6.      Pulmonary:      Effort: Pulmonary effort is normal.      Breath sounds: No stridor.   Abdominal:      General: There is no distension.      Palpations: Abdomen is soft.      Tenderness: There is no abdominal tenderness.   Musculoskeletal:      Cervical back: No edema.   Skin:     General: Skin is warm.   Neurological:      Mental Status: She is alert.      Cranial Nerves: No cranial nerve deficit.      Sensory: No sensory deficit.   Psychiatric:         Speech: Speech normal.         Behavior: Behavior normal.         Thought Content: Thought content normal.       5 mm are of redness lateral aspect of pacer incision site last visit now scabbed over and looks much better   No drainage   Dry     Results Review:    Results for orders placed during the hospital encounter of 11/07/21    Adult Transthoracic Echo Complete W/ Cont if Necessary Per Protocol    Interpretation Summary  · Left ventricular ejection fraction appears to be 56 - 60%. Left ventricular systolic function is normal.  · Left atrial volume is severely increased.  · Moderate tricuspid valve regurgitation is present.  · The right atrial cavity is " moderately dilated.  · Moderate pulmonary hypertension is present.        ____________________________________________________________________________________________________________________________________________  Health maintenance and recommendations    Low salt/ HTN/ Heart healthy carbohydrate restricted cardiac diet   The patient is advised to reduce or avoid caffeine or other cardiac stimulants.   Minimize or avoid  NSAID-type medications      Monitor for any signs of bleeding including red or dark stools. Fall precautions.   Advised staying uptodate with immunizations per established standard guidelines.    Offered to give patient  a copy of my notes     Questions were encouraged, asked and answered to the patient's  understanding and satisfaction. Questions if any regarding current medications and side effects, need for refills and importance of compliance to medications stressed.    Reviewed available prior notes, consults, prior visits, laboratory findings, radiology and cardiology relevant reports. Updated chart as applicable. I have reviewed the patient's medical history in detail and updated the computerized patient record as relevant.      Updated patient regarding any new or relevant abnormalities on review of records or any new findings on physical exam. Mentioned to patient about purpose of visit and desirable health short and long term goals and objectives.    Primary to monitor CBC CMP Lipid panel and TSH as applicable    ___________________________________________________________________________________________________________________________________________   Procedures    Assessment/Plan   Diagnoses and all orders for this visit:    1. Cellulitis of chest wall (Primary)    2. Mixed hyperlipidemia    3. Primary hypertension    4. Presence of cardiac pacemaker    5. Pulmonary hypertension (HCC)          Plan    Complete antibiotics   Do not pick on the scab     Overall doing well no new  cardiovascular symptoms and therefore no additional cardiac testing is required   If any interim issues arise will call me for further evaluation.     Monitor cardiac rhythm device function regularly per established schedule or PRN       If gets chills more drainage etc to call sooner and I will see her back   Looks like stitch infection for now and healing well     Watch for any bloody stools, diarrhea or abdominal pain   If this occurs stop Clindamycin immediately and call us   The current medical regimen is effective;  continue present plan and medications otherwise     See me in 1 month   If bleeding redness or discharge call me immediately           Return in about 4 weeks (around 3/9/2022).

## 2022-03-07 ENCOUNTER — OFFICE VISIT (OUTPATIENT)
Dept: OTOLARYNGOLOGY | Facility: CLINIC | Age: 87
End: 2022-03-07

## 2022-03-07 VITALS
WEIGHT: 138 LBS | HEART RATE: 102 BPM | HEIGHT: 66 IN | TEMPERATURE: 97.9 F | BODY MASS INDEX: 22.18 KG/M2 | DIASTOLIC BLOOD PRESSURE: 75 MMHG | SYSTOLIC BLOOD PRESSURE: 86 MMHG

## 2022-03-07 DIAGNOSIS — R68.2 ORAL DRYNESS: ICD-10-CM

## 2022-03-07 DIAGNOSIS — J39.2 THROAT DRYNESS: Primary | ICD-10-CM

## 2022-03-07 PROCEDURE — 99214 OFFICE O/P EST MOD 30 MIN: CPT | Performed by: EMERGENCY MEDICINE

## 2022-03-07 RX ORDER — WARFARIN SODIUM 2.5 MG/1
TABLET ORAL
COMMUNITY
Start: 2022-02-05

## 2022-03-07 RX ORDER — SIMVASTATIN 20 MG/5ML
SUSPENSION ORAL EVERY 24 HOURS
COMMUNITY

## 2022-03-07 RX ORDER — GUAIFENESIN 600 MG/1
600 TABLET, EXTENDED RELEASE ORAL EVERY 12 HOURS SCHEDULED
Qty: 60 TABLET | Refills: 3 | Status: SHIPPED | OUTPATIENT
Start: 2022-03-07 | End: 2022-04-07

## 2022-03-07 NOTE — PROGRESS NOTES
TIAN Kraus ENT [facility]  [address], [address]  [facility] [address]  Fax [phone]  Phone [phone]      Visit Type: NEW PATIENT   Chief Complaint   Patient presents with   • Sinus Problem     Drainage, couging        HPI  She complains of nasal drainage, cough, throat clearing and excessive mucous. The symptoms are not localized to a particular location. The patient has had mild to moderate symptoms. The symptoms have been relatively constant for the last 2 years. There have been no identified factors that aggravate the symptoms. There have been no factors that have improved the symptoms. She drinks mostly coffee during the day and drinks minimal water.  She has been allergy tested in the past.  She has been using flonase intermittently, this has resulted in some bloody drainage.  She is anticoagulated on warfarin.    Past Medical History:   Diagnosis Date   • Atrial fibrillation (HCC)    • Bradycardia    • Cancer (HCC) 2002    Breast   • Hyperlipidemia    • Hypertension    • Osteoporosis        Past Surgical History:   Procedure Laterality Date   • APPENDECTOMY     • CARDIAC ELECTROPHYSIOLOGY PROCEDURE N/A 11/10/2021    Procedure: Device Implant single chamber mri;  Surgeon: Charles Nunez MD;  Location: [facility] INVASIVE LOCATION;  Service: Cardiology;  Laterality: N/A;   • ENDOSCOPY N/A 11/9/2021    Esophageal ring, medium size HH, Chronic active gastritis   • HYSTERECTOMY     • INSERT / REPLACE / REMOVE PACEMAKER     • MASTECTOMY Bilateral        Family History: Her family history includes Cancer in her brother; Heart attack in her brother; Hypertension in her father.     Social History: She  reports that she has never smoked. She has never used smokeless tobacco. She reports that she does not drink alcohol and does not use drugs.    Home Medications:  Biotin, Calcium Carb-Cholecalciferol, Magnesium, Potassium,  Simvastatin, Vitamin C, Vitamin E, alendronate, clindamycin, famotidine, fluticasone, guaiFENesin, (lisinopril 10 MG tablet 10 mg, hydroCHLOROthiazide 12.5 MG 12.5 mg), vitamin B-12, and warfarin    Allergies:  She is allergic to morphine and penicillins.       Vital Signs:   Temp:  [97.9 °F (36.6 °C)] 97.9 °F (36.6 °C)  Heart Rate:  [102] 102  BP: (86)/(75) 86/75  ENT Physical Exam  Constitutional  Appearance: patient appears well-developed, well-nourished and well-groomed,  Communication/Voice: communication appropriate for developmental age; vocal quality normal;  Head and Face  Appearance: head appears normal, face appears normal and face appears atraumatic;  Palpation: facial palpation normal;  Salivary: glands normal;  Ear  Auricles: right auricle normal; left auricle normal;  External Mastoids: right external mastoid normal; left external mastoid normal;  Ear Canals: right ear canal normal; left ear canal normal;  Tympanic Membranes: right tympanic membrane normal; left tympanic membrane normal;  Nose  Internal Nose: bilateral intranasal mucosa edematous; nasal septal deviation present; bilateral inferior turbinates with hypertrophy;  Oral Cavity/Oropharynx  Lips: normal;  Teeth: normal;  Gums: gingiva normal;  Tongue: normal;  Oral mucosa: mucous membranes dry;  Hard palate: normal;  Soft palate: normal;  Tonsils: bilateral tonsils absent,  Neck  Neck: neck normal; neck palpation normal;  Thyroid: thyroid normal;  Respiratory  Inspection: breathing unlabored;  Cardiovascular  Inspection: extremities are warm and well perfused;         Result Review    RESULTS REVIEW    I have reviewed the patients old records in the chart.     Assessment/Plan    Diagnoses and all orders for this visit:    1. Throat dryness (Primary)    2. Oral dryness    Other orders  -     guaiFENesin (MUCINEX) 600 MG 12 hr tablet; Take 1 tablet by mouth Every 12 (Twelve) Hours.  Dispense: 60 tablet; Refill: 3            Increase water/ non  caffeinated drink intake to at least 6-8 glasses a day. Decrease caffeine intake. Plain Mucinex over the counter as needed to thin out secretions.  Sleep with the head of bed on an incline. No large meals 1-2 hrs prior to sleep. Avoid fatty meals and caffine or alcohol prior to sleep.       Return in about 3 months (around 6/7/2022) for Follow up with TIAN Crawford.      TIAN Kraus  03/07/22  09:13 CST

## 2022-03-18 ENCOUNTER — OFFICE VISIT (OUTPATIENT)
Dept: CARDIOLOGY | Facility: CLINIC | Age: 87
End: 2022-03-18

## 2022-03-18 VITALS
BODY MASS INDEX: 22.5 KG/M2 | HEIGHT: 66 IN | DIASTOLIC BLOOD PRESSURE: 70 MMHG | SYSTOLIC BLOOD PRESSURE: 140 MMHG | WEIGHT: 140 LBS | HEART RATE: 60 BPM

## 2022-03-18 DIAGNOSIS — Z79.01 LONG TERM (CURRENT) USE OF ANTICOAGULANTS: ICD-10-CM

## 2022-03-18 DIAGNOSIS — I36.1 NONRHEUMATIC TRICUSPID VALVE REGURGITATION: ICD-10-CM

## 2022-03-18 DIAGNOSIS — L03.313 CELLULITIS OF CHEST WALL: ICD-10-CM

## 2022-03-18 DIAGNOSIS — I10 PRIMARY HYPERTENSION: ICD-10-CM

## 2022-03-18 DIAGNOSIS — E78.2 MIXED HYPERLIPIDEMIA: ICD-10-CM

## 2022-03-18 DIAGNOSIS — I27.20 PULMONARY HYPERTENSION: ICD-10-CM

## 2022-03-18 DIAGNOSIS — Z95.0 PRESENCE OF CARDIAC PACEMAKER: Primary | ICD-10-CM

## 2022-03-18 DIAGNOSIS — I48.21 PERMANENT ATRIAL FIBRILLATION: ICD-10-CM

## 2022-03-18 PROCEDURE — 99214 OFFICE O/P EST MOD 30 MIN: CPT | Performed by: INTERNAL MEDICINE

## 2022-03-18 PROCEDURE — 93000 ELECTROCARDIOGRAM COMPLETE: CPT | Performed by: INTERNAL MEDICINE

## 2022-03-18 NOTE — PROGRESS NOTES
Yudi Westbrook  [id number]  2/25/1932  89 y.o.  female    Referring Provider: Elia Preston MD    Reason for  Visit: short term office follow up after recent encounter   sick sinus syndrome s/p pacemaker   Had a small nodule 0.25 mm subcutaneous in the incision with no sign of infection prior visit   Had got red and possibly had some drainage now after starting antibiotics almost resolved and now with a small scab   Now healed completely     Subjective   No new events or complaints since last visit    Not checking blood pressures regularly at home     Mild chronic exertional shortness of breath on exertion relieved with rest  No significant cough or wheezing    No palpitations  No associated chest pain  No significant pedal edema    No fever or chills  No significant expectoration    No hemoptysis  No presyncope or syncope    Tolerating current medications well with no untoward side effects   Compliant with prescribed medication regimen. Tries to adhere to cardiac diet.     Constant post nasal discharge and cannot sleep   Now better     On Warfarin, well tolerated with no bleeding or clotting issues with therapeutic INRs     History of present illness:  Yudi Westbrook is a 89 y.o. yo female with sick sinus syndrome s/p pacemaker  who presents today for   Chief Complaint   Patient presents with   • Wound Check   .    History  Past Medical History:   Diagnosis Date   • Atrial fibrillation (HCC)    • Bradycardia    • Cancer (HCC) 2002    Breast   • Hyperlipidemia    • Hypertension    • Osteoporosis    ,   Past Surgical History:   Procedure Laterality Date   • APPENDECTOMY     • CARDIAC ELECTROPHYSIOLOGY PROCEDURE N/A 11/10/2021    Procedure: Device Implant single chamber mri;  Surgeon: Charles Nunez MD;  Location: [facility] INVASIVE LOCATION;  Service: Cardiology;  Laterality: N/A;   • ENDOSCOPY N/A 11/9/2021    Esophageal ring, medium size HH, Chronic active gastritis   • HYSTERECTOMY     • INSERT /  REPLACE / REMOVE PACEMAKER     • MASTECTOMY Bilateral    ,   Family History   Problem Relation Age of Onset   • Hypertension Father    • Heart attack Brother    • Cancer Brother    • Colon cancer Neg Hx    • Colon polyps Neg Hx    ,   Social History     Tobacco Use   • Smoking status: Never Smoker   • Smokeless tobacco: Never Used   Vaping Use   • Vaping Use: Never used   Substance Use Topics   • Alcohol use: No   • Drug use: No   ,     Medications  Current Outpatient Medications   Medication Sig Dispense Refill   • alendronate (FOSAMAX) 70 MG tablet Take 70 mg by mouth Every 7 (Seven) Days.     • Ascorbic Acid (Vitamin C) 500 MG/5ML liquid Daily.     • BIOTIN 5000 PO Take 1 tablet by mouth 1 (One) Time Per Week.     • Calcium Carb-Cholecalciferol 600-200 MG-UNIT tablet Take 1 tablet by mouth Daily.     • clindamycin (Cleocin) 300 MG capsule Take 1 capsule by mouth 3 (Three) Times a Day. 42 capsule 1   • famotidine (Pepcid) 20 MG tablet Take 1 tablet by mouth 2 (Two) Times a Day. 180 tablet 0   • fluticasone (FLONASE) 50 MCG/ACT nasal spray 2 sprays into the nostril(s) as directed by provider Daily As Needed for Allergies. prn     • guaiFENesin (MUCINEX) 600 MG 12 hr tablet Take 1 tablet by mouth Every 12 (Twelve) Hours. 60 tablet 3   • lisinopril 10 MG tablet 10 mg, hydroCHLOROthiazide 12.5 MG 12.5 mg Daily.     • Magnesium 250 MG tablet Take 1 tablet by mouth Daily.     • Potassium 99 MG tablet Take 1 tablet by mouth Daily.     • Simvastatin 20 MG/5ML suspension Daily.     • vitamin B-12 (CYANOCOBALAMIN) 1000 MCG tablet Take 1 tablet by mouth Daily.     • Vitamin E 100 units tablet Take 1 tablet by mouth Daily.     • warfarin (COUMADIN) 2.5 MG tablet TAKE 1 TABLET BY MOUTH ONCE DAILY EXCEPT SATURDAY       No current facility-administered medications for this visit.       Allergies:  Morphine and Penicillins    Review of Systems  Review of Systems   Constitutional: Negative.   HENT: Negative.    Eyes: Negative.   "  Cardiovascular: Negative for chest pain, claudication, cyanosis, dyspnea on exertion, irregular heartbeat, leg swelling, near-syncope, orthopnea, palpitations, paroxysmal nocturnal dyspnea and syncope.   Respiratory: Negative.    Endocrine: Negative.    Hematologic/Lymphatic: Negative.    Skin: Negative.    Gastrointestinal: Negative for anorexia.   Genitourinary: Negative.    Neurological: Negative.    Psychiatric/Behavioral: Negative.        Objective     Physical Exam:  /70   Pulse 60   Ht 167.6 cm (66\")   Wt 63.5 kg (140 lb)   BMI 22.60 kg/m²     Physical Exam  Constitutional:       Appearance: Normal appearance. She is well-developed.   HENT:      Head: Normocephalic.   Eyes:      General: Lids are normal.   Neck:      Vascular: Normal carotid pulses. No carotid bruit or JVD.      Trachea: No tracheal tenderness or tracheal deviation.   Cardiovascular:      Rate and Rhythm: Regular rhythm.      Pulses: Normal pulses.      Heart sounds: S1 normal and S2 normal. Murmur heard.    Systolic murmur is present with a grade of 2/6.  Pulmonary:      Effort: Pulmonary effort is normal.      Breath sounds: No stridor.   Abdominal:      General: There is no distension.      Palpations: Abdomen is soft.      Tenderness: There is no abdominal tenderness.   Musculoskeletal:      Cervical back: No edema.   Skin:     General: Skin is warm.   Neurological:      Mental Status: She is alert.      Cranial Nerves: No cranial nerve deficit.      Sensory: No sensory deficit.   Psychiatric:         Speech: Speech normal.         Behavior: Behavior normal.         Thought Content: Thought content normal.         Surgical wound healed very well. No evidence of excessive bruising, pain, redness, swelling, discharge or foul odor noted  Patient declined any recent history of fever, chills, pain or warmth locally      Results Review:    Results for orders placed during the hospital encounter of 11/07/21    Adult Transthoracic " Echo Complete W/ Cont if Necessary Per Protocol    Interpretation Summary  · Left ventricular ejection fraction appears to be 56 - 60%. Left ventricular systolic function is normal.  · Left atrial volume is severely increased.  · Moderate tricuspid valve regurgitation is present.  · The right atrial cavity is moderately dilated.  · Moderate pulmonary hypertension is present.        ____________________________________________________________________________________________________________________________________________  Health maintenance and recommendations    Low salt/ HTN/ Heart healthy carbohydrate restricted cardiac diet   The patient is advised to reduce or avoid caffeine or other cardiac stimulants.   Minimize or avoid  NSAID-type medications      Monitor for any signs of bleeding including red or dark stools. Fall precautions.   Advised staying uptodate with immunizations per established standard guidelines.    Offered to give patient  a copy of my notes     Questions were encouraged, asked and answered to the patient's  understanding and satisfaction. Questions if any regarding current medications and side effects, need for refills and importance of compliance to medications stressed.    Reviewed available prior notes, consults, prior visits, laboratory findings, radiology and cardiology relevant reports. Updated chart as applicable. I have reviewed the patient's medical history in detail and updated the computerized patient record as relevant.      Updated patient regarding any new or relevant abnormalities on review of records or any new findings on physical exam. Mentioned to patient about purpose of visit and desirable health short and long term goals and objectives.    Primary to monitor CBC CMP Lipid panel and TSH as applicable    ___________________________________________________________________________________________________________________________________________     ECG 12 Lead    Date/Time:  3/18/2022 10:20 AM  Performed by: Charles Nunez MD  Authorized by: Charles Nunez MD   Comparison: compared with previous ECG from 11/8/2021  Comparison to previous ECG: Ventricular rate changed from  45   to  60 beats per minute    Rhythm: atrial fibrillation and paced  Rate: normal    Clinical impression: abnormal EKG            Assessment/Plan   Diagnoses and all orders for this visit:    1. Presence of cardiac pacemaker (Primary)    2. Pulmonary hypertension (HCC)    3. Primary hypertension    4. Mixed hyperlipidemia    5. Cellulitis of chest wall    6. Permanent atrial fibrillation (HCC)    7. Nonrheumatic tricuspid valve regurgitation moderate 11/2021    8. Long term (current) use of anticoagulants    Other orders  -     ECG 12 Lead          Plan    Pacer site now healed   Overall doing well no new cardiovascular symptoms and therefore no additional cardiac testing is required   If any interim issues arise will call me for further evaluation.     Monitor cardiac rhythm device function regularly per established schedule or PRN    Patient expressed understanding  Encouraged and answered all questions   Discussed with the patient and all questioned fully answered. She will call me if any problems arise.   Discussed results of prior testing with patient : echo   as well electrocardiogram from today       Monitor for any signs of bleeding including red or dark stools as well as easy bruisabilty. Fall precautions.     Monitor cardiac rhythm device function regularly per established schedule or PRN      On Warfarin, well tolerated with no bleeding or clotting issues with therapeutic INRs     Follow up with Alivia OVERTON , August OVERTON  or myself          Return in about 6 months (around 9/18/2022).

## 2022-04-04 ENCOUNTER — TELEPHONE (OUTPATIENT)
Dept: NEUROSURGERY | Facility: CLINIC | Age: 87
End: 2022-04-04

## 2022-04-04 NOTE — TELEPHONE ENCOUNTER
Caller: Saray Westbrookvanessa ORONA    Relationship: Self  Phone number: [phone]  Reason for Call: PATIENT HURT BACK ABOUT A WEEK AGO BY BENDING OVER WHEN PUTTING SOMETHING IN THE CABINET UNDER HER TV.   When was the patient last seen:   When did it start: ABOUT A WEEK AGO.   Where is it located:   Characteristics of symptom/severity: LEFT  HIP AND BACK, RUNS TOWARDS TAILBONE.   Timing- Is it constant or intermittent: PAIN IS CONSTANT 6-7/10, WHEN SHE TURNS THE WRONG IT IS WORSE.  What makes it worse: TURNING THE WRONG WAY, BENDING OVER.   What makes it better:   What therapies/medications have you tried: TYLENOL HELPS A LITTLE BIT BUT IT DOES NOT COMPLETELY GO AWAY       I SCHEDULED PATIENT FOR 4/07 AND ADDED TO THE WAITLIST. SHE WOULD LIKE TO KNOW IF ANY EARLIER APPOINTMENTS. PATIENT WAS PREVIOUSLY SEEN IN OUR OFFICE 09/23/2020 FOR FOLLOW UP REGARDING COMPRESSION FRACTURE.

## 2022-04-05 NOTE — TELEPHONE ENCOUNTER
Caller: Yudi Westbrook  Relationship: Self  Best call back number: [phone]    What was the call regarding: PATIENT CALLED BACK. INFORMED THAT THIS WAS THE EARLIEST APPOINTMENT AT THIS TIME. SHE WAS  UNDERSTANDING.

## 2022-04-07 ENCOUNTER — HOSPITAL ENCOUNTER (OUTPATIENT)
Dept: GENERAL RADIOLOGY | Facility: HOSPITAL | Age: 87
Discharge: HOME | End: 2022-04-07
Admitting: NURSE PRACTITIONER

## 2022-04-07 ENCOUNTER — OFFICE VISIT (OUTPATIENT)
Dept: NEUROSURGERY | Facility: CLINIC | Age: 87
End: 2022-04-07

## 2022-04-07 VITALS — HEIGHT: 66 IN | BODY MASS INDEX: 22.5 KG/M2 | WEIGHT: 140 LBS

## 2022-04-07 DIAGNOSIS — Z91.89 AT HIGH RISK FOR FRACTURE: ICD-10-CM

## 2022-04-07 DIAGNOSIS — M81.0 AGE RELATED OSTEOPOROSIS, UNSPECIFIED PATHOLOGICAL FRACTURE PRESENCE: ICD-10-CM

## 2022-04-07 DIAGNOSIS — M25.552 HIP PAIN, LEFT: ICD-10-CM

## 2022-04-07 DIAGNOSIS — M54.50 LUMBAR BACK PAIN: Primary | ICD-10-CM

## 2022-04-07 PROCEDURE — 99214 OFFICE O/P EST MOD 30 MIN: CPT | Performed by: NURSE PRACTITIONER

## 2022-04-07 PROCEDURE — 73502 X-RAY EXAM HIP UNI 2-3 VIEWS: CPT

## 2022-04-07 PROCEDURE — 72100 X-RAY EXAM L-S SPINE 2/3 VWS: CPT

## 2022-04-07 NOTE — PROGRESS NOTES
Chief complaint:   Chief Complaint   Patient presents with   • Back Pain     Pt is here with complaints of new onset of back pain, and leg pain.        Subjective     HPI:   Last encounter: 9/23/2020    Interval History: Yudi Westbrook is a 89 y.o.  female who presents today with her son with a complaint of a new onset of left sided lumbar back/left posterior hip pain.  Previously evaluated and followed for a L1 compression fracture which she sustained post mechanical fall from standing height on 7/24/2020.      Sudden onset of her symptoms approximately 1 week ago.  Ms. Westbrook states she was bent over and twisted, placing a book on a shelf, and as she began to straighten to an upright position she experienced a sudden onset of left sided lumbar back/left posterior hip pain.  Her discomfort intermittently radiates to the left groin and lateral left hip.  She denies significant lower extremity radicular pain, weakness, numbness, or tingling.  Her discomfort worsens with changing positions and with physical activity that requires lifting and/or twisting.  Minimal alleviation of her discomfort with avoidance and use of Tylenol.  She denies fevers, chills, night sweats, unexplained weight loss, saddle anesthesia, bowel bladder dysfunction.  She currently rates the severity of her symptoms 6/10. No additional concerns at this time.    Oswestry Disability Index = 32%   Score   Pain Intensity Fairly severe pain-3   Personal Care I can look after myself but it is slow and painful-2   Lifting I  can lift heavy weights-0   Walking Pain prevents > 1/4 mile-2   Sitting Pain prevents sitting > 1 hr-2   Standing Pain limits standing to < 1hr-2   Sleeping Can only sleep < 4 hrs-3   Sex Life (if applicable) Not applicable-0   Social Life Social life normal, but increases pain-1   Traveling Travel gives me extra pain-1   (Delmont et al, 1980)    SCORE INTERPRETATION OF THE OSWESTRY LBP DISABILITY QUESTIONNAIRE      20-40% Moderate disability.  This group experiences more pain and problems with sitting, lifting, and standing.  Travel and social life are more difficult and they may well be off work. Personal care, sexual activity, and sleeping are not grossly affected, and the back condition can usually be managed by conservative means.      ROS  Review of Systems   Constitutional: Negative.    HENT: Negative.    Eyes: Negative.    Respiratory: Negative.    Cardiovascular: Negative.    Gastrointestinal: Negative.    Endocrine: Negative.    Genitourinary: Negative.    Musculoskeletal: Positive for back pain.   Skin: Negative.    Allergic/Immunologic: Negative.    Neurological: Negative.    Hematological: Negative.    Psychiatric/Behavioral: Negative.    All other systems reviewed and are negative.    PFSH:  Past Medical History:   Diagnosis Date   • Atrial fibrillation (HCC)    • Bradycardia    • Cancer (HCC) 2002    Breast   • Hyperlipidemia    • Hypertension    • Osteoporosis      Past Surgical History:   Procedure Laterality Date   • APPENDECTOMY     • CARDIAC ELECTROPHYSIOLOGY PROCEDURE N/A 11/10/2021    Procedure: Device Implant single chamber mri;  Surgeon: Charles Nunez MD;  Location: [facility] CATH INVASIVE LOCATION;  Service: Cardiology;  Laterality: N/A;   • ENDOSCOPY N/A 11/9/2021    Esophageal ring, medium size HH, Chronic active gastritis   • HYSTERECTOMY     • INSERT / REPLACE / REMOVE PACEMAKER     • MASTECTOMY Bilateral      Objective      Current Outpatient Medications   Medication Sig Dispense Refill   • Ascorbic Acid (Vitamin C) 500 MG/5ML liquid Daily.     • BIOTIN 5000 PO Take 1 tablet by mouth 1 (One) Time Per Week.     • Calcium Carb-Cholecalciferol 600-200 MG-UNIT tablet Take 1 tablet by mouth Daily.     • fluticasone (FLONASE) 50 MCG/ACT nasal spray 2 sprays into the nostril(s) as directed by provider Daily As Needed for Allergies. prn     • lisinopril 10 MG tablet 10 mg, hydroCHLOROthiazide 12.5 MG 12.5  "mg Daily.     • Magnesium 250 MG tablet Take 1 tablet by mouth Daily.     • Potassium 99 MG tablet Take 1 tablet by mouth Daily.     • Simvastatin 20 MG/5ML suspension Daily.     • vitamin B-12 (CYANOCOBALAMIN) 1000 MCG tablet Take 1 tablet by mouth Daily.     • Vitamin E 100 units tablet Take 1 tablet by mouth Daily.     • warfarin (COUMADIN) 2.5 MG tablet TAKE 1 TABLET BY MOUTH ONCE DAILY EXCEPT SATURDAY       No current facility-administered medications for this visit.     Vital Signs  Ht 167.6 cm (66\")   Wt 63.5 kg (140 lb)   Breastfeeding No   BMI 22.60 kg/m²   Physical Exam  Vitals and nursing note reviewed.   Constitutional:       General: She is not in acute distress.     Appearance: Normal appearance. She is well-developed, well-groomed and normal weight. She is not ill-appearing, toxic-appearing or diaphoretic.   HENT:      Head: Normocephalic and atraumatic.      Right Ear: Hearing normal.      Left Ear: Hearing normal.   Eyes:      Extraocular Movements: EOM normal.      Conjunctiva/sclera: Conjunctivae normal.      Pupils: Pupils are equal, round, and reactive to light.   Neck:      Trachea: Trachea normal.   Cardiovascular:      Rate and Rhythm: Normal rate and regular rhythm.   Pulmonary:      Effort: Pulmonary effort is normal. No tachypnea, bradypnea, accessory muscle usage or respiratory distress.   Abdominal:      Palpations: Abdomen is soft.   Musculoskeletal:      Cervical back: Full passive range of motion without pain and neck supple.      Comments:   Increased pain with both internal and external rotation of the left hip   Skin:     General: Skin is warm and dry.   Neurological:      Mental Status: She is alert and oriented to person, place, and time.      GCS: GCS eye subscore is 4. GCS verbal subscore is 5. GCS motor subscore is 6.      Gait: Gait is intact.      Deep Tendon Reflexes:      Reflex Scores:       Tricep reflexes are 1+ on the right side and 1+ on the left side.       Bicep " reflexes are 1+ on the right side and 1+ on the left side.       Brachioradialis reflexes are 1+ on the right side and 1+ on the left side.       Patellar reflexes are 1+ on the right side and 1+ on the left side.       Achilles reflexes are 1+ on the right side and 1+ on the left side.  Psychiatric:         Speech: Speech normal.         Behavior: Behavior normal. Behavior is cooperative.       Neurologic Exam     Mental Status   Oriented to person, place, and time.   Attention: normal. Concentration: normal.   Speech: speech is normal   Level of consciousness: alert    Cranial Nerves     CN II   Visual fields full to confrontation.     CN III, IV, VI   Pupils are equal, round, and reactive to light.  Extraocular motions are normal.     CN V   Facial sensation intact.     CN VII   Facial expression full, symmetric.     CN VIII   CN VIII normal.     CN IX, X   CN IX normal.     CN XI   CN XI normal.     Motor Exam   Right arm tone: normal  Left arm tone: normal  Right leg tone: normal  Left leg tone: normal    Strength   Right deltoid: 5/5  Left deltoid: 5/5  Right biceps: 5/5  Left biceps: 5/5  Right triceps: 5/5  Left triceps: 5/5  Right wrist extension: 5/5  Left wrist extension: 5/5  Right iliopsoas: 5/5  Left iliopsoas: 5/5  Right quadriceps: 5/5  Left quadriceps: 5/5  Right anterior tibial: 5/5  Left anterior tibial: 5/5  Right gastroc: 5/5  Left gastroc: 5/5  Right EHL 5/5  Left EHL 5/5       Sensory Exam   Right arm light touch: normal  Left arm light touch: normal  Right leg light touch: normal  Left leg light touch: normal    Gait, Coordination, and Reflexes     Gait  Gait: normal    Tremor   Resting tremor: absent  Intention tremor: absent  Action tremor: absent    Reflexes   Right brachioradialis: 1+  Left brachioradialis: 1+  Right biceps: 1+  Left biceps: 1+  Right triceps: 1+  Left triceps: 1+  Right patellar: 1+  Left patellar: 1+  Right achilles: 1+  Left achilles: 1+  Right plantar: normal  Left  plantar: normal  Right Hanson: absent  Left Hanson: absent  Right ankle clonus: absent  Left ankle clonus: absent  Right pendular knee jerk: absent  Left pendular knee jerk: absent  (12 bullet pts)    Results Review:   No radiology results for the last 30 days.    Assessment/Plan: Yudi Westbrook is a 89 y.o. female with a significant medical history of A-fib, chronic anticoagulation on Coumadin, HTN, and hyperlipidemia, and an L1 compression fracture which she obtained as a result of mechanical fall from standing height on 7/24/2020.  She presents today with new onset of left sided lumbar back/left posterior hip pain.  BIRGIT: 32.  Physical exam findings of global hyporeflexia, otherwise neurologically intact.  No radiology results for the last 30 days.    Recommendations:  Lumbar back and left hip pain  For further evaluation we will send Ms. Westbrook for x-rays of the lumbar spine, as well as x-rays of the left hip and pelvis.  We will notify her of any abnormal findings and proceed accordingly.  In the interim, recommended Tylenol and/or ibuprofen as needed per package instructions for pain.  Tentatively, we will have her return for reassessment in 1 month.  I highly advised she take strict precautions while ambulating to avoid falling, as caution is her greatest means of avoiding serious injury.  Ms. Westbrook knows to call to return for new or worsening complaints of weakness, paresthesias, gait disturbances, or any additional concerns.  Treatment options discussed in detail with Yudi and she voiced understanding.  Ms. Westbrook agrees with this plan of care.     Osteoporosis  At high risk for fracture   FRAX Fracture Risk Assessment Tool (https://www.walker.ac.uk/FRAX/tool.aspx?country=9)  Based on the FRAX score Yudi has a ten year probability of a major osteoporotic fracture of 55% and a hip fracture of 46%.     Ms. Westbrook has since discontinued use of Fosamax, as she states she was told by an unknown  provider to stop taking said medication.    Diagnoses and all orders for this visit:    1. Lumbar back pain (Primary)  -     XR Spine Lumbar 2 or 3 View    2. Hip pain, left  -     XR hip w or wo pelvis 2-3 view left; Future    3. Age related osteoporosis, unspecified pathological fracture presence    4. At high risk for fracture      Return in about 1 month (around 5/7/2022) for FOLLOWUP WITH MICHAEL IN 1 MONTH..    Level of Risk: Moderate due to: undiagnosed new problem  MDM: Moderate  (Mod = 16089, High = 40813)    Thank you, for allowing me to continue to participate in the care of this patient.    Sincerely,  Michael Luna, APRN

## 2022-04-08 ENCOUNTER — TELEPHONE (OUTPATIENT)
Dept: NEUROSURGERY | Facility: CLINIC | Age: 87
End: 2022-04-08

## 2022-04-08 NOTE — TELEPHONE ENCOUNTER
----- Message from TIAN Brown sent at 4/8/2022 10:34 AM CDT -----  No evidence of new lumbar or left hip fracture per x-rays.  Rest for the next 2 to 3 days.  Avoid painful movement.  Unless contraindicated, OTC Tylenol and/or ibuprofen as needed per package instructions for pain.  Keep scheduled follow-up for reassessment.  Call if symptoms continue or worsen.    Thank you,    TIAN Brown

## 2022-09-23 ENCOUNTER — OFFICE VISIT (OUTPATIENT)
Dept: CARDIOLOGY | Facility: CLINIC | Age: 87
End: 2022-09-23

## 2022-09-23 VITALS
SYSTOLIC BLOOD PRESSURE: 167 MMHG | OXYGEN SATURATION: 98 % | BODY MASS INDEX: 22.02 KG/M2 | WEIGHT: 137 LBS | HEIGHT: 66 IN | HEART RATE: 79 BPM | DIASTOLIC BLOOD PRESSURE: 77 MMHG

## 2022-09-23 DIAGNOSIS — I10 PRIMARY HYPERTENSION: ICD-10-CM

## 2022-09-23 DIAGNOSIS — I27.20 PULMONARY HYPERTENSION: ICD-10-CM

## 2022-09-23 DIAGNOSIS — E78.2 MIXED HYPERLIPIDEMIA: ICD-10-CM

## 2022-09-23 DIAGNOSIS — Z79.01 LONG TERM (CURRENT) USE OF ANTICOAGULANTS: ICD-10-CM

## 2022-09-23 DIAGNOSIS — Z95.0 PRESENCE OF CARDIAC PACEMAKER: Primary | ICD-10-CM

## 2022-09-23 PROCEDURE — 93000 ELECTROCARDIOGRAM COMPLETE: CPT | Performed by: INTERNAL MEDICINE

## 2022-09-23 PROCEDURE — 99214 OFFICE O/P EST MOD 30 MIN: CPT | Performed by: INTERNAL MEDICINE

## 2022-09-23 RX ORDER — LISINOPRIL 10 MG/1
10 TABLET ORAL DAILY
Qty: 90 TABLET | Refills: 3 | Status: SHIPPED | OUTPATIENT
Start: 2022-09-23 | End: 2022-09-26 | Stop reason: SDUPTHER

## 2022-09-23 RX ORDER — LISINOPRIL 10 MG/1
10 TABLET ORAL DAILY
Qty: 90 TABLET | Refills: 3 | Status: CANCELLED | OUTPATIENT
Start: 2022-09-23

## 2022-09-23 RX ORDER — LISINOPRIL 10 MG/1
10 TABLET ORAL DAILY
Qty: 90 TABLET | Refills: 3 | Status: SHIPPED | OUTPATIENT
Start: 2022-09-23 | End: 2022-09-23 | Stop reason: SDUPTHER

## 2022-09-23 NOTE — PROGRESS NOTES
Yudi Westbrook  [id number]  2/25/1932  89 y.o.  female    Referring Provider: Elia Preston MD    Reason for  Visit:     Here for routine follow up   sick sinus syndrome s/p pacemaker   Had a small nodule 0.25 mm subcutaneous in the incision with no sign of infection prior visit   Had got red and possibly had some drainage now after starting antibiotics almost resolved and now with a small scab   Now healed completely     Subjective     Feels much better   Still mows her yard   On Warfarin, well tolerated with no bleeding or clotting issues with therapeutic INRs   No new events or complaints since last visit  Mild chronic exertional shortness of breath on exertion relieved with rest  No significant cough or wheezing    No palpitations  No associated chest pain  No significant pedal edema    No fever or chills  No significant expectoration    No hemoptysis  No presyncope or syncope    Tolerating current medications well with no untoward side effects   Compliant with prescribed medication regimen. Tries to adhere to cardiac diet.      Device check as below         History of present illness:  Yudi Westbrook is a 89 y.o. yo female with sick sinus syndrome s/p pacemaker  who presents today for   Chief Complaint   Patient presents with   • Atrial Fibrillation     6m fu   .    History  Past Medical History:   Diagnosis Date   • Atrial fibrillation (HCC)    • Bradycardia    • Cancer (HCC) 2002    Breast   • Hyperlipidemia    • Hypertension    • Osteoporosis    ,   Past Surgical History:   Procedure Laterality Date   • APPENDECTOMY     • CARDIAC ELECTROPHYSIOLOGY PROCEDURE N/A 11/10/2021    Procedure: Device Implant single chamber mri;  Surgeon: Charles Nunez MD;  Location: [facility] INVASIVE LOCATION;  Service: Cardiology;  Laterality: N/A;   • ENDOSCOPY N/A 11/9/2021    Esophageal ring, medium size HH, Chronic active gastritis   • HYSTERECTOMY     • INSERT / REPLACE / REMOVE PACEMAKER     • MASTECTOMY  Bilateral    ,   Family History   Problem Relation Age of Onset   • Hypertension Father    • Heart attack Brother    • Cancer Brother    • Colon cancer Neg Hx    • Colon polyps Neg Hx    ,   Social History     Tobacco Use   • Smoking status: Never Smoker   • Smokeless tobacco: Never Used   Vaping Use   • Vaping Use: Never used   Substance Use Topics   • Alcohol use: No   • Drug use: No   ,     Medications  Current Outpatient Medications   Medication Sig Dispense Refill   • Ascorbic Acid (Vitamin C) 500 MG/5ML liquid Daily.     • BIOTIN 5000 PO Take 1 tablet by mouth 1 (One) Time Per Week.     • Calcium Carb-Cholecalciferol 600-200 MG-UNIT tablet Take 1 tablet by mouth Daily.     • fluticasone (FLONASE) 50 MCG/ACT nasal spray 2 sprays into the nostril(s) as directed by provider Daily As Needed for Allergies. prn     • lisinopril 10 MG tablet 10 mg, hydroCHLOROthiazide 12.5 MG 12.5 mg Daily.     • Magnesium 250 MG tablet Take 1 tablet by mouth Daily.     • Potassium 99 MG tablet Take 1 tablet by mouth Daily.     • Simvastatin 20 MG/5ML suspension Daily.     • vitamin B-12 (CYANOCOBALAMIN) 1000 MCG tablet Take 1 tablet by mouth Daily.     • Vitamin E 100 units tablet Take 1 tablet by mouth Daily.     • warfarin (COUMADIN) 2.5 MG tablet TAKE 1 TABLET BY MOUTH ONCE DAILY EXCEPT SATURDAY     • lisinopril (PRINIVIL,ZESTRIL) 10 MG tablet Take 1 tablet by mouth Daily. 90 tablet 3     No current facility-administered medications for this visit.       Allergies:  Morphine and Penicillins    Review of Systems  Review of Systems   Constitutional: Negative.   HENT: Negative.    Eyes: Negative.    Cardiovascular: Negative for chest pain, claudication, cyanosis, dyspnea on exertion, irregular heartbeat, leg swelling, near-syncope, orthopnea, palpitations, paroxysmal nocturnal dyspnea and syncope.   Respiratory: Negative.    Endocrine: Negative.    Hematologic/Lymphatic: Negative.    Skin: Negative.    Gastrointestinal: Negative for  "anorexia.   Genitourinary: Negative.    Neurological: Negative.    Psychiatric/Behavioral: Negative.        Objective     Physical Exam:  /77 (BP Location: Left arm, Patient Position: Sitting, Cuff Size: Adult)   Pulse 79   Ht 167.6 cm (65.98\")   Wt 62.1 kg (137 lb)   SpO2 98%   BMI 22.12 kg/m²     Physical Exam  Constitutional:       Appearance: Normal appearance. She is well-developed.   HENT:      Head: Normocephalic.   Eyes:      General: Lids are normal.   Neck:      Vascular: Normal carotid pulses. No carotid bruit or JVD.      Trachea: No tracheal tenderness or tracheal deviation.   Cardiovascular:      Rate and Rhythm: Regular rhythm.      Pulses: Normal pulses.      Heart sounds: S1 normal and S2 normal. Murmur heard.    Systolic murmur is present with a grade of 2/6.  Pulmonary:      Effort: Pulmonary effort is normal.      Breath sounds: No stridor.   Abdominal:      General: There is no distension.      Palpations: Abdomen is soft.      Tenderness: There is no abdominal tenderness.   Musculoskeletal:      Cervical back: No edema.   Skin:     General: Skin is warm.   Neurological:      Mental Status: She is alert.      Cranial Nerves: No cranial nerve deficit.      Sensory: No sensory deficit.   Psychiatric:         Speech: Speech normal.         Behavior: Behavior normal.         Thought Content: Thought content normal.       Surgical wound healed very well.        Results Review:    Results for orders placed during the hospital encounter of 11/07/21    Adult Transthoracic Echo Complete W/ Cont if Necessary Per Protocol    Interpretation Summary  · Left ventricular ejection fraction appears to be 56 - 60%. Left ventricular systolic function is normal.  · Left atrial volume is severely increased.  · Moderate tricuspid valve regurgitation is present.  · The right atrial cavity is moderately dilated.  · Moderate pulmonary hypertension is present.      "   ____________________________________________________________________________________________________________________________________________  Health maintenance and recommendations    Low salt/ HTN/ Heart healthy carbohydrate restricted cardiac diet   The patient is advised to reduce or avoid caffeine or other cardiac stimulants.   Minimize or avoid  NSAID-type medications      Monitor for any signs of bleeding including red or dark stools. Fall precautions.   Advised staying uptodate with immunizations per established standard guidelines.    Offered to give patient  a copy of my notes     Questions were encouraged, asked and answered to the patient's  understanding and satisfaction. Questions if any regarding current medications and side effects, need for refills and importance of compliance to medications stressed.    Reviewed available prior notes, consults, prior visits, laboratory findings, radiology and cardiology relevant reports. Updated chart as applicable. I have reviewed the patient's medical history in detail and updated the computerized patient record as relevant.      Updated patient regarding any new or relevant abnormalities on review of records or any new findings on physical exam. Mentioned to patient about purpose of visit and desirable health short and long term goals and objectives.    Primary to monitor CBC CMP Lipid panel and TSH as applicable    ___________________________________________________________________________________________________________________________________________     ECG 12 Lead    Date/Time: 9/23/2022 9:15 AM  Performed by: Charles Nunez MD  Authorized by: Charles Nunez MD   Comparison: compared with previous ECG from 3/18/2022  Comparison to previous ECG: Ventricular rate changed from  60  to  65  beats per minute    Rhythm: atrial fibrillation and paced  Rate: normal    Clinical impression: abnormal EKG            Assessment & Plan   Diagnoses and all orders for this  visit:    1. Presence of cardiac pacemaker (Primary)    2. Pulmonary hypertension (HCC)    3. Mixed hyperlipidemia    4. Primary hypertension    5. Long term (current) use of anticoagulants    Other orders  -     lisinopril (PRINIVIL,ZESTRIL) 10 MG tablet; Take 1 tablet by mouth Daily.  Dispense: 90 tablet; Refill: 3  -     ECG 12 Lead          Plan      Needs better BP control    Requested Prescriptions     Signed Prescriptions Disp Refills   • lisinopril (PRINIVIL,ZESTRIL) 10 MG tablet 90 tablet 3     Sig: Take 1 tablet by mouth Daily.      Check BP and heart rates twice daily initially till blood pressures and heart rates under good control and then at least 3x / week,   If blood pressures continue to be well-controlled then can check week a month  at home and bring a recording for review next visit  If BP >130/85 or < 100/60 persistently over 3 reading 30 mins apart or if heart rates persistently above 100 bpm or less than 55 bpm call sooner for evaluation and advise     Pacer site now healed   Overall doing well no new cardiovascular symptoms and therefore no additional cardiac testing is required   If any interim issues arise will call me for further evaluation.     Monitor cardiac rhythm device function regularly per established schedule or PRN    Patient expressed understanding  Encouraged and answered all questions   Discussed with the patient and all questioned fully answered. She will call me if any problems arise.   Discussed results of prior testing with patient : echo   as well electrocardiogram from today       Monitor for any signs of bleeding including red or dark stools as well as easy bruisabilty. Fall precautions.        Follow up with Alivia OVERTON         Return in about 3 months (around 12/23/2022).

## 2022-09-26 RX ORDER — LISINOPRIL 10 MG/1
10 TABLET ORAL NIGHTLY
Qty: 90 TABLET | Refills: 3 | Status: SHIPPED | OUTPATIENT
Start: 2022-09-26

## 2022-09-26 NOTE — TELEPHONE ENCOUNTER
PATIENT REQUESTS LISINOPRIL BE SENT TO [facility] - SHE WOULD LIKE TO PICK THIS UP ASAP - PLEASE SIGN

## 2022-12-12 PROCEDURE — 93294 REM INTERROG EVL PM/LDLS PM: CPT | Performed by: INTERNAL MEDICINE

## 2022-12-12 PROCEDURE — 93296 REM INTERROG EVL PM/IDS: CPT | Performed by: INTERNAL MEDICINE

## 2023-01-16 ENCOUNTER — TELEPHONE (OUTPATIENT)
Dept: CARDIOLOGY | Facility: CLINIC | Age: 88
End: 2023-01-16
Payer: MEDICARE

## 2023-01-18 ENCOUNTER — OFFICE VISIT (OUTPATIENT)
Dept: CARDIOLOGY | Facility: CLINIC | Age: 88
End: 2023-01-18
Payer: MEDICARE

## 2023-01-18 ENCOUNTER — CLINICAL SUPPORT NO REQUIREMENTS (OUTPATIENT)
Dept: CARDIOLOGY | Facility: CLINIC | Age: 88
End: 2023-01-18
Payer: MEDICARE

## 2023-01-18 VITALS
OXYGEN SATURATION: 93 % | WEIGHT: 137 LBS | SYSTOLIC BLOOD PRESSURE: 168 MMHG | HEART RATE: 68 BPM | BODY MASS INDEX: 22.02 KG/M2 | HEIGHT: 66 IN | DIASTOLIC BLOOD PRESSURE: 69 MMHG

## 2023-01-18 DIAGNOSIS — Z79.01 CHRONIC ANTICOAGULATION: ICD-10-CM

## 2023-01-18 DIAGNOSIS — E78.2 MIXED HYPERLIPIDEMIA: ICD-10-CM

## 2023-01-18 DIAGNOSIS — I49.5 SICK SINUS SYNDROME: Primary | ICD-10-CM

## 2023-01-18 DIAGNOSIS — R00.1 BRADYCARDIA: ICD-10-CM

## 2023-01-18 DIAGNOSIS — Z95.0 PRESENCE OF CARDIAC PACEMAKER: ICD-10-CM

## 2023-01-18 DIAGNOSIS — Z95.0 PRESENCE OF CARDIAC PACEMAKER: Primary | ICD-10-CM

## 2023-01-18 DIAGNOSIS — I48.0 PAROXYSMAL ATRIAL FIBRILLATION: ICD-10-CM

## 2023-01-18 DIAGNOSIS — I10 PRIMARY HYPERTENSION: ICD-10-CM

## 2023-01-18 PROCEDURE — 93000 ELECTROCARDIOGRAM COMPLETE: CPT | Performed by: NURSE PRACTITIONER

## 2023-01-18 PROCEDURE — 99214 OFFICE O/P EST MOD 30 MIN: CPT | Performed by: NURSE PRACTITIONER

## 2023-01-18 NOTE — PROGRESS NOTES
Subjective:     Encounter Date: 01/18/2023      Patient ID: Yudi Westbrook is a 89 y.o. female with sick sinus syndrome s/p pacemaker, paroxysmal atrial fibrillation, chronic anticoagulation, hypertension, hyperlipidemia and pulmonary hypertension.    Chief Complaint: 4 month follow up  Atrial Fibrillation  Presents for follow-up visit. Symptoms are negative for bradycardia, chest pain, dizziness, hemodynamic instability, hypertension, hypotension, palpitations, shortness of breath, syncope, tachycardia and weakness. The symptoms have been stable. Past medical history includes atrial fibrillation. There are no medication compliance problems.   Hypertension  This is a chronic problem. The current episode started more than 1 year ago. Pertinent negatives include no chest pain, malaise/fatigue, orthopnea, palpitations, peripheral edema, PND or shortness of breath. Risk factors for coronary artery disease include dyslipidemia. Current antihypertension treatment includes ACE inhibitors.     Patient presents today for management of atrial fibrillation. Today patient reports that she is doing ok. She reports that she monitors her blood pressure at home at times and it has been running 130-150/70-80's she reports that her heart rate has been 60-70's. She denies any chest pain. She denies any heart racing or palpitations. She denies any leg swelling, orthopnea or PND. She denies any dizziness or near syncope. Patient is on Warfarin and denies any bleeding issues. INR is monitored by our coumadin clinic. Patient follows with Dr Preston as PCP.     The following portions of the patient's history were reviewed and updated as appropriate: allergies, current medications, past family history, past medical history, past social history, past surgical history and problem list.    Allergies   Allergen Reactions   • Morphine Unknown - Low Severity     Has not taken. But family members cannot take.   • Penicillins Rash        Current Outpatient Medications:   •  Ascorbic Acid (Vitamin C) 500 MG/5ML liquid, Daily., Disp: , Rfl:   •  BIOTIN 5000 PO, Take 1 tablet by mouth 1 (One) Time Per Week., Disp: , Rfl:   •  Calcium Carb-Cholecalciferol 600-200 MG-UNIT tablet, Take 1 tablet by mouth Daily., Disp: , Rfl:   •  fluticasone (FLONASE) 50 MCG/ACT nasal spray, 2 sprays into the nostril(s) as directed by provider Daily As Needed for Allergies. prn, Disp: , Rfl:   •  lisinopril (PRINIVIL,ZESTRIL) 10 MG tablet, Take 1 tablet by mouth Every Night., Disp: 90 tablet, Rfl: 3  •  Magnesium 250 MG tablet, Take 1 tablet by mouth Daily., Disp: , Rfl:   •  Potassium 99 MG tablet, Take 1 tablet by mouth Daily., Disp: , Rfl:   •  Simvastatin 20 MG/5ML suspension, Daily., Disp: , Rfl:   •  vitamin B-12 (CYANOCOBALAMIN) 1000 MCG tablet, Take 1 tablet by mouth Daily., Disp: , Rfl:   •  Vitamin E 100 units tablet, Take 1 tablet by mouth Daily., Disp: , Rfl:   •  warfarin (COUMADIN) 2.5 MG tablet, TAKE 1 TABLET BY MOUTH ONCE DAILY EXCEPT SATURDAY, Disp: , Rfl:   Past Medical History:   Diagnosis Date   • Atrial fibrillation (HCC)    • Bradycardia    • Cancer (HCC) 2002    Breast   • Hyperlipidemia    • Hypertension    • Osteoporosis      Social History     Socioeconomic History   • Marital status:    Tobacco Use   • Smoking status: Never   • Smokeless tobacco: Never   Vaping Use   • Vaping Use: Never used   Substance and Sexual Activity   • Alcohol use: No   • Drug use: No   • Sexual activity: Defer       Review of Systems   Constitutional: Negative for malaise/fatigue.   HENT: Negative for nosebleeds.    Cardiovascular: Negative for chest pain, dyspnea on exertion, irregular heartbeat, leg swelling, near-syncope, orthopnea, palpitations, paroxysmal nocturnal dyspnea and syncope.   Respiratory: Negative for shortness of breath.    Hematologic/Lymphatic: Bruises/bleeds easily.   Genitourinary: Negative for hematuria.   Neurological: Negative for  "dizziness and weakness.   All other systems reviewed and are negative.         Objective:     Vitals reviewed.   Constitutional:       General: Not in acute distress.     Appearance: Normal appearance. Well-developed.   Eyes:      Pupils: Pupils are equal, round, and reactive to light.   HENT:      Head: Normocephalic and atraumatic.      Nose: Nose normal.   Neck:      Vascular: No carotid bruit.   Pulmonary:      Effort: Pulmonary effort is normal. No respiratory distress.      Breath sounds: Normal breath sounds. No wheezing. No rales.   Cardiovascular:      Normal rate. Regular rhythm.      Murmurs: There is no murmur.   Edema:     Peripheral edema absent.   Abdominal:      General: There is no distension.      Palpations: Abdomen is soft.   Musculoskeletal: Normal range of motion.      Cervical back: Normal range of motion and neck supple. Skin:     General: Skin is warm.      Findings: No erythema or rash.   Neurological:      General: No focal deficit present.      Mental Status: Alert and oriented to person, place, and time.   Psychiatric:         Attention and Perception: Attention normal.         Mood and Affect: Mood normal.         Speech: Speech normal.         Behavior: Behavior normal.         Thought Content: Thought content normal.         Judgment: Judgment normal.         /69   Pulse 68   Ht 167.6 cm (65.98\")   Wt 62.1 kg (137 lb)   SpO2 93%   BMI 22.12 kg/m²       ECG 12 Lead    Date/Time: 1/18/2023 2:20 PM  Performed by: August Rasmussen APRN  Authorized by: August Rasmussen APRN   Comparison: compared with previous ECG from 9/23/2022  Similar to previous ECG  Rhythm: paced  Rate: normal  BPM: 60              Lab Review:         Results for orders placed during the hospital encounter of 11/07/21    Adult Transthoracic Echo Complete W/ Cont if Necessary Per Protocol    Interpretation Summary  · Left ventricular ejection fraction appears to be 56 - 60%. Left ventricular systolic " function is normal.  · Left atrial volume is severely increased.  · Moderate tricuspid valve regurgitation is present.  · The right atrial cavity is moderately dilated.  · Moderate pulmonary hypertension is present.    No results found for: CHOL, CHLPL, TRIG, HDL, LDL, LDLDIRECT    I have personally reviewed pacemaker interrogation, echo and past office notes prior to patients visit  Assessment:          Diagnosis Plan   1. Sick sinus syndrome (HCC)        2. Presence of cardiac pacemaker        3. Paroxysmal atrial fibrillation (HCC)  ECG 12 Lead      4. Chronic anticoagulation        5. Primary hypertension        6. Mixed hyperlipidemia               Plan:       1. Sick sinus syndrome: s/p pacemaker    2. Pacemaker: interrogation from 12/17/2022 reviewed. Normal device function. Battery 9 yrs. RV pacing at 93%. Interrogation done today in office that showed one episode of NSVT that lasted 3 seconds.     3. Paroxysmal atrial fibrillation: none noted on recent interrogations.     4. Chronic anticoagulation: [id number] score 4. Patient is on Warfarin and denies any bleeding issues. INR is monitored by our coumadin clinic    5. Hypertension: slightly elevated today. patient reports better control at home. Continue current medications    6. Hyperlipidemia: Managed and followed by PCP. Continue simvastatin    Advance Care Planning   ACP discussion was held with the patient during this visit. Patient has an advance directive in EMR which is still valid.      I spent 37 minutes caring for Yudi on this date of service. This time includes time spent by me in the following activities:preparing for the visit, reviewing tests, obtaining and/or reviewing a separately obtained history, performing a medically appropriate examination and/or evaluation , counseling and educating the patient/family/caregiver and documenting information in the medical record     I spent 2 minutes on the separately reported service of EKG. This  time is not included in the time used to support the E/M service also reported today.    Patient is to follow up in 6 months or sooner if needed

## 2023-05-04 PROCEDURE — 93294 REM INTERROG EVL PM/LDLS PM: CPT | Performed by: INTERNAL MEDICINE

## 2023-05-04 PROCEDURE — 93296 REM INTERROG EVL PM/IDS: CPT | Performed by: INTERNAL MEDICINE

## 2023-07-26 NOTE — PROGRESS NOTES
Subjective:     Encounter Date: 07/27/2023      Patient ID: Yudi Westbrook is a 89 y.o. female with sick sinus syndrome s/p pacemaker, paroxysmal atrial fibrillation, chronic anticoagulation, hypertension, hyperlipidemia and pulmonary hypertension.    Chief Complaint: 4 month follow up    Atrial Fibrillation  Presents for follow-up visit. Symptoms are negative for bradycardia, chest pain, dizziness, hemodynamic instability, hypertension, hypotension, palpitations, shortness of breath, syncope, tachycardia and weakness. The symptoms have been stable. Past medical history includes atrial fibrillation. There are no medication compliance problems.   Hypertension  This is a chronic problem. The current episode started more than 1 year ago. Pertinent negatives include no chest pain, malaise/fatigue, orthopnea, palpitations, peripheral edema, PND or shortness of breath. Risk factors for coronary artery disease include dyslipidemia. Current antihypertension treatment includes ACE inhibitors.     Patient presents today for management of atrial fibrillation. Today patient reports that she is doing good. She has had some coughing and congestion for about 2 weeks now. She has been taking zyrtec with little improvement. She denies any chest pain. She denies any heart racing or palpitations. She reports that she doesn't monitor her blood pressure at home and it has been running 130-150/70-80's she reports that her heart rate has been 60-70's. She denies any heart racing or palpitations. She denies any leg swelling, orthopnea or PND. She denies any dizziness or near syncope. She denies any dyspnea on exertion. She reports that she is walking several days a week. Patient is on Warfarin and denies any bleeding issues. INR is monitored by our coumadin clinic. Patient follows with Dr Preston as PCP.     The following portions of the patient's history were reviewed and updated as appropriate: allergies, current medications, past  family history, past medical history, past social history, past surgical history and problem list.    Allergies   Allergen Reactions    Morphine Unknown - Low Severity     Has not taken. But family members cannot take.    Penicillins Rash       Current Outpatient Medications:     Ascorbic Acid (Vitamin C) 500 MG/5ML liquid, Daily., Disp: , Rfl:     BIOTIN 5000 PO, Take 1 tablet by mouth 1 (One) Time Per Week., Disp: , Rfl:     Calcium Carb-Cholecalciferol 600-200 MG-UNIT tablet, Take 1 tablet by mouth Daily., Disp: , Rfl:     fluticasone (FLONASE) 50 MCG/ACT nasal spray, 2 sprays into the nostril(s) as directed by provider Daily As Needed for Allergies. prn, Disp: , Rfl:     lisinopril (PRINIVIL,ZESTRIL) 10 MG tablet, Take 1 tablet by mouth Every Night., Disp: 90 tablet, Rfl: 3    Magnesium 250 MG tablet, Take 1 tablet by mouth Daily., Disp: , Rfl:     Potassium 99 MG tablet, Take 1 tablet by mouth Daily., Disp: , Rfl:     Simvastatin 20 MG/5ML suspension, Daily., Disp: , Rfl:     vitamin B-12 (CYANOCOBALAMIN) 1000 MCG tablet, Take 1 tablet by mouth Daily., Disp: , Rfl:     Vitamin E 100 units tablet, Take 1 tablet by mouth Daily., Disp: , Rfl:     warfarin (COUMADIN) 2.5 MG tablet, TAKE 1 TABLET BY MOUTH ONCE DAILY EXCEPT SATURDAY, Disp: , Rfl:     Past Medical History:   Diagnosis Date    Atrial fibrillation     Bradycardia     Cancer 2002    Breast    Hyperlipidemia     Hypertension     Osteoporosis      Social History     Socioeconomic History    Marital status:    Tobacco Use    Smoking status: Never     Passive exposure: Never    Smokeless tobacco: Never   Vaping Use    Vaping Use: Never used   Substance and Sexual Activity    Alcohol use: No    Drug use: No    Sexual activity: Defer       Review of Systems   Constitutional: Negative for malaise/fatigue.   HENT:  Negative for nosebleeds.    Cardiovascular:  Negative for chest pain, dyspnea on exertion, irregular heartbeat, leg swelling, near-syncope,  "orthopnea, palpitations, paroxysmal nocturnal dyspnea and syncope.   Respiratory:  Negative for shortness of breath.    Hematologic/Lymphatic: Bruises/bleeds easily.   Genitourinary:  Negative for hematuria.   Neurological:  Negative for dizziness and weakness.   All other systems reviewed and are negative.       Objective:     Vitals reviewed.   Constitutional:       General: Not in acute distress.     Appearance: Normal appearance. Well-developed.   Eyes:      Pupils: Pupils are equal, round, and reactive to light.   HENT:      Head: Normocephalic and atraumatic.      Nose: Nose normal.   Neck:      Vascular: No carotid bruit.   Pulmonary:      Effort: Pulmonary effort is normal. No respiratory distress.      Breath sounds: Normal breath sounds. No wheezing. No rales.   Cardiovascular:      Normal rate. Regular rhythm.      Murmurs: There is no murmur.   Edema:     Peripheral edema absent.   Abdominal:      General: There is no distension.      Palpations: Abdomen is soft.   Musculoskeletal: Normal range of motion.      Cervical back: Normal range of motion and neck supple. Skin:     General: Skin is warm.      Findings: No erythema or rash.   Neurological:      General: No focal deficit present.      Mental Status: Alert and oriented to person, place, and time.   Psychiatric:         Attention and Perception: Attention normal.         Mood and Affect: Mood normal.         Speech: Speech normal.         Behavior: Behavior normal.         Thought Content: Thought content normal.         Judgment: Judgment normal.       /76 (BP Location: Left arm, Patient Position: Sitting, Cuff Size: Adult)   Pulse 65   Ht 167.6 cm (65.98\")   Wt 59.4 kg (131 lb)   SpO2 96%   BMI 21.15 kg/m²       ECG 12 Lead    Date/Time: 7/27/2023 8:48 AM  Performed by: August Rasmussen APRN  Authorized by: August Rasmussen APRN   Comparison: compared with previous ECG from 1/18/2023  Similar to previous ECG  Rhythm: sinus rhythm  Rate: " normal  BPM: 65  Conduction: left bundle branch block  QRS axis: right        Lab Review:         Results for orders placed during the hospital encounter of 11/07/21    Adult Transthoracic Echo Complete W/ Cont if Necessary Per Protocol    Interpretation Summary  · Left ventricular ejection fraction appears to be 56 - 60%. Left ventricular systolic function is normal.  · Left atrial volume is severely increased.  · Moderate tricuspid valve regurgitation is present.  · The right atrial cavity is moderately dilated.  · Moderate pulmonary hypertension is present.      I have personally reviewed pacemaker interrogation, echo and past office notes prior to patients visit  Assessment:          Diagnosis Plan   1. Sick sinus syndrome        2. Presence of cardiac pacemaker        3. Paroxysmal atrial fibrillation  ECG 12 Lead      4. Chronic anticoagulation        5. Primary hypertension        6. Mixed hyperlipidemia        7. Pulmonary hypertension               Plan:       1. Sick sinus syndrome: s/p pacemaker    2. Pacemaker: interrogation from 4/19/2023 reviewed. Normal device function. Battery 8 yrs. RV pacing at 94%. Interrogation showed one episode of NSVT that lasted 4 seconds.     3. Paroxysmal atrial fibrillation: none noted on recent interrogations. EKG today showed NSR rate 65.     4. Chronic anticoagulation: [id number] score 4. Patient is on Warfarin and denies any bleeding issues. INR is monitored by our coumadin clinic    5. Hypertension: acceptable in office today. Continue current medications.     6. Hyperlipidemia: Managed and followed by PCP. Continue simvastatin    7. Pulmonary hypertension: moderate on echo 11/2021.     I attest that all portions of this note reviewed and all information has been updated by myself to reflect the patient's current status.      I spent 38 minutes caring for Yudi on this date of service. This time includes time spent by me in the following activities:preparing for  the visit, reviewing tests, obtaining and/or reviewing a separately obtained history, performing a medically appropriate examination and/or evaluation , counseling and educating the patient/family/caregiver, ordering medications, tests, or procedures, and documenting information in the medical record    I spent 2 minutes on the separately reported service of EKG. This time is not included in the time used to support the E/M service also reported today.    Patient is to follow up in 6 months or sooner if needed

## 2023-07-27 ENCOUNTER — OFFICE VISIT (OUTPATIENT)
Dept: CARDIOLOGY | Facility: CLINIC | Age: 88
End: 2023-07-27
Payer: MEDICARE

## 2023-07-27 VITALS
DIASTOLIC BLOOD PRESSURE: 76 MMHG | SYSTOLIC BLOOD PRESSURE: 155 MMHG | HEART RATE: 65 BPM | WEIGHT: 131 LBS | BODY MASS INDEX: 21.05 KG/M2 | HEIGHT: 66 IN | OXYGEN SATURATION: 96 %

## 2023-07-27 DIAGNOSIS — I49.5 SICK SINUS SYNDROME: Primary | ICD-10-CM

## 2023-07-27 DIAGNOSIS — I27.20 PULMONARY HYPERTENSION: ICD-10-CM

## 2023-07-27 DIAGNOSIS — E78.2 MIXED HYPERLIPIDEMIA: ICD-10-CM

## 2023-07-27 DIAGNOSIS — Z95.0 PRESENCE OF CARDIAC PACEMAKER: ICD-10-CM

## 2023-07-27 DIAGNOSIS — I10 PRIMARY HYPERTENSION: ICD-10-CM

## 2023-07-27 DIAGNOSIS — I48.0 PAROXYSMAL ATRIAL FIBRILLATION: ICD-10-CM

## 2023-07-27 DIAGNOSIS — Z79.01 CHRONIC ANTICOAGULATION: ICD-10-CM

## 2023-07-27 RX ORDER — LISINOPRIL 10 MG/1
10 TABLET ORAL NIGHTLY
Qty: 90 TABLET | Refills: 3 | Status: SHIPPED | OUTPATIENT
Start: 2023-07-27

## 2023-07-27 RX ORDER — LISINOPRIL 10 MG/1
10 TABLET ORAL NIGHTLY
Qty: 90 TABLET | Refills: 3 | Status: SHIPPED | OUTPATIENT
Start: 2023-07-27 | End: 2023-07-27 | Stop reason: SDUPTHER

## 2024-01-22 NOTE — PROGRESS NOTES
I have reviewed the notes, assessments, and/or procedures performed by  Malini Chaves RN, I concur with her  documentation  of Yudi Westbrook.

## 2024-01-22 NOTE — PROGRESS NOTES
Single Chamber Ventricular Pacemaker Interrogation Report  IN OFFICE    January 18, 2023    Primary Cardiologist:  Mayra  : [employer] Model: Accolade MRI L310  Implant date: 11.10.2021    Reason for evaluation:  Routine  Indication for pacemaker: Bradycardia    Interrogation performed by:  Malini Chaves RN    Measurements  Ventricular sensing - R wave: 10.1 mV  Ventricular threshold: 0.8 V @ 0.4 ms  Ventricular lead impedance: 688 ohms    Manual sensing and threshold testing performed:  Yes      Diagnostic Data  Paced: 93 %    Episodes/Alerts since 12.12.2022: 1 ventricular high rate episode, duration 4 seconds, rate 182 bpm.    Battery status: Satisfactory  estimated 9 years remaining       Final Parameters  Mode: VVIR  Lower rate: 60 bpm    Ventricular - Amplitude: 1.5 V Pulse width: 0.4 ms Sensitivity: 1.5 mV     Changes made: None.    Conclusions: Normal Pacemaker Function, Stable Pacing and Sensing Thresholds, and Adequate Battery Reserve    Remote Monitor:  Yes, connected.    Follow up: Every 3 months via latitude, annually in office

## 2024-01-23 ENCOUNTER — TELEPHONE (OUTPATIENT)
Dept: CARDIOLOGY | Facility: CLINIC | Age: 89
End: 2024-01-23
Payer: MEDICARE

## 2024-01-23 NOTE — TELEPHONE ENCOUNTER
Patient missed her yearly device check on 1.23.24.  RN left a message for her son to return call.  Device check has been moved to 2.1.24, 30 minutes before scheduled appointment with TIAN Garcia.  Reminders mailed to patient.

## 2024-02-01 ENCOUNTER — OFFICE VISIT (OUTPATIENT)
Dept: CARDIOLOGY | Facility: CLINIC | Age: 89
End: 2024-02-01
Payer: MEDICARE

## 2024-02-01 VITALS
OXYGEN SATURATION: 96 % | SYSTOLIC BLOOD PRESSURE: 122 MMHG | BODY MASS INDEX: 21.53 KG/M2 | WEIGHT: 134 LBS | HEIGHT: 66 IN | DIASTOLIC BLOOD PRESSURE: 72 MMHG | HEART RATE: 61 BPM

## 2024-02-01 DIAGNOSIS — Z79.01 CHRONIC ANTICOAGULATION: ICD-10-CM

## 2024-02-01 DIAGNOSIS — E78.2 MIXED HYPERLIPIDEMIA: ICD-10-CM

## 2024-02-01 DIAGNOSIS — I27.20 PULMONARY HYPERTENSION: ICD-10-CM

## 2024-02-01 DIAGNOSIS — Z95.0 PRESENCE OF CARDIAC PACEMAKER: ICD-10-CM

## 2024-02-01 DIAGNOSIS — I10 PRIMARY HYPERTENSION: ICD-10-CM

## 2024-02-01 DIAGNOSIS — I48.0 PAROXYSMAL ATRIAL FIBRILLATION: ICD-10-CM

## 2024-02-01 DIAGNOSIS — I49.5 SICK SINUS SYNDROME: Primary | ICD-10-CM

## 2024-02-01 RX ORDER — SIMVASTATIN 20 MG
20 TABLET ORAL NIGHTLY
COMMUNITY

## 2024-02-01 RX ORDER — LISINOPRIL AND HYDROCHLOROTHIAZIDE 12.5; 1 MG/1; MG/1
1 TABLET ORAL EVERY MORNING
COMMUNITY

## 2024-02-01 RX ORDER — LISINOPRIL 10 MG/1
10 TABLET ORAL NIGHTLY
Qty: 90 TABLET | Refills: 3 | Status: SHIPPED | OUTPATIENT
Start: 2024-02-01

## 2024-02-01 NOTE — PROGRESS NOTES
Subjective:     Encounter Date: 02/01/2024      Patient ID: Yudi Westbrook is a 89 y.o. female with sick sinus syndrome s/p pacemaker, paroxysmal atrial fibrillation, chronic anticoagulation, hypertension, hyperlipidemia and pulmonary hypertension.    Chief Complaint: routine follow up    Atrial Fibrillation  Presents for follow-up visit. Symptoms are negative for bradycardia, chest pain, dizziness, hemodynamic instability, hypertension, hypotension, palpitations, shortness of breath, syncope, tachycardia and weakness. The symptoms have been stable. Past medical history includes atrial fibrillation. There are no medication compliance problems.   Hypertension  This is a chronic problem. The current episode started more than 1 year ago. Pertinent negatives include no chest pain, malaise/fatigue, orthopnea, palpitations, peripheral edema, PND or shortness of breath. Risk factors for coronary artery disease include dyslipidemia. Current antihypertension treatment includes ACE inhibitors.     Patient presents today for management of atrial fibrillation. Today patient reports that she is doing pretty good. She states that she has been feeling more fatigued and tired feeling. She reports that she likes to work and states that once she gets her house clean then she doesn't have anything else to do. She denies any chest pain. She denies any heart racing or palpitations. She reports that she tries to walk regularly when she can. Her BP has been running 130-150/70-80's she reports that her heart rate has been 60-70's. She denies any leg swelling, orthopnea or PND. She denies any dizziness or near syncope. She denies any dyspnea on exertion. She reports that she is walking several days a week. Patient is on Warfarin and denies any bleeding issues. INR is monitored by our coumadin clinic. Patient follows with Dr Preston as PCP.     The following portions of the patient's history were reviewed and updated as appropriate:  allergies, current medications, past family history, past medical history, past social history, past surgical history and problem list.    Allergies   Allergen Reactions    Morphine Unknown - Low Severity     Has not taken. But family members cannot take.    Penicillins Rash       Current Outpatient Medications:     BIOTIN 5000 PO, Take 1 tablet by mouth 1 (One) Time Per Week., Disp: , Rfl:     Calcium Carb-Cholecalciferol 600-200 MG-UNIT tablet, Take 1 tablet by mouth Daily., Disp: , Rfl:     fluticasone (FLONASE) 50 MCG/ACT nasal spray, 2 sprays into the nostril(s) as directed by provider Daily As Needed for Allergies. prn, Disp: , Rfl:     lisinopril (PRINIVIL,ZESTRIL) 10 MG tablet, Take 1 tablet by mouth Every Night., Disp: 90 tablet, Rfl: 3    lisinopril-hydrochlorothiazide (PRINZIDE,ZESTORETIC) 10-12.5 MG per tablet, Take 1 tablet by mouth Every Morning., Disp: , Rfl:     Potassium 99 MG tablet, Take 1 tablet by mouth Daily., Disp: , Rfl:     simvastatin (ZOCOR) 20 MG tablet, Take 1 tablet by mouth Every Night., Disp: , Rfl:     vitamin B-12 (CYANOCOBALAMIN) 1000 MCG tablet, Take 1 tablet by mouth Daily., Disp: , Rfl:     warfarin (COUMADIN) 2.5 MG tablet, TAKE 1 TABLET BY MOUTH ONCE DAILY EXCEPT SATURDAY, Disp: , Rfl:     Vitamin E 100 units tablet, Take 1 tablet by mouth Daily., Disp: , Rfl:     Past Medical History:   Diagnosis Date    Atrial fibrillation     Bradycardia     Cancer 2002    Breast    Hyperlipidemia     Hypertension     Osteoporosis      Social History     Socioeconomic History    Marital status:    Tobacco Use    Smoking status: Never     Passive exposure: Never    Smokeless tobacco: Never   Vaping Use    Vaping Use: Never used   Substance and Sexual Activity    Alcohol use: No    Drug use: No    Sexual activity: Defer       Review of Systems   Constitutional: Negative for malaise/fatigue.   HENT:  Negative for nosebleeds.    Cardiovascular:  Negative for chest pain, dyspnea on  "exertion, irregular heartbeat, leg swelling, near-syncope, orthopnea, palpitations, paroxysmal nocturnal dyspnea and syncope.   Respiratory:  Negative for shortness of breath.    Hematologic/Lymphatic: Bruises/bleeds easily.   Genitourinary:  Negative for hematuria.   Neurological:  Negative for dizziness and weakness.   All other systems reviewed and are negative.         Objective:     Vitals reviewed.   Constitutional:       General: Not in acute distress.     Appearance: Normal appearance. Well-developed.   Eyes:      Pupils: Pupils are equal, round, and reactive to light.   HENT:      Head: Normocephalic and atraumatic.      Nose: Nose normal.   Neck:      Vascular: No carotid bruit.   Pulmonary:      Effort: Pulmonary effort is normal. No respiratory distress.      Breath sounds: Normal breath sounds. No wheezing. No rales.   Cardiovascular:      Normal rate. Regular rhythm.      Murmurs: There is no murmur.   Edema:     Peripheral edema absent.   Abdominal:      General: There is no distension.      Palpations: Abdomen is soft.   Musculoskeletal: Normal range of motion.      Cervical back: Normal range of motion and neck supple. Skin:     General: Skin is warm.      Findings: No erythema or rash.   Neurological:      General: No focal deficit present.      Mental Status: Alert and oriented to person, place, and time.   Psychiatric:         Attention and Perception: Attention normal.         Mood and Affect: Mood normal.         Speech: Speech normal.         Behavior: Behavior normal.         Thought Content: Thought content normal.         Judgment: Judgment normal.         /72   Pulse 61   Ht 167.6 cm (66\")   Wt 60.8 kg (134 lb)   SpO2 96%   BMI 21.63 kg/m²       ECG 12 Lead    Date/Time: 2/1/2024 9:36 AM  Performed by: August Rasmussen APRN    Authorized by: August Rasmussen APRN  Comparison: compared with previous ECG from 1/27/2023  Similar to previous ECG  Rhythm: sinus rhythm and " paced  Rate: normal  BPM: 61          Lab Review:     Pacemaker interrogation today in office (full report pending): Battery 8 years        Results for orders placed during the hospital encounter of 11/07/21    Adult Transthoracic Echo Complete W/ Cont if Necessary Per Protocol    Interpretation Summary  · Left ventricular ejection fraction appears to be 56 - 60%. Left ventricular systolic function is normal.  · Left atrial volume is severely increased.  · Moderate tricuspid valve regurgitation is present.  · The right atrial cavity is moderately dilated.  · Moderate pulmonary hypertension is present.      I have personally reviewed pacemaker interrogation, echo and past office notes prior to patients visit  Assessment:          Diagnosis Plan   1. Sick sinus syndrome        2. Presence of cardiac pacemaker        3. Paroxysmal atrial fibrillation        4. Chronic anticoagulation        5. Primary hypertension        6. Mixed hyperlipidemia        7. Pulmonary hypertension                 Plan:       1. Sick sinus syndrome: s/p pacemaker    2. Pacemaker: interrogation today showed normal device today. Battery was 8 years. RV pacing at 95%. Interrogation showed no episode.     3. Paroxysmal atrial fibrillation: none noted on recent interrogations. EKG today showed NSR rate 61.     4. Chronic anticoagulation: [id number] score 4. Patient is on Warfarin and denies any bleeding issues. INR is monitored by our coumadin clinic    5. Hypertension: acceptable in office today. Continue current medications.     6. Hyperlipidemia: Managed and followed by PCP. Continue simvastatin    7. Pulmonary hypertension: moderate on echo 11/2021.     I attest that all portions of this note reviewed and all information has been updated by myself to reflect the patient's current status.      I spent 35 minutes caring for Yudi on this date of service. This time includes time spent by me in the following activities:preparing for the  visit, reviewing tests, obtaining and/or reviewing a separately obtained history, performing a medically appropriate examination and/or evaluation , counseling and educating the patient/family/caregiver, ordering medications, tests, or procedures, and documenting information in the medical record    I spent 2 minutes on the separately reported service of EKG. This time is not included in the time used to support the E/M service also reported today.    Patient is to follow up in 6 months or sooner if needed

## 2024-05-07 ENCOUNTER — TRANSCRIBE ORDERS (OUTPATIENT)
Dept: GENERAL RADIOLOGY | Facility: HOSPITAL | Age: 89
End: 2024-05-07
Payer: MEDICARE

## 2024-05-07 ENCOUNTER — HOSPITAL ENCOUNTER (OUTPATIENT)
Dept: GENERAL RADIOLOGY | Facility: HOSPITAL | Age: 89
Discharge: HOME | End: 2024-05-07
Admitting: FAMILY MEDICINE
Payer: MEDICARE

## 2024-05-07 DIAGNOSIS — M79.604 RIGHT LEG PAIN: ICD-10-CM

## 2024-05-07 DIAGNOSIS — M79.604 RIGHT LEG PAIN: Primary | ICD-10-CM

## 2024-05-07 PROCEDURE — 73590 X-RAY EXAM OF LOWER LEG: CPT

## 2024-07-11 ENCOUNTER — OFFICE VISIT (OUTPATIENT)
Dept: CARDIOLOGY | Facility: CLINIC | Age: 89
End: 2024-07-11
Payer: MEDICARE

## 2024-07-11 VITALS
DIASTOLIC BLOOD PRESSURE: 69 MMHG | BODY MASS INDEX: 22.99 KG/M2 | HEIGHT: 65 IN | SYSTOLIC BLOOD PRESSURE: 156 MMHG | HEART RATE: 60 BPM | WEIGHT: 138 LBS

## 2024-07-11 DIAGNOSIS — I27.20 PULMONARY HYPERTENSION: ICD-10-CM

## 2024-07-11 DIAGNOSIS — I48.0 PAROXYSMAL ATRIAL FIBRILLATION: ICD-10-CM

## 2024-07-11 DIAGNOSIS — Z79.01 CHRONIC ANTICOAGULATION: ICD-10-CM

## 2024-07-11 DIAGNOSIS — E78.2 MIXED HYPERLIPIDEMIA: ICD-10-CM

## 2024-07-11 DIAGNOSIS — I49.5 SICK SINUS SYNDROME: Primary | ICD-10-CM

## 2024-07-11 DIAGNOSIS — I10 PRIMARY HYPERTENSION: ICD-10-CM

## 2024-07-11 DIAGNOSIS — Z95.0 PRESENCE OF CARDIAC PACEMAKER: ICD-10-CM

## 2024-07-11 NOTE — PROGRESS NOTES
Subjective:     Encounter Date: 07/11/2024      Patient ID: Yudi Westbrook is a 89 y.o. female with sick sinus syndrome s/p pacemaker, paroxysmal atrial fibrillation, chronic anticoagulation, hypertension, hyperlipidemia and pulmonary hypertension.    Chief Complaint: routine follow up    History of Present Illness  Patient presents today for management of atrial fibrillation. Today patient reports that she is doing pretty good. She reports that she been under some stress. She reports that she moved her 93 year old sister up here to live with her and take care of. She denies any chest pain. She denies any dyspnea on exertion. She denies any difficulty doing her housework and mowing her yard. She denies any heart racing or palpitations. Her BP has been running 130-150/70-80's she reports that her heart rate has been 60-70's. She denies any leg swelling, orthopnea or PND. She denies any dizziness or near syncope. Patient is on Warfarin and denies any bleeding issues. INR is monitored by Dr Preston. Patient follows with Dr Preston as PCP.     The following portions of the patient's history were reviewed and updated as appropriate: allergies, current medications, past family history, past medical history, past social history, past surgical history and problem list.    Allergies   Allergen Reactions    Morphine Unknown - Low Severity     Has not taken. But family members cannot take.    Penicillins Rash       Current Outpatient Medications:     BIOTIN 5000 PO, Take 1 tablet by mouth 1 (One) Time Per Week., Disp: , Rfl:     Calcium Carb-Cholecalciferol 600-200 MG-UNIT tablet, Take 1 tablet by mouth Daily., Disp: , Rfl:     fluticasone (FLONASE) 50 MCG/ACT nasal spray, 2 sprays into the nostril(s) as directed by provider Daily As Needed for Allergies. prn, Disp: , Rfl:     lisinopril (PRINIVIL,ZESTRIL) 10 MG tablet, Take 1 tablet by mouth Every Night., Disp: 90 tablet, Rfl: 3    lisinopril-hydrochlorothiazide  (PRINZIDE,ZESTORETIC) 10-12.5 MG per tablet, Take 1 tablet by mouth Every Morning., Disp: , Rfl:     Potassium 99 MG tablet, Take 1 tablet by mouth Daily., Disp: , Rfl:     simvastatin (ZOCOR) 20 MG tablet, Take 1 tablet by mouth Every Night., Disp: , Rfl:     vitamin B-12 (CYANOCOBALAMIN) 1000 MCG tablet, Take 1 tablet by mouth Daily., Disp: , Rfl:     Vitamin E 100 units tablet, Take 1 tablet by mouth Daily., Disp: , Rfl:     warfarin (COUMADIN) 2.5 MG tablet, TAKE 1 TABLET BY MOUTH ONCE DAILY EXCEPT SATURDAY, Disp: , Rfl:     Past Medical History:   Diagnosis Date    Atrial fibrillation     Bradycardia     Cancer 2002    Breast    Hyperlipidemia     Hypertension     Osteoporosis      Social History     Socioeconomic History    Marital status:    Tobacco Use    Smoking status: Never     Passive exposure: Never    Smokeless tobacco: Never   Vaping Use    Vaping status: Never Used   Substance and Sexual Activity    Alcohol use: No    Drug use: No    Sexual activity: Defer       Review of Systems   Constitutional: Negative for malaise/fatigue.   HENT:  Negative for nosebleeds.    Cardiovascular:  Negative for chest pain, dyspnea on exertion, irregular heartbeat, leg swelling, near-syncope, orthopnea, palpitations, paroxysmal nocturnal dyspnea and syncope.   Respiratory:  Negative for shortness of breath.    Hematologic/Lymphatic: Bruises/bleeds easily.   Genitourinary:  Negative for hematuria.   Neurological:  Negative for dizziness and weakness.   All other systems reviewed and are negative.         Objective:     Vitals reviewed.   Constitutional:       General: Not in acute distress.     Appearance: Normal appearance. Well-developed.   Eyes:      Pupils: Pupils are equal, round, and reactive to light.   HENT:      Head: Normocephalic and atraumatic.      Nose: Nose normal.   Neck:      Vascular: No carotid bruit.   Pulmonary:      Effort: Pulmonary effort is normal. No respiratory distress.      Breath  "sounds: Normal breath sounds. No wheezing. No rales.   Cardiovascular:      Normal rate. Regular rhythm.      Murmurs: There is no murmur.   Edema:     Peripheral edema absent.   Abdominal:      General: There is no distension.      Palpations: Abdomen is soft.   Musculoskeletal: Normal range of motion.      Cervical back: Normal range of motion and neck supple. Skin:     General: Skin is warm.      Findings: No erythema or rash.   Neurological:      General: No focal deficit present.      Mental Status: Alert and oriented to person, place, and time.   Psychiatric:         Attention and Perception: Attention normal.         Mood and Affect: Mood normal.         Speech: Speech normal.         Behavior: Behavior normal.         Thought Content: Thought content normal.         Judgment: Judgment normal.         /69   Pulse 60   Ht 165.1 cm (65\")   Wt 62.6 kg (138 lb)   BMI 22.96 kg/m²     Procedures    Lab Review:     Pacemaker interrogation 5/2024        Results for orders placed during the hospital encounter of 11/07/21    Adult Transthoracic Echo Complete W/ Cont if Necessary Per Protocol    Interpretation Summary  · Left ventricular ejection fraction appears to be 56 - 60%. Left ventricular systolic function is normal.  · Left atrial volume is severely increased.  · Moderate tricuspid valve regurgitation is present.  · The right atrial cavity is moderately dilated.  · Moderate pulmonary hypertension is present.      I have personally reviewed pacemaker interrogation, echo and past office notes prior to patients visit  Assessment:          Diagnosis Plan   1. Sick sinus syndrome        2. Presence of cardiac pacemaker        3. Paroxysmal atrial fibrillation        4. Chronic anticoagulation        5. Primary hypertension        6. Mixed hyperlipidemia        7. Pulmonary hypertension                   Plan:       1. Sick sinus syndrome: s/p pacemaker    2. Pacemaker: interrogation 5/2024 showed normal " device today. Battery was 7 years 6 months. RV pacing at 98%. Interrogation showed no episode.     3. Paroxysmal atrial fibrillation: none noted on recent interrogations. Regular rhythm on examination today.     4. Chronic anticoagulation: [id number] score 4. Patient is on Warfarin and denies any bleeding issues. INR is monitored by Dr Preston    5. Hypertension: acceptable in office today. Continue current medications.     6. Hyperlipidemia: Managed and followed by PCP. Continue simvastatin    7. Pulmonary hypertension: moderate on echo 11/2021.     I attest that all portions of this note reviewed and all information has been updated by myself to reflect the patient's current status.      I spent 35 minutes caring for Yudi on this date of service. This time includes time spent by me in the following activities:preparing for the visit, reviewing tests, obtaining and/or reviewing a separately obtained history, performing a medically appropriate examination and/or evaluation , counseling and educating the patient/family/caregiver, and documenting information in the medical record    Patient is to follow up in 6 months or sooner if needed

## 2024-08-12 ENCOUNTER — TRANSCRIBE ORDERS (OUTPATIENT)
Dept: ADMINISTRATIVE | Facility: HOSPITAL | Age: 89
End: 2024-08-12
Payer: MEDICARE

## 2024-08-12 ENCOUNTER — HOSPITAL ENCOUNTER (OUTPATIENT)
Dept: GENERAL RADIOLOGY | Facility: HOSPITAL | Age: 89
Discharge: HOME | End: 2024-08-12
Admitting: FAMILY MEDICINE
Payer: MEDICARE

## 2024-08-12 DIAGNOSIS — M25.552 PAIN IN LEFT HIP: Primary | ICD-10-CM

## 2024-08-12 PROCEDURE — 73502 X-RAY EXAM HIP UNI 2-3 VIEWS: CPT

## 2024-12-12 ENCOUNTER — OFFICE VISIT (OUTPATIENT)
Dept: CARDIOLOGY | Facility: CLINIC | Age: 89
End: 2024-12-12
Payer: MEDICARE

## 2024-12-12 VITALS
DIASTOLIC BLOOD PRESSURE: 72 MMHG | WEIGHT: 133 LBS | SYSTOLIC BLOOD PRESSURE: 150 MMHG | HEIGHT: 65 IN | HEART RATE: 69 BPM | BODY MASS INDEX: 22.16 KG/M2

## 2024-12-12 DIAGNOSIS — Z79.01 CHRONIC ANTICOAGULATION: ICD-10-CM

## 2024-12-12 DIAGNOSIS — I10 PRIMARY HYPERTENSION: ICD-10-CM

## 2024-12-12 DIAGNOSIS — I49.5 SICK SINUS SYNDROME: Primary | ICD-10-CM

## 2024-12-12 DIAGNOSIS — I27.20 PULMONARY HYPERTENSION: ICD-10-CM

## 2024-12-12 DIAGNOSIS — Z95.0 PRESENCE OF CARDIAC PACEMAKER: ICD-10-CM

## 2024-12-12 DIAGNOSIS — I48.0 PAROXYSMAL ATRIAL FIBRILLATION: ICD-10-CM

## 2024-12-12 DIAGNOSIS — E78.2 MIXED HYPERLIPIDEMIA: ICD-10-CM

## 2024-12-12 RX ORDER — DOXYCYCLINE 100 MG/1
1 CAPSULE ORAL EVERY 12 HOURS SCHEDULED
COMMUNITY
Start: 2024-12-09

## 2024-12-12 RX ORDER — PANTOPRAZOLE SODIUM 40 MG/1
40 TABLET, DELAYED RELEASE ORAL EVERY MORNING
COMMUNITY

## 2024-12-12 RX ORDER — ESCITALOPRAM OXALATE 20 MG/1
1 TABLET ORAL DAILY
COMMUNITY
Start: 2024-11-04

## 2024-12-12 RX ORDER — BENZONATATE 100 MG/1
100 CAPSULE ORAL 3 TIMES DAILY PRN
COMMUNITY
Start: 2024-12-09

## 2024-12-12 RX ORDER — GUAIFENESIN 600 MG/1
600 TABLET, EXTENDED RELEASE ORAL
COMMUNITY
Start: 2024-11-11

## 2024-12-12 NOTE — PROGRESS NOTES
Subjective:     Encounter Date: 12/12/2024      Patient ID: Yudi Westbrook is a 89 y.o. female with sick sinus syndrome s/p pacemaker, paroxysmal atrial fibrillation, chronic anticoagulation, hypertension, hyperlipidemia and pulmonary hypertension.    Chief Complaint: no complaints  History of Present Illness  Patient presents today for management of atrial fibrillation. Today patient reports that she is doing pretty well. She reports that she moved her 93 year old sister in about 6-12 months ago. She reports that it is difficult to get along with her. She reports that she has been battling bronchitis for the past couple of weeks. She denies any chest pain. She denies any dyspnea on exertion. She denies any difficulty doing her housework and mowing her yard. She denies any heart racing or palpitations. Her BP has been running 130-150/70-80's she reports that her heart rate has been 60-70's. She denies any leg swelling, orthopnea or PND. She denies any dizziness or near syncope. Patient is on Warfarin and denies any bleeding issues. INR is monitored by Dr Preston. She reports her INR was 2.2 last. Patient follows with Dr Preston as PCP.     The following portions of the patient's history were reviewed and updated as appropriate: allergies, current medications, past family history, past medical history, past social history, past surgical history and problem list.    Allergies   Allergen Reactions    Morphine Unknown - Low Severity     Has not taken. But family members cannot take.    Penicillins Rash       Current Outpatient Medications:     benzonatate (TESSALON) 100 MG capsule, Take 1 capsule by mouth 3 (Three) Times a Day As Needed for Cough., Disp: , Rfl:     BIOTIN 5000 PO, Take 1 tablet by mouth 1 (One) Time Per Week., Disp: , Rfl:     Calcium Carb-Cholecalciferol 600-200 MG-UNIT tablet, Take 1 tablet by mouth Daily., Disp: , Rfl:     doxycycline (VIBRAMYCIN) 100 MG capsule, Take 1 capsule by mouth  Every 12 (Twelve) Hours., Disp: , Rfl:     escitalopram (LEXAPRO) 20 MG tablet, Take 1 tablet by mouth Daily., Disp: , Rfl:     fluticasone (FLONASE) 50 MCG/ACT nasal spray, Administer 2 sprays into the nostril(s) as directed by provider Daily As Needed for Allergies. prn, Disp: , Rfl:     lisinopril (PRINIVIL,ZESTRIL) 10 MG tablet, Take 1 tablet by mouth Every Night., Disp: 90 tablet, Rfl: 3    lisinopril-hydrochlorothiazide (PRINZIDE,ZESTORETIC) 10-12.5 MG per tablet, Take 1 tablet by mouth Every Morning., Disp: , Rfl:     Mucus Relief 600 MG 12 hr tablet, 1 tablet., Disp: , Rfl:     pantoprazole (PROTONIX) 40 MG EC tablet, Take 1 tablet by mouth Every Morning., Disp: , Rfl:     simvastatin (ZOCOR) 20 MG tablet, Take 1 tablet by mouth Every Night., Disp: , Rfl:     vitamin B-12 (CYANOCOBALAMIN) 1000 MCG tablet, Take 1 tablet by mouth Daily., Disp: , Rfl:     Vitamin E 100 units tablet, Take 1 tablet by mouth Daily., Disp: , Rfl:     warfarin (COUMADIN) 2.5 MG tablet, TAKE 1 TABLET BY MOUTH ONCE DAILY EXCEPT SATURDAY, Disp: , Rfl:     Potassium 99 MG tablet, Take 1 tablet by mouth Daily. (Patient not taking: Reported on 12/12/2024), Disp: , Rfl:     Past Medical History:   Diagnosis Date    Atrial fibrillation     Bradycardia     Cancer 2002    Breast    Hyperlipidemia     Hypertension     Osteoporosis      Social History     Socioeconomic History    Marital status:    Tobacco Use    Smoking status: Never     Passive exposure: Never    Smokeless tobacco: Never   Vaping Use    Vaping status: Never Used   Substance and Sexual Activity    Alcohol use: No    Drug use: No    Sexual activity: Defer       Review of Systems   Constitutional: Negative for malaise/fatigue.   HENT:  Negative for nosebleeds.    Cardiovascular:  Negative for chest pain, dyspnea on exertion, irregular heartbeat, leg swelling, near-syncope, orthopnea, palpitations, paroxysmal nocturnal dyspnea and syncope.   Respiratory:  Negative for  "shortness of breath.    Hematologic/Lymphatic: Bruises/bleeds easily.   Genitourinary:  Negative for hematuria.   Neurological:  Negative for dizziness and weakness.   All other systems reviewed and are negative.         Objective:     Vitals reviewed.   Constitutional:       General: Not in acute distress.     Appearance: Normal appearance. Well-developed.   Eyes:      Pupils: Pupils are equal, round, and reactive to light.   HENT:      Head: Normocephalic and atraumatic.      Nose: Nose normal.   Neck:      Vascular: No carotid bruit.   Pulmonary:      Effort: Pulmonary effort is normal. No respiratory distress.      Breath sounds: Normal breath sounds. No wheezing. No rales.   Cardiovascular:      Normal rate. Regular rhythm.      Murmurs: There is no murmur.   Edema:     Peripheral edema absent.   Abdominal:      General: There is no distension.      Palpations: Abdomen is soft.   Musculoskeletal: Normal range of motion.      Cervical back: Normal range of motion and neck supple. Skin:     General: Skin is warm.      Findings: No erythema or rash.   Neurological:      General: No focal deficit present.      Mental Status: Alert and oriented to person, place, and time.   Psychiatric:         Attention and Perception: Attention normal.         Mood and Affect: Mood normal.         Speech: Speech normal.         Behavior: Behavior normal.         Thought Content: Thought content normal.         Judgment: Judgment normal.         /72   Pulse 69   Ht 165.1 cm (65\")   Wt 60.3 kg (133 lb)   BMI 22.13 kg/m²     Procedures    Lab Review:     Pacemaker interrogation 10/2024        Results for orders placed during the hospital encounter of 11/07/21    Adult Transthoracic Echo Complete W/ Cont if Necessary Per Protocol    Interpretation Summary  · Left ventricular ejection fraction appears to be 56 - 60%. Left ventricular systolic function is normal.  · Left atrial volume is severely increased.  · Moderate " tricuspid valve regurgitation is present.  · The right atrial cavity is moderately dilated.  · Moderate pulmonary hypertension is present.      I have personally reviewed pacemaker interrogation, echo and past office notes prior to patients visit  Assessment:          Diagnosis Plan   1. Sick sinus syndrome        2. Presence of cardiac pacemaker        3. Paroxysmal atrial fibrillation        4. Chronic anticoagulation        5. Primary hypertension        6. Mixed hyperlipidemia        7. Pulmonary hypertension                     Plan:       1. Sick sinus syndrome: s/p pacemaker    2. Pacemaker: interrogation 10/2024 showed normal device today. Battery was 7 years. RV pacing at 98%. Interrogation showed 2 episodes of nonsustained VT with longest lasting 5 seconds in August.    3. Paroxysmal atrial fibrillation: none noted on recent interrogations. Regular rhythm on examination today.     4. Chronic anticoagulation: [id number] score 4. Patient is on Warfarin and denies any bleeding issues. INR is monitored by Dr Preston    5. Hypertension: acceptable in office today. Continue current medications.     6. Hyperlipidemia: Managed and followed by PCP. Continue simvastatin    7. Pulmonary hypertension: moderate on echo 11/2021.     I attest that all portions of this note reviewed and all information has been updated by myself to reflect the patient's current status.      I spent 35 minutes caring for Yudi on this date of service. This time includes time spent by me in the following activities:preparing for the visit, reviewing tests, obtaining and/or reviewing a separately obtained history, performing a medically appropriate examination and/or evaluation , counseling and educating the patient/family/caregiver, and documenting information in the medical record    Patient is to follow up in 6 months or sooner if needed

## 2025-01-29 RX ORDER — LISINOPRIL 10 MG/1
10 TABLET ORAL NIGHTLY
Qty: 90 TABLET | Refills: 3 | Status: SHIPPED | OUTPATIENT
Start: 2025-01-29

## 2025-06-12 ENCOUNTER — OFFICE VISIT (OUTPATIENT)
Dept: CARDIOLOGY | Facility: CLINIC | Age: OVER 89
End: 2025-06-12
Payer: MEDICARE

## 2025-06-12 VITALS
SYSTOLIC BLOOD PRESSURE: 159 MMHG | RESPIRATION RATE: 18 BRPM | HEIGHT: 65 IN | BODY MASS INDEX: 23.63 KG/M2 | DIASTOLIC BLOOD PRESSURE: 74 MMHG | HEART RATE: 60 BPM | WEIGHT: 141.8 LBS

## 2025-06-12 DIAGNOSIS — I48.0 PAROXYSMAL ATRIAL FIBRILLATION: ICD-10-CM

## 2025-06-12 DIAGNOSIS — I49.5 SICK SINUS SYNDROME: Primary | ICD-10-CM

## 2025-06-12 DIAGNOSIS — Z79.01 CHRONIC ANTICOAGULATION: ICD-10-CM

## 2025-06-12 DIAGNOSIS — I27.20 PULMONARY HYPERTENSION: ICD-10-CM

## 2025-06-12 DIAGNOSIS — E78.2 MIXED HYPERLIPIDEMIA: ICD-10-CM

## 2025-06-12 DIAGNOSIS — I10 PRIMARY HYPERTENSION: ICD-10-CM

## 2025-06-12 DIAGNOSIS — Z95.0 PRESENCE OF CARDIAC PACEMAKER: ICD-10-CM

## 2025-06-12 NOTE — PROGRESS NOTES
Subjective:     Encounter Date: 06/12/2025      Patient ID: Yudi Westbrook is a 89 y.o. female with sick sinus syndrome s/p pacemaker, paroxysmal atrial fibrillation, chronic anticoagulation, hypertension, hyperlipidemia and pulmonary hypertension.    Chief Complaint: no complaints  History of Present Illness  Patient presents today for management of atrial fibrillation. Today patient reports that she is doing pretty well. She reports that she moved her almost 94 year old sister in with her. She has been stressed about her walking around without her walker. She fell recently and broke her arm. Patient reports that she struggles to get along with her. She denies any chest pain. She reports some mild dyspnea on exertion with walking in the office and mowing her yard. She denies any difficulty doing her housework and mowing her yard. She denies any heart racing or palpitations. Her BP has been running 130-150/70-80's she reports that her heart rate has been 60-70's. She denies any leg swelling, orthopnea or PND. She denies any dizziness or near syncope. Patient is on Warfarin and denies any bleeding issues. INR is monitored by Dr Preston. She reports her INR was 2.4 yesterday. Patient follows with Dr Preston as PCP.     The following portions of the patient's history were reviewed and updated as appropriate: allergies, current medications, past family history, past medical history, past social history, past surgical history and problem list.    Allergies   Allergen Reactions    Morphine Unknown - Low Severity     Has not taken. But family members cannot take.    Penicillins Rash       Current Outpatient Medications:     benzonatate (TESSALON) 100 MG capsule, Take 1 capsule by mouth 3 (Three) Times a Day As Needed for Cough., Disp: , Rfl:     BIOTIN 5000 PO, Take 1 tablet by mouth 1 (One) Time Per Week., Disp: , Rfl:     Calcium Carb-Cholecalciferol 600-200 MG-UNIT tablet, Take 1 tablet by mouth Daily., Disp:  , Rfl:     doxycycline (VIBRAMYCIN) 100 MG capsule, Take 1 capsule by mouth Every 12 (Twelve) Hours., Disp: , Rfl:     escitalopram (LEXAPRO) 20 MG tablet, Take 1 tablet by mouth Daily., Disp: , Rfl:     fluticasone (FLONASE) 50 MCG/ACT nasal spray, Administer 2 sprays into the nostril(s) as directed by provider Daily As Needed for Allergies. prn, Disp: , Rfl:     lisinopril (PRINIVIL,ZESTRIL) 10 MG tablet, TAKE 1 TABLET BY MOUTH ONCE DAILY AT NIGHT, Disp: 90 tablet, Rfl: 3    lisinopril-hydrochlorothiazide (PRINZIDE,ZESTORETIC) 10-12.5 MG per tablet, Take 1 tablet by mouth Every Morning., Disp: , Rfl:     Mucus Relief 600 MG 12 hr tablet, 1 tablet., Disp: , Rfl:     pantoprazole (PROTONIX) 40 MG EC tablet, Take 1 tablet by mouth Every Morning., Disp: , Rfl:     simvastatin (ZOCOR) 20 MG tablet, Take 1 tablet by mouth Every Night., Disp: , Rfl:     vitamin B-12 (CYANOCOBALAMIN) 1000 MCG tablet, Take 1 tablet by mouth Daily., Disp: , Rfl:     Vitamin E 100 units tablet, Take 1 tablet by mouth Daily., Disp: , Rfl:     warfarin (COUMADIN) 2.5 MG tablet, TAKE 1 TABLET BY MOUTH ONCE DAILY EXCEPT SATURDAY, Disp: , Rfl:     Past Medical History:   Diagnosis Date    Atrial fibrillation     Bradycardia     Cancer 2002    Breast    Hyperlipidemia     Hypertension     Osteoporosis      Social History     Socioeconomic History    Marital status:    Tobacco Use    Smoking status: Never     Passive exposure: Never    Smokeless tobacco: Never   Vaping Use    Vaping status: Never Used   Substance and Sexual Activity    Alcohol use: No    Drug use: No    Sexual activity: Defer       Review of Systems   Constitutional: Negative for malaise/fatigue.   HENT:  Negative for nosebleeds.    Cardiovascular:  Negative for chest pain, dyspnea on exertion, irregular heartbeat, leg swelling, near-syncope, orthopnea, palpitations, paroxysmal nocturnal dyspnea and syncope.   Respiratory:  Negative for shortness of breath.   "  Hematologic/Lymphatic: Bruises/bleeds easily.   Genitourinary:  Negative for hematuria.   Neurological:  Negative for dizziness and weakness.   All other systems reviewed and are negative.         Objective:     Vitals reviewed.   Constitutional:       General: Not in acute distress.     Appearance: Normal appearance. Well-developed.   Eyes:      Pupils: Pupils are equal, round, and reactive to light.   HENT:      Head: Normocephalic and atraumatic.      Nose: Nose normal.   Neck:      Vascular: No carotid bruit.   Pulmonary:      Effort: Pulmonary effort is normal. No respiratory distress.      Breath sounds: Normal breath sounds. No wheezing. No rales.   Cardiovascular:      Normal rate. Regular rhythm.      Murmurs: There is no murmur.   Edema:     Peripheral edema absent.   Abdominal:      General: There is no distension.      Palpations: Abdomen is soft.   Musculoskeletal: Normal range of motion.      Cervical back: Normal range of motion and neck supple. Skin:     General: Skin is warm.      Findings: No erythema or rash.   Neurological:      General: No focal deficit present.      Mental Status: Alert and oriented to person, place, and time.   Psychiatric:         Attention and Perception: Attention normal.         Mood and Affect: Mood normal.         Speech: Speech normal.         Behavior: Behavior normal.         Thought Content: Thought content normal.         Judgment: Judgment normal.         /74 (BP Location: Right arm, Patient Position: Sitting, Cuff Size: Adult)   Pulse 60   Resp 18   Ht 165.1 cm (65\")   Wt 64.3 kg (141 lb 12.8 oz)   BMI 23.60 kg/m²     Procedures    Lab Review:     Pacemaker interrogation 04/2025        Results for orders placed during the hospital encounter of 11/07/21    Adult Transthoracic Echo Complete W/ Cont if Necessary Per Protocol    Interpretation Summary  · Left ventricular ejection fraction appears to be 56 - 60%. Left ventricular systolic function is " normal.  · Left atrial volume is severely increased.  · Moderate tricuspid valve regurgitation is present.  · The right atrial cavity is moderately dilated.  · Moderate pulmonary hypertension is present.      I have personally reviewed pacemaker interrogation, echo and past office notes prior to patients visit  Assessment:          Diagnosis Plan   1. Sick sinus syndrome        2. Presence of cardiac pacemaker        3. Paroxysmal atrial fibrillation        4. Chronic anticoagulation        5. Primary hypertension        6. Mixed hyperlipidemia        7. Pulmonary hypertension                       Plan:       1. Sick sinus syndrome: s/p pacemaker    2. Pacemaker: interrogation 04/2025 showed normal device today. Battery was 6 years 6 months. RV pacing at 98%. Interrogation showed 2 episodes of nonsustained VT with longest lasting 14 seconds on 4/12/2025.    3. Paroxysmal atrial fibrillation: none noted on recent interrogations. Regular rhythm on examination today.     4. Chronic anticoagulation: [id number] score 4. Patient is on Warfarin and denies any bleeding issues. INR is monitored by Dr Preston    5. Hypertension: acceptable in office today. Continue current medications.     6. Hyperlipidemia: Managed and followed by PCP. Continue simvastatin    7. Pulmonary hypertension: moderate on echo 11/2021.     Advance Care Planning   ACP discussion was held with the patient during this visit. Patient has an advance directive (not in EMR), copy requested.     I attest that all portions of this note reviewed and all information has been updated by myself to reflect the patient's current status.      I spent 35 minutes caring for Yudi on this date of service. This time includes time spent by me in the following activities:preparing for the visit, reviewing tests, obtaining and/or reviewing a separately obtained history, performing a medically appropriate examination and/or evaluation , counseling and educating the  patient/family/caregiver, and documenting information in the medical record    Patient is to follow up in 6 months or sooner if needed

## 2025-07-14 ENCOUNTER — HOSPITAL ENCOUNTER (INPATIENT)
Facility: HOSPITAL | Age: OVER 89
LOS: 2 days | Discharge: HOME | End: 2025-07-16
Attending: EMERGENCY MEDICINE | Admitting: FAMILY MEDICINE
Payer: MEDICARE

## 2025-07-14 ENCOUNTER — APPOINTMENT (OUTPATIENT)
Dept: GENERAL RADIOLOGY | Facility: HOSPITAL | Age: OVER 89
End: 2025-07-14
Payer: MEDICARE

## 2025-07-14 DIAGNOSIS — I50.9 CONGESTIVE HEART FAILURE, UNSPECIFIED HF CHRONICITY, UNSPECIFIED HEART FAILURE TYPE: Primary | ICD-10-CM

## 2025-07-14 LAB
ALBUMIN SERPL-MCNC: 4.1 G/DL (ref 3.5–5.2)
ALBUMIN/GLOB SERPL: 1.5 G/DL
ALP SERPL-CCNC: 68 U/L (ref 39–117)
ALT SERPL W P-5'-P-CCNC: 13 U/L (ref 1–33)
ANION GAP SERPL CALCULATED.3IONS-SCNC: 12 MMOL/L (ref 5–15)
AST SERPL-CCNC: 20 U/L (ref 1–32)
BASOPHILS # BLD AUTO: 0.08 10*3/MM3 (ref 0–0.2)
BASOPHILS NFR BLD AUTO: 1.6 % (ref 0–1.5)
BILIRUB SERPL-MCNC: 1.3 MG/DL (ref 0–1.2)
BUN SERPL-MCNC: 10.6 MG/DL (ref 8–23)
BUN/CREAT SERPL: 15.1 (ref 7–25)
CALCIUM SPEC-SCNC: 9.8 MG/DL (ref 8.2–9.6)
CHLORIDE SERPL-SCNC: 103 MMOL/L (ref 98–107)
CO2 SERPL-SCNC: 22 MMOL/L (ref 22–29)
CREAT SERPL-MCNC: 0.7 MG/DL (ref 0.57–1)
D DIMER PPP FEU-MCNC: <0.27 MCGFEU/ML (ref 0–0.93)
DEPRECATED RDW RBC AUTO: 47.7 FL (ref 37–54)
EGFRCR SERPLBLD CKD-EPI 2021: 80.8 ML/MIN/1.73
EOSINOPHIL # BLD AUTO: 0.12 10*3/MM3 (ref 0–0.4)
EOSINOPHIL NFR BLD AUTO: 2.4 % (ref 0.3–6.2)
ERYTHROCYTE [DISTWIDTH] IN BLOOD BY AUTOMATED COUNT: 14 % (ref 12.3–15.4)
GEN 5 1HR TROPONIN T REFLEX: 21 NG/L
GLOBULIN UR ELPH-MCNC: 2.8 GM/DL
GLUCOSE SERPL-MCNC: 99 MG/DL (ref 65–99)
HCT VFR BLD AUTO: 34 % (ref 34–46.6)
HGB BLD-MCNC: 10.9 G/DL (ref 12–15.9)
HOLD SPECIMEN: NORMAL
HOLD SPECIMEN: NORMAL
IMM GRANULOCYTES # BLD AUTO: 0.02 10*3/MM3 (ref 0–0.05)
IMM GRANULOCYTES NFR BLD AUTO: 0.4 % (ref 0–0.5)
INR PPP: 2.51 (ref 0.91–1.09)
INR PPP: 2.6 (ref 0.91–1.09)
LYMPHOCYTES # BLD AUTO: 1.27 10*3/MM3 (ref 0.7–3.1)
LYMPHOCYTES NFR BLD AUTO: 25 % (ref 19.6–45.3)
MCH RBC QN AUTO: 30.4 PG (ref 26.6–33)
MCHC RBC AUTO-ENTMCNC: 32.1 G/DL (ref 31.5–35.7)
MCV RBC AUTO: 95 FL (ref 79–97)
MONOCYTES # BLD AUTO: 0.56 10*3/MM3 (ref 0.1–0.9)
MONOCYTES NFR BLD AUTO: 11 % (ref 5–12)
NEUTROPHILS NFR BLD AUTO: 3.03 10*3/MM3 (ref 1.7–7)
NEUTROPHILS NFR BLD AUTO: 59.6 % (ref 42.7–76)
NRBC BLD AUTO-RTO: 0 /100 WBC (ref 0–0.2)
NT-PROBNP SERPL-MCNC: 1901 PG/ML (ref 0–1800)
PLATELET # BLD AUTO: 180 10*3/MM3 (ref 140–450)
PMV BLD AUTO: 10.1 FL (ref 6–12)
POTASSIUM SERPL-SCNC: 4.4 MMOL/L (ref 3.5–5.2)
PROT SERPL-MCNC: 6.9 G/DL (ref 6–8.5)
PROTHROMBIN TIME: 28.6 SECONDS (ref 11.8–14.8)
PROTHROMBIN TIME: 29.4 SECONDS (ref 11.8–14.8)
RBC # BLD AUTO: 3.58 10*6/MM3 (ref 3.77–5.28)
SODIUM SERPL-SCNC: 137 MMOL/L (ref 136–145)
TROPONIN T % DELTA: -9
TROPONIN T NUMERIC DELTA: -2 NG/L
TROPONIN T SERPL HS-MCNC: 23 NG/L
WBC NRBC COR # BLD AUTO: 5.08 10*3/MM3 (ref 3.4–10.8)
WHOLE BLOOD HOLD COAG: NORMAL
WHOLE BLOOD HOLD SPECIMEN: NORMAL

## 2025-07-14 PROCEDURE — 36415 COLL VENOUS BLD VENIPUNCTURE: CPT

## 2025-07-14 PROCEDURE — 80053 COMPREHEN METABOLIC PANEL: CPT | Performed by: EMERGENCY MEDICINE

## 2025-07-14 PROCEDURE — 94761 N-INVAS EAR/PLS OXIMETRY MLT: CPT

## 2025-07-14 PROCEDURE — 85379 FIBRIN DEGRADATION QUANT: CPT | Performed by: EMERGENCY MEDICINE

## 2025-07-14 PROCEDURE — 71045 X-RAY EXAM CHEST 1 VIEW: CPT

## 2025-07-14 PROCEDURE — 25010000002 FUROSEMIDE PER 20 MG: Performed by: EMERGENCY MEDICINE

## 2025-07-14 PROCEDURE — 93010 ELECTROCARDIOGRAM REPORT: CPT | Performed by: INTERNAL MEDICINE

## 2025-07-14 PROCEDURE — 85025 COMPLETE CBC W/AUTO DIFF WBC: CPT

## 2025-07-14 PROCEDURE — 85610 PROTHROMBIN TIME: CPT | Performed by: EMERGENCY MEDICINE

## 2025-07-14 PROCEDURE — 93005 ELECTROCARDIOGRAM TRACING: CPT | Performed by: EMERGENCY MEDICINE

## 2025-07-14 PROCEDURE — 93005 ELECTROCARDIOGRAM TRACING: CPT

## 2025-07-14 PROCEDURE — 83880 ASSAY OF NATRIURETIC PEPTIDE: CPT | Performed by: EMERGENCY MEDICINE

## 2025-07-14 PROCEDURE — 85610 PROTHROMBIN TIME: CPT | Performed by: FAMILY MEDICINE

## 2025-07-14 PROCEDURE — 84484 ASSAY OF TROPONIN QUANT: CPT | Performed by: EMERGENCY MEDICINE

## 2025-07-14 PROCEDURE — 99285 EMERGENCY DEPT VISIT HI MDM: CPT | Performed by: EMERGENCY MEDICINE

## 2025-07-14 RX ORDER — ACETAMINOPHEN 650 MG/1
650 SUPPOSITORY RECTAL EVERY 4 HOURS PRN
Status: DISCONTINUED | OUTPATIENT
Start: 2025-07-14 | End: 2025-07-16 | Stop reason: HOSPADM

## 2025-07-14 RX ORDER — WARFARIN SODIUM 2.5 MG/1
2.5 TABLET ORAL
Status: DISCONTINUED | OUTPATIENT
Start: 2025-07-19 | End: 2025-07-16 | Stop reason: HOSPADM

## 2025-07-14 RX ORDER — PANTOPRAZOLE SODIUM 40 MG/1
40 TABLET, DELAYED RELEASE ORAL
Status: DISCONTINUED | OUTPATIENT
Start: 2025-07-15 | End: 2025-07-16 | Stop reason: HOSPADM

## 2025-07-14 RX ORDER — ACETAMINOPHEN 160 MG/5ML
650 SOLUTION ORAL EVERY 4 HOURS PRN
Status: DISCONTINUED | OUTPATIENT
Start: 2025-07-14 | End: 2025-07-16 | Stop reason: HOSPADM

## 2025-07-14 RX ORDER — WARFARIN SODIUM 7.5 MG/1
7.5 TABLET ORAL
Status: DISCONTINUED | OUTPATIENT
Start: 2025-07-15 | End: 2025-07-16 | Stop reason: HOSPADM

## 2025-07-14 RX ORDER — FLUTICASONE PROPIONATE 50 MCG
2 SPRAY, SUSPENSION (ML) NASAL DAILY PRN
Status: DISCONTINUED | OUTPATIENT
Start: 2025-07-14 | End: 2025-07-16 | Stop reason: HOSPADM

## 2025-07-14 RX ORDER — AMOXICILLIN 250 MG
2 CAPSULE ORAL 2 TIMES DAILY PRN
Status: DISCONTINUED | OUTPATIENT
Start: 2025-07-14 | End: 2025-07-16 | Stop reason: HOSPADM

## 2025-07-14 RX ORDER — SODIUM CHLORIDE 0.9 % (FLUSH) 0.9 %
10 SYRINGE (ML) INJECTION AS NEEDED
Status: DISCONTINUED | OUTPATIENT
Start: 2025-07-14 | End: 2025-07-16 | Stop reason: HOSPADM

## 2025-07-14 RX ORDER — ATORVASTATIN CALCIUM 10 MG/1
20 TABLET, FILM COATED ORAL NIGHTLY
Status: DISCONTINUED | OUTPATIENT
Start: 2025-07-14 | End: 2025-07-16 | Stop reason: HOSPADM

## 2025-07-14 RX ORDER — ACETAMINOPHEN 325 MG/1
650 TABLET ORAL EVERY 4 HOURS PRN
Status: DISCONTINUED | OUTPATIENT
Start: 2025-07-14 | End: 2025-07-16 | Stop reason: HOSPADM

## 2025-07-14 RX ORDER — BENZONATATE 100 MG/1
100 CAPSULE ORAL 3 TIMES DAILY PRN
Status: DISCONTINUED | OUTPATIENT
Start: 2025-07-14 | End: 2025-07-16 | Stop reason: HOSPADM

## 2025-07-14 RX ORDER — LISINOPRIL 10 MG/1
10 TABLET ORAL NIGHTLY
Status: ON HOLD | COMMUNITY
End: 2025-07-16

## 2025-07-14 RX ORDER — POLYETHYLENE GLYCOL 3350 17 G/17G
17 POWDER, FOR SOLUTION ORAL DAILY PRN
Status: DISCONTINUED | OUTPATIENT
Start: 2025-07-14 | End: 2025-07-16 | Stop reason: HOSPADM

## 2025-07-14 RX ORDER — LABETALOL HYDROCHLORIDE 5 MG/ML
20 INJECTION, SOLUTION INTRAVENOUS
Status: DISCONTINUED | OUTPATIENT
Start: 2025-07-14 | End: 2025-07-16 | Stop reason: HOSPADM

## 2025-07-14 RX ORDER — BISACODYL 5 MG/1
5 TABLET, DELAYED RELEASE ORAL DAILY PRN
Status: DISCONTINUED | OUTPATIENT
Start: 2025-07-14 | End: 2025-07-16 | Stop reason: HOSPADM

## 2025-07-14 RX ORDER — SODIUM CHLORIDE 0.9 % (FLUSH) 0.9 %
10 SYRINGE (ML) INJECTION EVERY 12 HOURS SCHEDULED
Status: DISCONTINUED | OUTPATIENT
Start: 2025-07-14 | End: 2025-07-16 | Stop reason: HOSPADM

## 2025-07-14 RX ORDER — ESCITALOPRAM OXALATE 10 MG/1
20 TABLET ORAL DAILY
Status: DISCONTINUED | OUTPATIENT
Start: 2025-07-15 | End: 2025-07-16 | Stop reason: HOSPADM

## 2025-07-14 RX ORDER — LISINOPRIL 10 MG/1
10 TABLET ORAL NIGHTLY
Status: DISCONTINUED | OUTPATIENT
Start: 2025-07-14 | End: 2025-07-16 | Stop reason: HOSPADM

## 2025-07-14 RX ORDER — WARFARIN SODIUM 2.5 MG/1
2.5 TABLET ORAL
COMMUNITY

## 2025-07-14 RX ORDER — FUROSEMIDE 10 MG/ML
40 INJECTION INTRAMUSCULAR; INTRAVENOUS ONCE
Status: COMPLETED | OUTPATIENT
Start: 2025-07-14 | End: 2025-07-14

## 2025-07-14 RX ORDER — SODIUM CHLORIDE 9 MG/ML
40 INJECTION, SOLUTION INTRAVENOUS AS NEEDED
Status: DISCONTINUED | OUTPATIENT
Start: 2025-07-14 | End: 2025-07-16 | Stop reason: HOSPADM

## 2025-07-14 RX ORDER — BISACODYL 10 MG
10 SUPPOSITORY, RECTAL RECTAL DAILY PRN
Status: DISCONTINUED | OUTPATIENT
Start: 2025-07-14 | End: 2025-07-16 | Stop reason: HOSPADM

## 2025-07-14 RX ORDER — WARFARIN SODIUM 5 MG/1
5 TABLET ORAL NIGHTLY
COMMUNITY

## 2025-07-14 RX ADMIN — Medication 10 ML: at 20:51

## 2025-07-14 RX ADMIN — Medication 20 MG: at 19:53

## 2025-07-14 RX ADMIN — ATORVASTATIN CALCIUM 20 MG: 10 TABLET ORAL at 20:51

## 2025-07-14 RX ADMIN — LISINOPRIL 10 MG: 10 TABLET ORAL at 20:51

## 2025-07-14 RX ADMIN — FUROSEMIDE 40 MG: 10 INJECTION, SOLUTION INTRAVENOUS at 15:52

## 2025-07-14 NOTE — ED PROVIDER NOTES
Subjective   History of Present Illness  Patient is a 89-year-old who came to the ER complaint of shortness of breath and is hypertensive.    Shortness of Breath  Severity:  Moderate  Onset quality:  Gradual  Timing:  Constant  Progression:  Worsening  Chronicity:  New  Context: not activity, not animal exposure, not emotional upset, not known allergens, not occupational exposure, not pollens, not strong odors, not URI and not weather changes    Relieved by:  Nothing  Worsened by:  Nothing  Ineffective treatments:  None tried  Associated symptoms: cough and PND    Associated symptoms: no abdominal pain, no chest pain, no claudication, no diaphoresis, no headaches, no hemoptysis, no neck pain, no sore throat, no sputum production, no vomiting and no wheezing    Risk factors: no family hx of DVT and no obesity        Review of Systems   Constitutional: Negative.  Negative for diaphoresis.   HENT: Negative.  Negative for sore throat.    Eyes: Negative.    Respiratory:  Positive for cough and shortness of breath. Negative for hemoptysis, sputum production and wheezing.    Cardiovascular:  Positive for PND. Negative for chest pain and claudication.   Gastrointestinal:  Negative for abdominal pain and vomiting.   Endocrine: Negative.    Genitourinary: Negative.    Musculoskeletal:  Negative for neck pain.   Skin: Negative.    Neurological: Negative.  Negative for headaches.   Hematological: Negative.    All other systems reviewed and are negative.      Past Medical History:   Diagnosis Date    Atrial fibrillation     Bradycardia     Cancer 2002    Breast    Hyperlipidemia     Hypertension     Osteoporosis        Allergies   Allergen Reactions    Morphine Unknown - Low Severity     Has not taken. But family members cannot take.    Penicillins Rash       Past Surgical History:   Procedure Laterality Date    APPENDECTOMY      CARDIAC ELECTROPHYSIOLOGY PROCEDURE N/A 11/10/2021    Procedure: Device Implant single chamber mri;   Surgeon: Charles Nunez MD;  Location:  [facility];  Service: Cardiology;  Laterality: N/A;    ENDOSCOPY N/A 11/9/2021    Esophageal ring, medium size HH, Chronic active gastritis    HYSTERECTOMY      INSERT / REPLACE / REMOVE PACEMAKER      MASTECTOMY Bilateral        Family History   Problem Relation Age of Onset    Hypertension Father     Heart attack Brother     Cancer Brother     Colon cancer Neg Hx     Colon polyps Neg Hx        Social History     Socioeconomic History    Marital status:    Tobacco Use    Smoking status: Never     Passive exposure: Never    Smokeless tobacco: Never   Vaping Use    Vaping status: Never Used   Substance and Sexual Activity    Alcohol use: No    Drug use: No    Sexual activity: Defer           Objective   Physical Exam  Vitals and nursing note reviewed. Exam conducted with a chaperone present.   Constitutional:       General: She is not in acute distress.     Appearance: Normal appearance. She is well-developed. She is not toxic-appearing or diaphoretic.   HENT:      Head: Normocephalic and atraumatic.      Right Ear: External ear normal.      Nose: Nose normal.      Mouth/Throat:      Mouth: Mucous membranes are moist.   Eyes:      Conjunctiva/sclera: Conjunctivae normal.      Pupils: Pupils are equal, round, and reactive to light.   Cardiovascular:      Rate and Rhythm: Normal rate and regular rhythm.      Chest Wall: PMI is not displaced.      Pulses: Normal pulses. No decreased pulses.      Heart sounds: Normal heart sounds. No murmur heard.  Pulmonary:      Effort: Pulmonary effort is normal. No tachypnea, accessory muscle usage or respiratory distress.      Breath sounds: Normal breath sounds. No stridor. No decreased breath sounds, wheezing, rhonchi or rales.   Chest:      Chest wall: No tenderness or crepitus.   Abdominal:      General: Bowel sounds are normal. There is no distension.      Palpations: Abdomen is soft.      Tenderness: There is no  abdominal tenderness.   Musculoskeletal:         General: No swelling or tenderness. Normal range of motion.      Cervical back: Normal range of motion and neck supple. No rigidity.      Right lower leg: No tenderness. Edema present.      Left lower leg: No tenderness. Edema present.      Comments: Lower extremity exam bilaterally is unremarkable.  There is no right or left calf tenderness .  There is no palpable venous cord.  No obvious difference in the size of the legs.   The dorsalis pedis and posterior tibial femoral and popliteal pulses are palpable and +2 bilaterally.  Homans sign is negative   Skin:     General: Skin is warm.      Capillary Refill: Capillary refill takes less than 2 seconds.      Coloration: Skin is not jaundiced.      Findings: No erythema or rash.   Neurological:      General: No focal deficit present.      Mental Status: She is alert and oriented to person, place, and time. Mental status is at baseline.      Cranial Nerves: No cranial nerve deficit.      Motor: No weakness.      Coordination: Coordination normal.      Deep Tendon Reflexes: Reflexes are normal and symmetric. Reflexes normal.   Psychiatric:         Mood and Affect: Mood normal.         Procedures           ED Course  ED Course as of 07/14/25 1602 Mon Jul 14, 2025   1600 Patient came in with elevated blood pressure.  And some shortness of breath and CHF was given Lasix is feeling much better at this time was placed on supplemental oxygen troponin I is slightly elevated but delta tropes are negative.  Patient will be admitted for medicine service for further evaluation and treatment of her condition. [TS]   1602 Well score is 0 [TS]      ED Course User Index  [TS] Javier Oneill MD                                                       Medical Decision Making  Differential Diagnosis:  I considered pulmonary etiology, asthma, chronic obstructive pulmonary disease, pneumonia, pulmonary embolism, adult respiratory distress  syndrome, pneumothorax, pleural effusion, pulmonary fibrosis, cardiac etiology, congestive heart failure, myocardial infarction, metabolic etiology, diabetic ketoacidosis, uremia, acidosis, sepsis, anemia, drug related etiology, hyperventilation and CNS disease as a possible cause of dyspnea in this patient. This is a partial list of diagnoses considered.           Problems Addressed:  Congestive heart failure, unspecified HF chronicity, unspecified heart failure type: complicated acute illness or injury    Amount and/or Complexity of Data Reviewed  Labs: ordered.     Details: Labs were  Radiology: ordered.     Details: X-rays reviewed  ECG/medicine tests: ordered.     Details: Non-STEMI  Discussion of management or test interpretation with external provider(s): disussed with the hospitalist    Risk  Prescription drug management.  Decision regarding hospitalization.  Risk Details: To be admitted was given IV diuretics in the ER.        Final diagnoses:   Congestive heart failure, unspecified HF chronicity, unspecified heart failure type       ED Disposition  ED Disposition       ED Disposition   Decision to Admit    Condition   --    Comment   Level of Care: Telemetry [5]   Diagnosis: CHF (congestive heart failure) [978196]   Admitting Physician: JULIANN GOLD [[id number]]   Attending Physician: JULIANN GOLD [[id number]]   Certification: I Certify That Inpatient Hospital Services Are Medically Necessary For Greater Than 2 Midnights                 No follow-up provider specified.       Medication List      No changes were made to your prescriptions during this visit.            Javier Oneill MD  07/14/25 1224       Javier Oneill MD  07/14/25 1602       Javier Oneill MD  07/14/25 1602

## 2025-07-15 ENCOUNTER — APPOINTMENT (OUTPATIENT)
Dept: CARDIOLOGY | Facility: HOSPITAL | Age: OVER 89
End: 2025-07-15
Payer: MEDICARE

## 2025-07-15 PROBLEM — I50.33 ACUTE ON CHRONIC DIASTOLIC HEART FAILURE: Status: ACTIVE | Noted: 2025-07-15

## 2025-07-15 PROBLEM — I50.9 CHF (CONGESTIVE HEART FAILURE): Status: RESOLVED | Noted: 2025-07-14 | Resolved: 2025-07-15

## 2025-07-15 LAB
ALBUMIN SERPL-MCNC: 3.9 G/DL (ref 3.5–5.2)
ALBUMIN/GLOB SERPL: 1.5 G/DL
ALP SERPL-CCNC: 62 U/L (ref 39–117)
ALT SERPL W P-5'-P-CCNC: 11 U/L (ref 1–33)
ANION GAP SERPL CALCULATED.3IONS-SCNC: 12 MMOL/L (ref 5–15)
AORTIC DIMENSIONLESS INDEX: 0.6 (DI)
ASCENDING AORTA: 2.8 CM
AST SERPL-CCNC: 18 U/L (ref 1–32)
AV MEAN PRESS GRAD SYS DOP V1V2: 4.3 MMHG
AV VMAX SYS DOP: 139.1 CM/SEC
BASOPHILS # BLD AUTO: 0.04 10*3/MM3 (ref 0–0.2)
BASOPHILS NFR BLD AUTO: 0.9 % (ref 0–1.5)
BH CV ECHO MEAS - AI P1/2T: 589.3 MSEC
BH CV ECHO MEAS - AO MAX PG: 7.7 MMHG
BH CV ECHO MEAS - AO ROOT DIAM: 3.3 CM
BH CV ECHO MEAS - AO V2 VTI: 32.3 CM
BH CV ECHO MEAS - AVA(I,D): 1.8 CM2
BH CV ECHO MEAS - EDV(CUBED): 99.8 ML
BH CV ECHO MEAS - EDV(MOD-SP2): 66.2 ML
BH CV ECHO MEAS - EDV(MOD-SP4): 60.9 ML
BH CV ECHO MEAS - EF(MOD-SP2): 68.3 %
BH CV ECHO MEAS - EF(MOD-SP4): 67.9 %
BH CV ECHO MEAS - ESV(CUBED): 9 ML
BH CV ECHO MEAS - ESV(MOD-SP2): 21 ML
BH CV ECHO MEAS - ESV(MOD-SP4): 19.5 ML
BH CV ECHO MEAS - FS: 55.1 %
BH CV ECHO MEAS - IVS/LVPW: 0.57 CM
BH CV ECHO MEAS - IVSD: 0.7 CM
BH CV ECHO MEAS - LA DIMENSION: 4 CM
BH CV ECHO MEAS - LAT PEAK E' VEL: 8.7 CM/SEC
BH CV ECHO MEAS - LV DIASTOLIC VOL/BSA (35-75): 35.8 CM2
BH CV ECHO MEAS - LV MASS(C)D: 153.9 GRAMS
BH CV ECHO MEAS - LV MAX PG: 3.6 MMHG
BH CV ECHO MEAS - LV MEAN PG: 1.61 MMHG
BH CV ECHO MEAS - LV SYSTOLIC VOL/BSA (12-30): 11.5 CM2
BH CV ECHO MEAS - LV V1 MAX: 95.4 CM/SEC
BH CV ECHO MEAS - LV V1 VTI: 19.3 CM
BH CV ECHO MEAS - LVIDD: 4.6 CM
BH CV ECHO MEAS - LVIDS: 2.08 CM
BH CV ECHO MEAS - LVOT AREA: 3 CM2
BH CV ECHO MEAS - LVOT DIAM: 1.96 CM
BH CV ECHO MEAS - LVPWD: 1.23 CM
BH CV ECHO MEAS - MED PEAK E' VEL: 7.5 CM/SEC
BH CV ECHO MEAS - MR MAX PG: 155.9 MMHG
BH CV ECHO MEAS - MR MAX VEL: 624.2 CM/SEC
BH CV ECHO MEAS - MV A MAX VEL: 48.5 CM/SEC
BH CV ECHO MEAS - MV DEC SLOPE: 656.7 CM/SEC2
BH CV ECHO MEAS - MV DEC TIME: 0.21 SEC
BH CV ECHO MEAS - MV E MAX VEL: 138.1 CM/SEC
BH CV ECHO MEAS - MV E/A: 2.8
BH CV ECHO MEAS - PA V2 MAX: 97.1 CM/SEC
BH CV ECHO MEAS - PI END-D VEL: 114.6 CM/SEC
BH CV ECHO MEAS - RVDD: 2.6 CM
BH CV ECHO MEAS - SV(LVOT): 58 ML
BH CV ECHO MEAS - SV(MOD-SP2): 45.2 ML
BH CV ECHO MEAS - SV(MOD-SP4): 41.4 ML
BH CV ECHO MEAS - SVI(LVOT): 34.1 ML/M2
BH CV ECHO MEAS - SVI(MOD-SP2): 26.6 ML/M2
BH CV ECHO MEAS - SVI(MOD-SP4): 24.3 ML/M2
BH CV ECHO MEAS - TAPSE (>1.6): 1.49 CM
BH CV ECHO MEAS - TR MAX PG: 47.8 MMHG
BH CV ECHO MEAS - TR MAX VEL: 345.9 CM/SEC
BH CV ECHO MEASUREMENTS AVERAGE E/E' RATIO: 17.05
BH CV XLRA - RV BASE: 3.7 CM
BILIRUB SERPL-MCNC: 1.2 MG/DL (ref 0–1.2)
BUN SERPL-MCNC: 11.2 MG/DL (ref 8–23)
BUN/CREAT SERPL: 19.3 (ref 7–25)
CALCIUM SPEC-SCNC: 9.5 MG/DL (ref 8.2–9.6)
CHLORIDE SERPL-SCNC: 102 MMOL/L (ref 98–107)
CO2 SERPL-SCNC: 25 MMOL/L (ref 22–29)
CREAT SERPL-MCNC: 0.58 MG/DL (ref 0.57–1)
DEPRECATED RDW RBC AUTO: 48 FL (ref 37–54)
EGFRCR SERPLBLD CKD-EPI 2021: 84.5 ML/MIN/1.73
EOSINOPHIL # BLD AUTO: 0.14 10*3/MM3 (ref 0–0.4)
EOSINOPHIL NFR BLD AUTO: 3 % (ref 0.3–6.2)
ERYTHROCYTE [DISTWIDTH] IN BLOOD BY AUTOMATED COUNT: 14 % (ref 12.3–15.4)
GLOBULIN UR ELPH-MCNC: 2.6 GM/DL
GLUCOSE SERPL-MCNC: 93 MG/DL (ref 65–99)
HCT VFR BLD AUTO: 33.4 % (ref 34–46.6)
HGB BLD-MCNC: 10.7 G/DL (ref 12–15.9)
IMM GRANULOCYTES # BLD AUTO: 0.02 10*3/MM3 (ref 0–0.05)
IMM GRANULOCYTES NFR BLD AUTO: 0.4 % (ref 0–0.5)
INR PPP: 2.8 (ref 0.91–1.09)
LEFT ATRIUM VOLUME: 67 ML
LV EF BIPLANE MOD: 69 %
LYMPHOCYTES # BLD AUTO: 1.32 10*3/MM3 (ref 0.7–3.1)
LYMPHOCYTES NFR BLD AUTO: 28.4 % (ref 19.6–45.3)
MCH RBC QN AUTO: 30.4 PG (ref 26.6–33)
MCHC RBC AUTO-ENTMCNC: 32 G/DL (ref 31.5–35.7)
MCV RBC AUTO: 94.9 FL (ref 79–97)
MONOCYTES # BLD AUTO: 0.57 10*3/MM3 (ref 0.1–0.9)
MONOCYTES NFR BLD AUTO: 12.3 % (ref 5–12)
NEUTROPHILS NFR BLD AUTO: 2.56 10*3/MM3 (ref 1.7–7)
NEUTROPHILS NFR BLD AUTO: 55 % (ref 42.7–76)
NRBC BLD AUTO-RTO: 0 /100 WBC (ref 0–0.2)
PLATELET # BLD AUTO: 158 10*3/MM3 (ref 140–450)
PMV BLD AUTO: 10 FL (ref 6–12)
POTASSIUM SERPL-SCNC: 3.5 MMOL/L (ref 3.5–5.2)
POTASSIUM SERPL-SCNC: 4.4 MMOL/L (ref 3.5–5.2)
PROT SERPL-MCNC: 6.5 G/DL (ref 6–8.5)
PROTHROMBIN TIME: 31.2 SECONDS (ref 11.8–14.8)
QT INTERVAL: 480 MS
QT INTERVAL: 492 MS
QTC INTERVAL: 487 MS
QTC INTERVAL: 492 MS
RBC # BLD AUTO: 3.52 10*6/MM3 (ref 3.77–5.28)
SODIUM SERPL-SCNC: 139 MMOL/L (ref 136–145)
WBC NRBC COR # BLD AUTO: 4.65 10*3/MM3 (ref 3.4–10.8)

## 2025-07-15 PROCEDURE — 80053 COMPREHEN METABOLIC PANEL: CPT | Performed by: FAMILY MEDICINE

## 2025-07-15 PROCEDURE — 25010000002 FUROSEMIDE PER 20 MG: Performed by: FAMILY MEDICINE

## 2025-07-15 PROCEDURE — 85610 PROTHROMBIN TIME: CPT | Performed by: FAMILY MEDICINE

## 2025-07-15 PROCEDURE — 93306 TTE W/DOPPLER COMPLETE: CPT | Performed by: INTERNAL MEDICINE

## 2025-07-15 PROCEDURE — 93306 TTE W/DOPPLER COMPLETE: CPT

## 2025-07-15 PROCEDURE — 84132 ASSAY OF SERUM POTASSIUM: CPT | Performed by: FAMILY MEDICINE

## 2025-07-15 PROCEDURE — 85025 COMPLETE CBC W/AUTO DIFF WBC: CPT | Performed by: FAMILY MEDICINE

## 2025-07-15 RX ORDER — FUROSEMIDE 10 MG/ML
40 INJECTION INTRAMUSCULAR; INTRAVENOUS ONCE
Status: COMPLETED | OUTPATIENT
Start: 2025-07-15 | End: 2025-07-15

## 2025-07-15 RX ORDER — POTASSIUM CHLORIDE 1500 MG/1
40 TABLET, EXTENDED RELEASE ORAL EVERY 4 HOURS
Status: COMPLETED | OUTPATIENT
Start: 2025-07-15 | End: 2025-07-15

## 2025-07-15 RX ADMIN — ACETAMINOPHEN 650 MG: 325 TABLET ORAL at 20:19

## 2025-07-15 RX ADMIN — PANTOPRAZOLE SODIUM 40 MG: 40 TABLET, DELAYED RELEASE ORAL at 05:57

## 2025-07-15 RX ADMIN — POTASSIUM CHLORIDE 40 MEQ: 1500 TABLET, EXTENDED RELEASE ORAL at 09:46

## 2025-07-15 RX ADMIN — ATORVASTATIN CALCIUM 20 MG: 10 TABLET ORAL at 20:20

## 2025-07-15 RX ADMIN — ESCITALOPRAM 20 MG: 10 TABLET, FILM COATED ORAL at 09:44

## 2025-07-15 RX ADMIN — WARFARIN SODIUM 7.5 MG: 7.5 TABLET ORAL at 18:30

## 2025-07-15 RX ADMIN — POTASSIUM CHLORIDE 40 MEQ: 1500 TABLET, EXTENDED RELEASE ORAL at 06:00

## 2025-07-15 RX ADMIN — FUROSEMIDE 40 MG: 10 INJECTION, SOLUTION INTRAMUSCULAR; INTRAVENOUS at 09:44

## 2025-07-15 RX ADMIN — Medication 10 ML: at 09:48

## 2025-07-15 RX ADMIN — LISINOPRIL 10 MG: 10 TABLET ORAL at 20:19

## 2025-07-15 NOTE — PLAN OF CARE
Goal Outcome Evaluation:   Pt. A&Ox4. VSS. 2L NC. No c/o pain. Vpaced on tele 60-64, pvcs. Safety maintained

## 2025-07-16 ENCOUNTER — READMISSION MANAGEMENT (OUTPATIENT)
Dept: CALL CENTER | Facility: HOSPITAL | Age: OVER 89
End: 2025-07-16
Payer: MEDICARE

## 2025-07-16 VITALS
DIASTOLIC BLOOD PRESSURE: 60 MMHG | WEIGHT: 140.2 LBS | RESPIRATION RATE: 17 BRPM | TEMPERATURE: 97.6 F | OXYGEN SATURATION: 96 % | HEART RATE: 86 BPM | SYSTOLIC BLOOD PRESSURE: 150 MMHG | HEIGHT: 65 IN | BODY MASS INDEX: 23.36 KG/M2

## 2025-07-16 PROBLEM — I50.33 ACUTE ON CHRONIC DIASTOLIC HEART FAILURE: Status: RESOLVED | Noted: 2025-07-15 | Resolved: 2025-07-16

## 2025-07-16 LAB
ALBUMIN SERPL-MCNC: 4 G/DL (ref 3.5–5.2)
ALBUMIN/GLOB SERPL: 1.6 G/DL
ALP SERPL-CCNC: 66 U/L (ref 39–117)
ALT SERPL W P-5'-P-CCNC: 9 U/L (ref 1–33)
ANION GAP SERPL CALCULATED.3IONS-SCNC: 10 MMOL/L (ref 5–15)
AST SERPL-CCNC: 17 U/L (ref 1–32)
BASOPHILS # BLD AUTO: 0.05 10*3/MM3 (ref 0–0.2)
BASOPHILS NFR BLD AUTO: 0.9 % (ref 0–1.5)
BILIRUB SERPL-MCNC: 1.2 MG/DL (ref 0–1.2)
BUN SERPL-MCNC: 15.6 MG/DL (ref 8–23)
BUN/CREAT SERPL: 27.9 (ref 7–25)
CALCIUM SPEC-SCNC: 10 MG/DL (ref 8.2–9.6)
CHLORIDE SERPL-SCNC: 100 MMOL/L (ref 98–107)
CO2 SERPL-SCNC: 26 MMOL/L (ref 22–29)
CREAT SERPL-MCNC: 0.56 MG/DL (ref 0.57–1)
DEPRECATED RDW RBC AUTO: 46.5 FL (ref 37–54)
EGFRCR SERPLBLD CKD-EPI 2021: 85.2 ML/MIN/1.73
EOSINOPHIL # BLD AUTO: 0.19 10*3/MM3 (ref 0–0.4)
EOSINOPHIL NFR BLD AUTO: 3.5 % (ref 0.3–6.2)
ERYTHROCYTE [DISTWIDTH] IN BLOOD BY AUTOMATED COUNT: 13.8 % (ref 12.3–15.4)
GLOBULIN UR ELPH-MCNC: 2.5 GM/DL
GLUCOSE SERPL-MCNC: 106 MG/DL (ref 65–99)
HCT VFR BLD AUTO: 36.3 % (ref 34–46.6)
HGB BLD-MCNC: 11.5 G/DL (ref 12–15.9)
IMM GRANULOCYTES # BLD AUTO: 0.02 10*3/MM3 (ref 0–0.05)
IMM GRANULOCYTES NFR BLD AUTO: 0.4 % (ref 0–0.5)
INR PPP: 2.37 (ref 0.91–1.09)
LYMPHOCYTES # BLD AUTO: 1.37 10*3/MM3 (ref 0.7–3.1)
LYMPHOCYTES NFR BLD AUTO: 24.9 % (ref 19.6–45.3)
MCH RBC QN AUTO: 29.6 PG (ref 26.6–33)
MCHC RBC AUTO-ENTMCNC: 31.7 G/DL (ref 31.5–35.7)
MCV RBC AUTO: 93.3 FL (ref 79–97)
MONOCYTES # BLD AUTO: 0.81 10*3/MM3 (ref 0.1–0.9)
MONOCYTES NFR BLD AUTO: 14.7 % (ref 5–12)
NEUTROPHILS NFR BLD AUTO: 3.06 10*3/MM3 (ref 1.7–7)
NEUTROPHILS NFR BLD AUTO: 55.6 % (ref 42.7–76)
NRBC BLD AUTO-RTO: 0 /100 WBC (ref 0–0.2)
PLATELET # BLD AUTO: 191 10*3/MM3 (ref 140–450)
PMV BLD AUTO: 10.1 FL (ref 6–12)
POTASSIUM SERPL-SCNC: 4.7 MMOL/L (ref 3.5–5.2)
PROT SERPL-MCNC: 6.5 G/DL (ref 6–8.5)
PROTHROMBIN TIME: 27.4 SECONDS (ref 11.8–14.8)
RBC # BLD AUTO: 3.89 10*6/MM3 (ref 3.77–5.28)
SODIUM SERPL-SCNC: 136 MMOL/L (ref 136–145)
WBC NRBC COR # BLD AUTO: 5.5 10*3/MM3 (ref 3.4–10.8)

## 2025-07-16 PROCEDURE — 80053 COMPREHEN METABOLIC PANEL: CPT | Performed by: FAMILY MEDICINE

## 2025-07-16 PROCEDURE — 85025 COMPLETE CBC W/AUTO DIFF WBC: CPT | Performed by: FAMILY MEDICINE

## 2025-07-16 PROCEDURE — 94618 PULMONARY STRESS TESTING: CPT

## 2025-07-16 PROCEDURE — 85610 PROTHROMBIN TIME: CPT | Performed by: FAMILY MEDICINE

## 2025-07-16 RX ORDER — FUROSEMIDE 20 MG/1
20 TABLET ORAL DAILY PRN
Qty: 90 TABLET | Refills: 0 | Status: SHIPPED | OUTPATIENT
Start: 2025-07-16

## 2025-07-16 RX ORDER — LISINOPRIL 10 MG/1
10 TABLET ORAL NIGHTLY
Qty: 90 TABLET | Refills: 3 | Status: SHIPPED | OUTPATIENT
Start: 2025-07-16

## 2025-07-16 RX ADMIN — ESCITALOPRAM 20 MG: 10 TABLET, FILM COATED ORAL at 08:30

## 2025-07-16 RX ADMIN — PANTOPRAZOLE SODIUM 40 MG: 40 TABLET, DELAYED RELEASE ORAL at 06:06

## 2025-07-16 NOTE — OUTREACH NOTE
Prep Survey      Flowsheet Row Responses   [facility] facility patient discharged from? [place]   Is LACE score < 7 ? No   Eligibility Readm Mgmt   Discharge diagnosis Acute on chronic diastolic CHF (   Does the patient have one of the following disease processes/diagnoses(primary or secondary)? CHF   Does the patient have Home health ordered? No   Is there a DME ordered? No   Prep survey completed? Yes            JOSUE LIZARRAGA - Registered Nurse

## 2025-07-16 NOTE — PROCEDURES
3Exercise Oximetry    Patient Name:Yudi Westbrook   MRN: [id number]   Date: 07/16/25             ROOM AIR BASELINE   SpO2% 93   Heart Rate 84   Blood Pressure      EXERCISE ON ROOM AIR SpO2% EXERCISE ON O2 @  LPM SpO2%   1 MINUTE 92 1 MINUTE    2 MINUTES 90 2 MINUTES    3 MINUTES 90 3 MINUTES    4 MINUTES 90 4 MINUTES    5 MINUTES  5 MINUTES    6 MINUTES  6 MINUTES               Distance Walked  100 feet Distance Walked   Dyspnea (Wyatt Scale)   Dyspnea (Wyatt Scale)   Fatigue (Wyatt Scale)   Fatigue (Wyatt Scale)   SpO2% Post Exercise  88 SpO2% Post Exercise   HR Post Exercise  92 HR Post Exercise   Time to Recovery  less than one minute Time to Recovery     Comments: Pt was on room air when I came into room.  Pt walked around room on room air.  The whole time we were walking, her sat did not drop below 90%.  GIOVANNI Burnham notified.

## 2025-07-16 NOTE — DISCHARGE SUMMARY
Hospital Discharge Summary    Yudi Westbrook  :  1932  MRN:  [id number]    Admit date:  2025  Discharge date: 2025    Admitting Physician:    Elia Preston MD    Discharge Diagnoses:      Acute on chronic diastolic heart failure    Atrial fibrillation    Hypertension    Hyperlipidemia    Pulmonary hypertension      Hospital Course:   Yudi Westbrook is a 89-year-old female with underlying significant pulmonary hypertension who presented with worsening shortness of breath lower extremity edema.  Repeat echo showed worsening pulmonary hypertension as diffuse valvular disease.  Given her advanced age and no desire for intervention, she was treated medically with diuresis and improved significantly.  She was discharged home in stable condition with the addition of Lasix as needed for swelling or shortness of breath.  I will get her a close follow-up with both myself and cardiology to coordinate care.  She may benefit from establishment with the heart failure clinic.    The patient was admitted for the above noted medical/surgical issues. Please see daily progress note for further details concerning their stay. The patient improved throughout their stay and reached maximum medical improvement on the day of discharge. The patient/family agree with the treatment plan as outlined above. All questions concerning their stay were answered prior to discharge. They understand the importance of follow up concerning any abnormal test results.     Physical Exam      Discharge Medications:         Discharge Medications        New Medications        Instructions Start Date   furosemide 20 MG tablet  Commonly known as: Lasix   20 mg, Oral, Daily PRN             Continue These Medications        Instructions Start Date   benzonatate 100 MG capsule  Commonly known as: TESSALON   100 mg, Oral, 3 Times Daily PRN      Calcium Carb-Cholecalciferol 600-200 MG-UNIT tablet   1 tablet, Daily      escitalopram 20 MG  tablet  Commonly known as: LEXAPRO   1 tablet, Oral, Daily      fluticasone 50 MCG/ACT nasal spray  Commonly known as: FLONASE   2 sprays, Daily PRN      lisinopril 10 MG tablet  Commonly known as: PRINIVIL,ZESTRIL   10 mg, Oral, Nightly      pantoprazole 40 MG EC tablet  Commonly known as: PROTONIX   40 mg, Oral, Every Morning      simvastatin 20 MG tablet  Commonly known as: ZOCOR   20 mg, Oral, Nightly      vitamin B-12 1000 MCG tablet  Commonly known as: CYANOCOBALAMIN   1 tablet, Daily      Vitamin E 100 units tablet   1 tablet, Daily      warfarin 7.5 MG tablet  Commonly known as: COUMADIN   7.5 mg, Oral, 6 Times Weekly, Everyday except Saturday      warfarin 2.5 MG tablet  Commonly known as: COUMADIN   2.5 mg, Oral, Every 7 Days, Saturday                Activity: As before    Diet: Low-sodium    Consults: None    Significant Diagnostic Studies:    XR Chest 1 View  Result Date: 7/14/2025   1. Interstitial prominence likely mostly chronic change however mild edema is not excluded. 2. Small bilateral pleural effusions. 3. Large hiatal hernia.  This report was signed and finalized on 7/14/2025 12:30 PM by Rakesh Steward.             Treatments:   IV Lasix    Disposition:   Home in stable condition    22 minutes time spent on discharge including discussion with patient/family, SW, and coordination of care.     Follow up with Elia Preston MD in 1 weeks.    Signed:  Elia Preston MD   7/16/2025, 07:48 CDT

## 2025-07-16 NOTE — PLAN OF CARE
Goal Outcome Evaluation:      Pt. A&Ox4. VSS. 3L NC. Some c/o muscles aches, tylenol given.  60-64. Safety maintained

## 2025-07-21 ENCOUNTER — APPOINTMENT (OUTPATIENT)
Dept: GENERAL RADIOLOGY | Facility: HOSPITAL | Age: OVER 89
End: 2025-07-21
Payer: MEDICARE

## 2025-07-21 ENCOUNTER — APPOINTMENT (OUTPATIENT)
Dept: CT IMAGING | Facility: HOSPITAL | Age: OVER 89
End: 2025-07-21
Payer: MEDICARE

## 2025-07-21 ENCOUNTER — HOSPITAL ENCOUNTER (INPATIENT)
Facility: HOSPITAL | Age: OVER 89
LOS: 2 days | Discharge: NURSING, REHAB OR LONG-TERM CARE | End: 2025-07-23
Attending: FAMILY MEDICINE | Admitting: FAMILY MEDICINE
Payer: MEDICARE

## 2025-07-21 DIAGNOSIS — Z74.09 IMPAIRED MOBILITY: Primary | ICD-10-CM

## 2025-07-21 PROBLEM — S32.029A L2 VERTEBRAL FRACTURE: Status: ACTIVE | Noted: 2025-07-21

## 2025-07-21 LAB
ALBUMIN SERPL-MCNC: 4.4 G/DL (ref 3.5–5.2)
ALBUMIN/GLOB SERPL: 1.6 G/DL
ALP SERPL-CCNC: 71 U/L (ref 39–117)
ALT SERPL W P-5'-P-CCNC: 20 U/L (ref 1–33)
ANION GAP SERPL CALCULATED.3IONS-SCNC: 13 MMOL/L (ref 5–15)
APTT PPP: 36.8 SECONDS (ref 24.5–36)
AST SERPL-CCNC: 25 U/L (ref 1–32)
BASOPHILS # BLD AUTO: 0.04 10*3/MM3 (ref 0–0.2)
BASOPHILS NFR BLD AUTO: 0.4 % (ref 0–1.5)
BILIRUB SERPL-MCNC: 0.7 MG/DL (ref 0–1.2)
BUN SERPL-MCNC: 17 MG/DL (ref 8–23)
BUN/CREAT SERPL: 27 (ref 7–25)
CALCIUM SPEC-SCNC: 10 MG/DL (ref 8.2–9.6)
CHLORIDE SERPL-SCNC: 101 MMOL/L (ref 98–107)
CO2 SERPL-SCNC: 23 MMOL/L (ref 22–29)
CREAT SERPL-MCNC: 0.63 MG/DL (ref 0.57–1)
DEPRECATED RDW RBC AUTO: 47.7 FL (ref 37–54)
EGFRCR SERPLBLD CKD-EPI 2021: 82.8 ML/MIN/1.73
EOSINOPHIL # BLD AUTO: 0.06 10*3/MM3 (ref 0–0.4)
EOSINOPHIL NFR BLD AUTO: 0.6 % (ref 0.3–6.2)
ERYTHROCYTE [DISTWIDTH] IN BLOOD BY AUTOMATED COUNT: 13.6 % (ref 12.3–15.4)
GLOBULIN UR ELPH-MCNC: 2.8 GM/DL
GLUCOSE SERPL-MCNC: 127 MG/DL (ref 65–99)
HCT VFR BLD AUTO: 37.5 % (ref 34–46.6)
HGB BLD-MCNC: 11.8 G/DL (ref 12–15.9)
IMM GRANULOCYTES # BLD AUTO: 0.09 10*3/MM3 (ref 0–0.05)
IMM GRANULOCYTES NFR BLD AUTO: 1 % (ref 0–0.5)
INR PPP: 2.26 (ref 0.91–1.09)
LYMPHOCYTES # BLD AUTO: 1.01 10*3/MM3 (ref 0.7–3.1)
LYMPHOCYTES NFR BLD AUTO: 10.9 % (ref 19.6–45.3)
MCH RBC QN AUTO: 29.9 PG (ref 26.6–33)
MCHC RBC AUTO-ENTMCNC: 31.5 G/DL (ref 31.5–35.7)
MCV RBC AUTO: 94.9 FL (ref 79–97)
MONOCYTES # BLD AUTO: 0.6 10*3/MM3 (ref 0.1–0.9)
MONOCYTES NFR BLD AUTO: 6.5 % (ref 5–12)
NEUTROPHILS NFR BLD AUTO: 7.48 10*3/MM3 (ref 1.7–7)
NEUTROPHILS NFR BLD AUTO: 80.6 % (ref 42.7–76)
NRBC BLD AUTO-RTO: 0 /100 WBC (ref 0–0.2)
PLATELET # BLD AUTO: 215 10*3/MM3 (ref 140–450)
PMV BLD AUTO: 9.7 FL (ref 6–12)
POTASSIUM SERPL-SCNC: 4.2 MMOL/L (ref 3.5–5.2)
PROT SERPL-MCNC: 7.2 G/DL (ref 6–8.5)
PROTHROMBIN TIME: 26.3 SECONDS (ref 11.8–14.8)
RBC # BLD AUTO: 3.95 10*6/MM3 (ref 3.77–5.28)
SODIUM SERPL-SCNC: 137 MMOL/L (ref 136–145)
WBC NRBC COR # BLD AUTO: 9.28 10*3/MM3 (ref 3.4–10.8)

## 2025-07-21 PROCEDURE — 70450 CT HEAD/BRAIN W/O DYE: CPT

## 2025-07-21 PROCEDURE — 85610 PROTHROMBIN TIME: CPT | Performed by: NURSE PRACTITIONER

## 2025-07-21 PROCEDURE — 85730 THROMBOPLASTIN TIME PARTIAL: CPT | Performed by: NURSE PRACTITIONER

## 2025-07-21 PROCEDURE — 73523 X-RAY EXAM HIPS BI 5/> VIEWS: CPT

## 2025-07-21 PROCEDURE — 25010000002 HYDROMORPHONE PER 4 MG: Performed by: NURSE PRACTITIONER

## 2025-07-21 PROCEDURE — 25010000002 ONDANSETRON PER 1 MG: Performed by: NURSE PRACTITIONER

## 2025-07-21 PROCEDURE — 72128 CT CHEST SPINE W/O DYE: CPT

## 2025-07-21 PROCEDURE — 72131 CT LUMBAR SPINE W/O DYE: CPT

## 2025-07-21 PROCEDURE — 73030 X-RAY EXAM OF SHOULDER: CPT

## 2025-07-21 PROCEDURE — 85025 COMPLETE CBC W/AUTO DIFF WBC: CPT | Performed by: NURSE PRACTITIONER

## 2025-07-21 PROCEDURE — 72125 CT NECK SPINE W/O DYE: CPT

## 2025-07-21 PROCEDURE — 80053 COMPREHEN METABOLIC PANEL: CPT | Performed by: NURSE PRACTITIONER

## 2025-07-21 PROCEDURE — 73080 X-RAY EXAM OF ELBOW: CPT

## 2025-07-21 PROCEDURE — 99285 EMERGENCY DEPT VISIT HI MDM: CPT

## 2025-07-21 RX ORDER — ONDANSETRON 2 MG/ML
4 INJECTION INTRAMUSCULAR; INTRAVENOUS ONCE
Status: COMPLETED | OUTPATIENT
Start: 2025-07-21 | End: 2025-07-21

## 2025-07-21 RX ORDER — HYDROMORPHONE HYDROCHLORIDE 1 MG/ML
0.25 INJECTION, SOLUTION INTRAMUSCULAR; INTRAVENOUS; SUBCUTANEOUS ONCE
Refills: 0 | Status: COMPLETED | OUTPATIENT
Start: 2025-07-21 | End: 2025-07-21

## 2025-07-21 RX ORDER — SODIUM CHLORIDE 0.9 % (FLUSH) 0.9 %
10 SYRINGE (ML) INJECTION AS NEEDED
Status: DISCONTINUED | OUTPATIENT
Start: 2025-07-21 | End: 2025-07-23 | Stop reason: HOSPADM

## 2025-07-21 RX ORDER — ONDANSETRON 4 MG/1
4 TABLET, ORALLY DISINTEGRATING ORAL EVERY 6 HOURS PRN
Status: DISCONTINUED | OUTPATIENT
Start: 2025-07-21 | End: 2025-07-23 | Stop reason: HOSPADM

## 2025-07-21 RX ORDER — ACETAMINOPHEN 325 MG/1
650 TABLET ORAL EVERY 4 HOURS PRN
Status: DISCONTINUED | OUTPATIENT
Start: 2025-07-21 | End: 2025-07-23 | Stop reason: HOSPADM

## 2025-07-21 RX ORDER — BISACODYL 10 MG
10 SUPPOSITORY, RECTAL RECTAL DAILY PRN
Status: DISCONTINUED | OUTPATIENT
Start: 2025-07-21 | End: 2025-07-23 | Stop reason: HOSPADM

## 2025-07-21 RX ORDER — ACETAMINOPHEN 160 MG/5ML
650 SOLUTION ORAL EVERY 4 HOURS PRN
Status: DISCONTINUED | OUTPATIENT
Start: 2025-07-21 | End: 2025-07-23 | Stop reason: HOSPADM

## 2025-07-21 RX ORDER — ACETAMINOPHEN 650 MG/1
650 SUPPOSITORY RECTAL EVERY 4 HOURS PRN
Status: DISCONTINUED | OUTPATIENT
Start: 2025-07-21 | End: 2025-07-23 | Stop reason: HOSPADM

## 2025-07-21 RX ORDER — POLYETHYLENE GLYCOL 3350 17 G/17G
17 POWDER, FOR SOLUTION ORAL DAILY PRN
Status: DISCONTINUED | OUTPATIENT
Start: 2025-07-21 | End: 2025-07-23 | Stop reason: HOSPADM

## 2025-07-21 RX ORDER — HYDROCODONE BITARTRATE AND ACETAMINOPHEN 5; 325 MG/1; MG/1
1 TABLET ORAL EVERY 6 HOURS PRN
Status: DISCONTINUED | OUTPATIENT
Start: 2025-07-21 | End: 2025-07-23 | Stop reason: HOSPADM

## 2025-07-21 RX ORDER — BISACODYL 5 MG/1
5 TABLET, DELAYED RELEASE ORAL DAILY PRN
Status: DISCONTINUED | OUTPATIENT
Start: 2025-07-21 | End: 2025-07-23 | Stop reason: HOSPADM

## 2025-07-21 RX ORDER — ONDANSETRON 2 MG/ML
4 INJECTION INTRAMUSCULAR; INTRAVENOUS EVERY 6 HOURS PRN
Status: DISCONTINUED | OUTPATIENT
Start: 2025-07-21 | End: 2025-07-23 | Stop reason: HOSPADM

## 2025-07-21 RX ORDER — AMOXICILLIN 250 MG
2 CAPSULE ORAL 2 TIMES DAILY PRN
Status: DISCONTINUED | OUTPATIENT
Start: 2025-07-21 | End: 2025-07-23 | Stop reason: HOSPADM

## 2025-07-21 RX ADMIN — ONDANSETRON 4 MG: 2 INJECTION, SOLUTION INTRAMUSCULAR; INTRAVENOUS at 19:55

## 2025-07-21 RX ADMIN — HYDROMORPHONE HYDROCHLORIDE 0.25 MG: 1 INJECTION, SOLUTION INTRAMUSCULAR; INTRAVENOUS; SUBCUTANEOUS at 19:55

## 2025-07-22 ENCOUNTER — READMISSION MANAGEMENT (OUTPATIENT)
Dept: CALL CENTER | Facility: HOSPITAL | Age: OVER 89
End: 2025-07-22
Payer: MEDICARE

## 2025-07-22 PROBLEM — I07.1 SEVERE TRICUSPID REGURGITATION: Status: ACTIVE | Noted: 2025-07-22

## 2025-07-22 PROBLEM — I50.32 CHRONIC DIASTOLIC CHF (CONGESTIVE HEART FAILURE): Status: ACTIVE | Noted: 2025-07-15

## 2025-07-22 LAB
ALBUMIN SERPL-MCNC: 4 G/DL (ref 3.5–5.2)
ALBUMIN/GLOB SERPL: 1.6 G/DL
ALP SERPL-CCNC: 64 U/L (ref 39–117)
ALT SERPL W P-5'-P-CCNC: 17 U/L (ref 1–33)
ANION GAP SERPL CALCULATED.3IONS-SCNC: 10 MMOL/L (ref 5–15)
AST SERPL-CCNC: 21 U/L (ref 1–32)
BASOPHILS # BLD AUTO: 0.06 10*3/MM3 (ref 0–0.2)
BASOPHILS NFR BLD AUTO: 0.9 % (ref 0–1.5)
BILIRUB SERPL-MCNC: 1 MG/DL (ref 0–1.2)
BUN SERPL-MCNC: 16 MG/DL (ref 8–23)
BUN/CREAT SERPL: 29.1 (ref 7–25)
CALCIUM SPEC-SCNC: 9.7 MG/DL (ref 8.2–9.6)
CHLORIDE SERPL-SCNC: 101 MMOL/L (ref 98–107)
CO2 SERPL-SCNC: 24 MMOL/L (ref 22–29)
CREAT SERPL-MCNC: 0.55 MG/DL (ref 0.57–1)
DEPRECATED RDW RBC AUTO: 49.6 FL (ref 37–54)
EGFRCR SERPLBLD CKD-EPI 2021: 85.6 ML/MIN/1.73
EOSINOPHIL # BLD AUTO: 0.13 10*3/MM3 (ref 0–0.4)
EOSINOPHIL NFR BLD AUTO: 1.9 % (ref 0.3–6.2)
ERYTHROCYTE [DISTWIDTH] IN BLOOD BY AUTOMATED COUNT: 13.7 % (ref 12.3–15.4)
GLOBULIN UR ELPH-MCNC: 2.5 GM/DL
GLUCOSE SERPL-MCNC: 112 MG/DL (ref 65–99)
HCT VFR BLD AUTO: 37.6 % (ref 34–46.6)
HGB BLD-MCNC: 11.7 G/DL (ref 12–15.9)
IMM GRANULOCYTES # BLD AUTO: 0.04 10*3/MM3 (ref 0–0.05)
IMM GRANULOCYTES NFR BLD AUTO: 0.6 % (ref 0–0.5)
LYMPHOCYTES # BLD AUTO: 0.9 10*3/MM3 (ref 0.7–3.1)
LYMPHOCYTES NFR BLD AUTO: 13 % (ref 19.6–45.3)
MCH RBC QN AUTO: 30.5 PG (ref 26.6–33)
MCHC RBC AUTO-ENTMCNC: 31.1 G/DL (ref 31.5–35.7)
MCV RBC AUTO: 97.9 FL (ref 79–97)
MONOCYTES # BLD AUTO: 0.54 10*3/MM3 (ref 0.1–0.9)
MONOCYTES NFR BLD AUTO: 7.8 % (ref 5–12)
NEUTROPHILS NFR BLD AUTO: 5.27 10*3/MM3 (ref 1.7–7)
NEUTROPHILS NFR BLD AUTO: 75.8 % (ref 42.7–76)
NRBC BLD AUTO-RTO: 0 /100 WBC (ref 0–0.2)
PLATELET # BLD AUTO: 183 10*3/MM3 (ref 140–450)
PMV BLD AUTO: 10.2 FL (ref 6–12)
POTASSIUM SERPL-SCNC: 4.4 MMOL/L (ref 3.5–5.2)
PROT SERPL-MCNC: 6.5 G/DL (ref 6–8.5)
RBC # BLD AUTO: 3.84 10*6/MM3 (ref 3.77–5.28)
SODIUM SERPL-SCNC: 135 MMOL/L (ref 136–145)
WBC NRBC COR # BLD AUTO: 6.94 10*3/MM3 (ref 3.4–10.8)

## 2025-07-22 PROCEDURE — 80053 COMPREHEN METABOLIC PANEL: CPT | Performed by: FAMILY MEDICINE

## 2025-07-22 PROCEDURE — 85025 COMPLETE CBC W/AUTO DIFF WBC: CPT | Performed by: FAMILY MEDICINE

## 2025-07-22 PROCEDURE — 97162 PT EVAL MOD COMPLEX 30 MIN: CPT

## 2025-07-22 PROCEDURE — 99221 1ST HOSP IP/OBS SF/LOW 40: CPT | Performed by: PHYSICIAN ASSISTANT

## 2025-07-22 PROCEDURE — 36415 COLL VENOUS BLD VENIPUNCTURE: CPT | Performed by: FAMILY MEDICINE

## 2025-07-22 RX ORDER — PANTOPRAZOLE SODIUM 40 MG/1
40 TABLET, DELAYED RELEASE ORAL EVERY MORNING
Status: DISCONTINUED | OUTPATIENT
Start: 2025-07-22 | End: 2025-07-23 | Stop reason: HOSPADM

## 2025-07-22 RX ORDER — BENZONATATE 100 MG/1
100 CAPSULE ORAL 3 TIMES DAILY PRN
Status: DISCONTINUED | OUTPATIENT
Start: 2025-07-22 | End: 2025-07-23 | Stop reason: HOSPADM

## 2025-07-22 RX ORDER — ESCITALOPRAM OXALATE 10 MG/1
20 TABLET ORAL DAILY
Status: DISCONTINUED | OUTPATIENT
Start: 2025-07-22 | End: 2025-07-23 | Stop reason: HOSPADM

## 2025-07-22 RX ORDER — FUROSEMIDE 20 MG/1
20 TABLET ORAL DAILY PRN
Status: DISCONTINUED | OUTPATIENT
Start: 2025-07-22 | End: 2025-07-23 | Stop reason: HOSPADM

## 2025-07-22 RX ORDER — ATORVASTATIN CALCIUM 10 MG/1
20 TABLET, FILM COATED ORAL DAILY
Status: DISCONTINUED | OUTPATIENT
Start: 2025-07-22 | End: 2025-07-23 | Stop reason: HOSPADM

## 2025-07-22 RX ORDER — LISINOPRIL 10 MG/1
10 TABLET ORAL NIGHTLY
Status: DISCONTINUED | OUTPATIENT
Start: 2025-07-22 | End: 2025-07-23 | Stop reason: HOSPADM

## 2025-07-22 RX ADMIN — LISINOPRIL 10 MG: 10 TABLET ORAL at 10:15

## 2025-07-22 RX ADMIN — HYDROCODONE BITARTRATE AND ACETAMINOPHEN 1 TABLET: 5; 325 TABLET ORAL at 03:18

## 2025-07-22 RX ADMIN — ACETAMINOPHEN 650 MG: 325 TABLET ORAL at 19:48

## 2025-07-22 RX ADMIN — ESCITALOPRAM 20 MG: 10 TABLET, FILM COATED ORAL at 10:16

## 2025-07-22 RX ADMIN — ATORVASTATIN CALCIUM 20 MG: 10 TABLET ORAL at 10:15

## 2025-07-22 RX ADMIN — PANTOPRAZOLE SODIUM 40 MG: 40 TABLET, DELAYED RELEASE ORAL at 10:16

## 2025-07-22 RX ADMIN — ACETAMINOPHEN 650 MG: 325 TABLET ORAL at 14:19

## 2025-07-22 NOTE — ED PROVIDER NOTES
Subjective   History of Present Illness  Patient is a 89-year-old female who presents to the ER due to a fall.  Patient states that she was cooking dinner tonight when she describes a mechanical fall.  She states that she had picked up potatoes that she had cooked on the stove and was transferring them to the sink when she got twisted up on her footing causing her to fall.  She mainly complains of low back pain due to the fall.  Denies any loss of bowel or bladder control or saddle anesthesia.  She reports previous history of a spinal fracture.  She admits to hitting her head without loss of consciousness.  She is on Coumadin for history of atrial fibrillation.  She complains of left elbow and right shoulder pain as well due to the fall.  She is neurologically intact.  Due to symptoms described came to the emergency department for evaluation and treatment.  Past medical history significant for atrial fibrillation, bradycardia, breast cancer, hyperlipidemia, hypertension, osteoporosis        Review of Systems   Constitutional: Negative.    HENT: Negative.     Respiratory: Negative.     Cardiovascular: Negative.    Gastrointestinal: Negative.    Genitourinary: Negative.    Musculoskeletal:  Positive for back pain and neck pain.        Positive for right shoulder and left elbow pain   Skin:  Positive for wound.        Positive for bruising and skin tear to the left elbow   All other systems reviewed and are negative.      Past Medical History:   Diagnosis Date    Atrial fibrillation     Bradycardia     Cancer 2002    Breast    Hyperlipidemia     Hypertension     Osteoporosis        Allergies   Allergen Reactions    Morphine Unknown - Low Severity     Has not taken. But family members cannot take.    Penicillins Rash       Past Surgical History:   Procedure Laterality Date    APPENDECTOMY      CARDIAC ELECTROPHYSIOLOGY PROCEDURE N/A 11/10/2021    Procedure: Device Implant single chamber mri;  Surgeon: Charles Nunez MD;   Location:  [facility];  Service: Cardiology;  Laterality: N/A;    ENDOSCOPY N/A 11/9/2021    Esophageal ring, medium size HH, Chronic active gastritis    HYSTERECTOMY      INSERT / REPLACE / REMOVE PACEMAKER      MASTECTOMY Bilateral        Family History   Problem Relation Age of Onset    Hypertension Father     Heart attack Brother     Cancer Brother     Colon cancer Neg Hx     Colon polyps Neg Hx        Social History     Socioeconomic History    Marital status:    Tobacco Use    Smoking status: Never     Passive exposure: Never    Smokeless tobacco: Never   Vaping Use    Vaping status: Never Used   Substance and Sexual Activity    Alcohol use: No    Drug use: No    Sexual activity: Defer           Objective   Physical Exam  Vitals and nursing note reviewed.   Constitutional:       Appearance: She is well-developed.   HENT:      Head: Normocephalic and atraumatic.      Nose: Nose normal.   Eyes:      Conjunctiva/sclera: Conjunctivae normal.      Pupils: Pupils are equal, round, and reactive to light.   Cardiovascular:      Rate and Rhythm: Normal rate and regular rhythm.      Heart sounds: Normal heart sounds.   Pulmonary:      Effort: Pulmonary effort is normal.      Breath sounds: Normal breath sounds.   Abdominal:      General: Bowel sounds are normal.      Palpations: Abdomen is soft.      Tenderness: There is no abdominal tenderness.   Musculoskeletal:      Cervical back: Normal range of motion and neck supple.      Comments: Patient has pain to palpation to the lower thoracic spine and lumbar spine, pinpoint tenderness to the lower lumbar spine, limited range of motion due to pain, patient is neurologically neurovascularly intact, no loss of bowel or bladder control or saddle anesthesia    Pain to palpation to the left elbow with skin tear/bruising noted, no deformity identified    Pain to palpation of the right shoulder, full range of motion intact, pulses palpable bilaterally    Skin:     General: Skin is warm and dry.   Neurological:      Mental Status: She is alert and oriented to person, place, and time.   Psychiatric:         Mood and Affect: Mood normal.         Behavior: Behavior normal.         Thought Content: Thought content normal.         Judgment: Judgment normal.      Comments: Hard of hearing         Procedures           ED Course  ED Course as of 07/23/25 0831   Mon Jul 21, 2025 2120 Work up remains pending. This will be a turn over for Destini OVERTON [TW]   2125 Assumed care of patient from TIAN Varela.  [BS]   2150 CBC shows no leukocytosis, hemoglobin is 11.8.  CMP shows glucose 127, calcium 10, GFR 82.8, PT 26.3, INR 2.26, PTT 36.8. [BS]   2152 CT lumbar spine without contrast     IMPRESSION:     1. Acute appearing 2 column L2 compression fracture with 50% vertebral  body height loss.  2. Old appearing L1 superior endplate compression fracture.   [BS]   2152 CT thoracic spine without contrast  IMPRESSION:     1. No acute fracture or traumatic malalignment in the thoracic spine.  2. Old appearing mild superior endplate deformity of T12 with associated  Schmorl's node.  3. Lumbar spine discussed separately.   [BS]   2152 CT cervical spine without contrast  IMPRESSION:     No acute fracture or traumatic malalignment.      [BS]   2153 CT head without contrast  No acute intracranial abnormality. [BS]   2153 X-ray left elbow  IMPRESSION:     No acute osseous finding.      [BS]   2153 X-ray right shoulder  IMPRESSION:     No acute osseous finding.      [BS]   2153 X-ray of bilateral hips  IMPRESSION:     No acute osseous finding.   [BS]   2154 Placed a call to neuro surgery.   [BS]   2212 Spoke with Dr. Newsome who recommended admission for pain control and evaluation by neurosurgery tomorrow, or DC with a brace and have her follow up outpatient. She decided to stay for pain control and consult tomorrow. Will place a call to patients PCP for admission.  [BS]   2222 Spoke  with Dr. Preston, who accepted the patient onto their services. Patient will be admitted to the hospital shortly in a stable condition.  [BS]      ED Course User Index  [BS] Destini Felipe APRN  [TW] Nichole Dorantes APRN                                                       Medical Decision Making  Patient is a 89-year-old female who presents to the ER due to a fall.  Patient states that she was cooking dinner tonight when she describes a mechanical fall.  She states that she had picked up potatoes that she had cooked on the stove and was transferring them to the sink when she got twisted up on her footing causing her to fall.  She mainly complains of low back pain due to the fall.  Denies any loss of bowel or bladder control or saddle anesthesia.  She reports previous history of a spinal fracture.  She admits to hitting her head without loss of consciousness.  She is on Coumadin for history of atrial fibrillation.  She complains of left elbow and right shoulder pain as well due to the fall.  She is neurologically intact.  Due to symptoms described came to the emergency department for evaluation and treatment.  Past medical history significant for atrial fibrillation, bradycardia, breast cancer, hyperlipidemia, hypertension, osteoporosis    Differential diagnosis includes but not limited to spinal fracture, bony fracture, contusion, skin tear, sprain, muscle strain, and other etiologies    Amount and/or Complexity of Data Reviewed  Labs: ordered.  Radiology: ordered.    Risk  Prescription drug management.  Decision regarding hospitalization.        Final diagnoses:   Impaired mobility [Z74.09]       ED Disposition  ED Disposition       ED Disposition   Decision to Admit    Condition   --    Comment   Level of Care: Med/Surg [1]   Diagnosis: L2 vertebral fracture [965368]   Admitting Physician: KELTON PRESTON [[id number]]   Attending Physician: KELTON PRESTON [[id number]]   Certification: I Certify That  Inpatient Hospital Services Are Medically Necessary For Greater Than 2 Midnights                 No follow-up provider specified.       Medication List      No changes were made to your prescriptions during this visit.            Nichole Dorantes, APRN  07/23/25 0892

## 2025-07-22 NOTE — CONSULTS
Reason for consult  L2 compression fracture    Chief Complaint   Patient presents with    Fall         Requesting provider:Dr Preston    HPI: Yudi is a 89-year-old female we are asked to see for L2 compression fracture.  History of remote L1 compression fracture that was treated conservatively.  Past medical history is significant for atrial fibrillation,(on Coumadin), breast cancer, bradycardia, hyperlipidemia, hypertension and osteoporosis.  She fell last night while she was cooking dinner.  She indicates that she lost her balance, fell to the ground, landing on her buttocks.  She denies any LOC.  She complains of back pain but currently is resting in bed and is fairly comfortable.  She denies any radiating pain to the lower extremities as well as focal weakness and paresthesias.  She has a TLSO brace that her son brought in today.  She has not yet tried to ambulate with the brace to see if her pain is tolerable.    Review of Systems   Constitutional:  Negative for diaphoresis and fever.   Cardiovascular:  Negative for chest pain.   Musculoskeletal:  Positive for back pain. Negative for neck pain.   Neurological:  Negative for weakness and headaches.   Psychiatric/Behavioral:  Negative for confusion.         Pertinent positives/negatives documented in HPI.  All other systems reviewed and negative.    Past Medical History:  has a past medical history of Atrial fibrillation, Bradycardia, Cancer (2002), Hyperlipidemia, Hypertension, and Osteoporosis.    Past Surgical History:  has a past surgical history that includes Mastectomy (Bilateral); Appendectomy; Hysterectomy; Esophagogastroduodenoscopy (N/A, 11/9/2021); Cardiac electrophysiology procedure (N/A, 11/10/2021); and Insert / replace / remove pacemaker.    Family History: family history includes Cancer in her brother; Heart attack in her brother; Hypertension in her father.    Social History:  reports that she has never smoked. She has never been exposed to  "tobacco smoke. She has never used smokeless tobacco. She reports that she does not drink alcohol and does not use drugs.    Allergies: Morphine and Penicillins    Medications: Scheduled Meds:atorvastatin, 20 mg, Oral, Daily  escitalopram, 20 mg, Oral, Daily  lisinopril, 10 mg, Oral, Nightly  pantoprazole, 40 mg, Oral, QAM      Continuous Infusions:   PRN Meds:.  acetaminophen **OR** acetaminophen **OR** acetaminophen    benzonatate    senna-docusate sodium **AND** polyethylene glycol **AND** bisacodyl **AND** bisacodyl    Calcium Replacement - Follow Nurse / BPA Driven Protocol    furosemide    HYDROcodone-acetaminophen    Magnesium Standard Dose Replacement - Follow Nurse / BPA Driven Protocol    ondansetron ODT **OR** ondansetron    Phosphorus Replacement - Follow Nurse / BPA Driven Protocol    Potassium Replacement - Follow Nurse / BPA Driven Protocol    [COMPLETED] Insert Peripheral IV **AND** sodium chloride     Objective:  General Appearance:  Comfortable and in no acute distress.    Vital signs: (most recent): Blood pressure 145/57, pulse 61, temperature 98.2 °F (36.8 °C), temperature source Oral, resp. rate 18, height 165.1 cm (65\"), weight 64 kg (141 lb 3.2 oz), SpO2 94%, not currently breastfeeding.  Vital signs are normal.  No fever.    Output: Producing urine.    Lungs:  Normal effort.  (Supplemental O2, per nasal cannula)  Heart: Normal rate.    Skin:  Warm and dry.    Pulses: Distal pulses are intact.        Neurological Exam  Mental Status  Alert. Oriented to person, place, and time. Speech is normal. Language is fluent with no aphasia. Attention and concentration are normal. Fund of knowledge is appropriate for level of education.    Cranial Nerves  CN III, IV, VI: Normal lids and orbits bilaterally. Pupils equal round and reactive to light bilaterally.    Motor  Decreased muscle bulk throughout. Strength is 5/5 throughout all four extremities.    Sensory  Sensation is intact to light touch, " pinprick, vibration and proprioception in all four extremities.    Reflexes  Deep tendon reflexes are 2+ and symmetric in all four extremities.    Gait    Gait not tested.       Vital Signs  Temp:  [97.6 °F (36.4 °C)-98.4 °F (36.9 °C)] 98.2 °F (36.8 °C)  Heart Rate:  [57-64] 61  Resp:  [16-18] 18  BP: (145-185)/(49-76) 145/57    Physical Exam  Constitutional:       General: She is not in acute distress.Vital signs are normal.      Appearance: She is well-developed. She is not ill-appearing.   HENT:      Head: Normocephalic.   Eyes:      General: Lids are normal.      Conjunctiva/sclera: Conjunctivae normal.      Pupils: Pupils are equal, round, and reactive to light.   Cardiovascular:      Rate and Rhythm: Normal rate.      Pulses: Intact distal pulses.   Pulmonary:      Effort: Pulmonary effort is normal.   Musculoskeletal:      Cervical back: Normal range of motion.      Comments: Patient was instructed to cough forcefully without reproduction of significant back pain   Skin:     General: Skin is warm and dry.   Neurological:      Mental Status: She is alert and oriented to person, place, and time.      GCS: GCS eye subscore is 4. GCS verbal subscore is 5. GCS motor subscore is 6.      Cranial Nerves: No cranial nerve deficit.      Sensory: No sensory deficit.      Motor: Motor strength is normal.No weakness.      Deep Tendon Reflexes: Reflexes are normal and symmetric. Reflexes normal.   Psychiatric:         Speech: Speech normal.         Behavior: Behavior normal.         Thought Content: Thought content normal.         Results Review:   I reviewed the patient's new clinical results.  I reviewed the patient's new imaging results and agree with the interpretation.  I reviewed the patient's other test results and agree with the interpretation          Lab Results (last 24 hours)       Procedure Component Value Units Date/Time    Comprehensive Metabolic Panel [[id number]]  (Abnormal) Collected: 07/22/25 7169     Specimen: Blood Updated: 07/22/25 0523     Glucose 112 mg/dL      BUN 16.0 mg/dL      Creatinine 0.55 mg/dL      Sodium 135 mmol/L      Potassium 4.4 mmol/L      Chloride 101 mmol/L      CO2 24.0 mmol/L      Calcium 9.7 mg/dL      Total Protein 6.5 g/dL      Albumin 4.0 g/dL      ALT (SGPT) 17 U/L      AST (SGOT) 21 U/L      Alkaline Phosphatase 64 U/L      Total Bilirubin 1.0 mg/dL      Globulin 2.5 gm/dL      A/G Ratio 1.6 g/dL      BUN/Creatinine Ratio 29.1     Anion Gap 10.0 mmol/L      eGFR 85.6 mL/min/1.73     Narrative:      GFR Categories in Chronic Kidney Disease (CKD)              GFR Category          GFR (mL/min/1.73)    Interpretation  G1                    90 or greater        Normal or high (1)  G2                    60-89                Mild decrease (1)  G3a                   45-59                Mild to moderate decrease  G3b                   30-44                Moderate to severe decrease  G4                    15-29                Severe decrease  G5                    14 or less           Kidney failure    (1)In the absence of evidence of kidney disease, neither GFR category G1 or G2 fulfill the criteria for CKD.    eGFR calculation 2021 CKD-EPI creatinine equation, which does not include race as a factor    CBC Auto Differential [[id number]]  (Abnormal) Collected: 07/22/25 0445    Specimen: Blood Updated: 07/22/25 0456     WBC 6.94 10*3/mm3      RBC 3.84 10*6/mm3      Hemoglobin 11.7 g/dL      Hematocrit 37.6 %      MCV 97.9 fL      MCH 30.5 pg      MCHC 31.1 g/dL      RDW 13.7 %      RDW-SD 49.6 fl      MPV 10.2 fL      Platelets 183 10*3/mm3      Neutrophil % 75.8 %      Lymphocyte % 13.0 %      Monocyte % 7.8 %      Eosinophil % 1.9 %      Basophil % 0.9 %      Immature Grans % 0.6 %      Neutrophils, Absolute 5.27 10*3/mm3      Lymphocytes, Absolute 0.90 10*3/mm3      Monocytes, Absolute 0.54 10*3/mm3      Eosinophils, Absolute 0.13 10*3/mm3      Basophils, Absolute 0.06 10*3/mm3       Immature Grans, Absolute 0.04 10*3/mm3      nRBC 0.0 /100 WBC     Comprehensive Metabolic Panel [[id number]]  (Abnormal) Collected: 07/21/25 1954    Specimen: Blood Updated: 07/21/25 2028     Glucose 127 mg/dL      BUN 17.0 mg/dL      Creatinine 0.63 mg/dL      Sodium 137 mmol/L      Potassium 4.2 mmol/L      Chloride 101 mmol/L      CO2 23.0 mmol/L      Calcium 10.0 mg/dL      Total Protein 7.2 g/dL      Albumin 4.4 g/dL      ALT (SGPT) 20 U/L      AST (SGOT) 25 U/L      Alkaline Phosphatase 71 U/L      Total Bilirubin 0.7 mg/dL      Globulin 2.8 gm/dL      A/G Ratio 1.6 g/dL      BUN/Creatinine Ratio 27.0     Anion Gap 13.0 mmol/L      eGFR 82.8 mL/min/1.73     Narrative:      GFR Categories in Chronic Kidney Disease (CKD)              GFR Category          GFR (mL/min/1.73)    Interpretation  G1                    90 or greater        Normal or high (1)  G2                    60-89                Mild decrease (1)  G3a                   45-59                Mild to moderate decrease  G3b                   30-44                Moderate to severe decrease  G4                    15-29                Severe decrease  G5                    14 or less           Kidney failure    (1)In the absence of evidence of kidney disease, neither GFR category G1 or G2 fulfill the criteria for CKD.    eGFR calculation 2021 CKD-EPI creatinine equation, which does not include race as a factor    aPTT [[id number]]  (Abnormal) Collected: 07/21/25 1954    Specimen: Blood Updated: 07/21/25 2016     PTT 36.8 seconds     Narrative:      PTT = The equivalent PTT values for the therapeutic range of heparin levels at 0.3 to 0.7 U/ml are 77 - 99 seconds.    Protime-INR [[id number]]  (Abnormal) Collected: 07/21/25 1954    Specimen: Blood Updated: 07/21/25 2016     Protime 26.3 Seconds      INR 2.26    CBC & Differential [[id number]]  (Abnormal) Collected: 07/21/25 1954    Specimen: Blood Updated: 07/21/25 2006    Narrative:      The following  orders were created for panel order CBC & Differential.  Procedure                               Abnormality         Status                     ---------                               -----------         ------                     CBC Auto Differential[[id number]]        Abnormal            Final result                 Please view results for these tests on the individual orders.    CBC Auto Differential [[id number]]  (Abnormal) Collected: 07/21/25 1954    Specimen: Blood Updated: 07/21/25 2006     WBC 9.28 10*3/mm3      RBC 3.95 10*6/mm3      Hemoglobin 11.8 g/dL      Hematocrit 37.5 %      MCV 94.9 fL      MCH 29.9 pg      MCHC 31.5 g/dL      RDW 13.6 %      RDW-SD 47.7 fl      MPV 9.7 fL      Platelets 215 10*3/mm3      Neutrophil % 80.6 %      Lymphocyte % 10.9 %      Monocyte % 6.5 %      Eosinophil % 0.6 %      Basophil % 0.4 %      Immature Grans % 1.0 %      Neutrophils, Absolute 7.48 10*3/mm3      Lymphocytes, Absolute 1.01 10*3/mm3      Monocytes, Absolute 0.60 10*3/mm3      Eosinophils, Absolute 0.06 10*3/mm3      Basophils, Absolute 0.04 10*3/mm3      Immature Grans, Absolute 0.09 10*3/mm3      nRBC 0.0 /100 WBC           CT Lumbar Spine Without Contrast  Result Date: 7/21/2025   1. Acute appearing 2 column L2 compression fracture with 50% vertebral body height loss. 2. Old appearing L1 superior endplate compression fracture.   This report was signed and finalized on 7/21/2025 8:54 PM by John Henry.      CT Thoracic Spine Without Contrast  Result Date: 7/21/2025   1. No acute fracture or traumatic malalignment in the thoracic spine. 2. Old appearing mild superior endplate deformity of T12 with associated Schmorl's node. 3. Lumbar spine discussed separately.    This report was signed and finalized on 7/21/2025 8:48 PM by John Henry.      CT Cervical Spine Without Contrast  Result Date: 7/21/2025   No acute fracture or traumatic malalignment.    This report was signed and finalized on 7/21/2025 8:43  PM by John Henry.      CT Head Without Contrast  Result Date: 7/21/2025  Impression:   No acute intracranial abnormality.  This report was signed and finalized on 7/21/2025 8:37 PM by John Henry.      XR Elbow 3+ View Left  Result Date: 7/21/2025   No acute osseous finding.   This report was signed and finalized on 7/21/2025 7:52 PM by John Henry.      XR Shoulder 2+ View Right  Result Date: 7/21/2025   No acute osseous finding.   This report was signed and finalized on 7/21/2025 7:51 PM by John Henry.      XR Hips Bilateral With or Without Pelvis 5 View  Result Date: 7/21/2025   No acute osseous finding.   This report was signed and finalized on 7/21/2025 7:50 PM by John Henry.       Imaging was reviewed by myself and Dr. Newsome and agree with above radiology interpretation.  Additionally, there is no retropulsion at L2.    Assessment/Plan:   I discussed CT findings in detail with the patient and her son.  She has an acute appearing compression fracture of L2 without significant retropulsion.  She has not yet tried to ambulate with TLSO support.  She is neurologically stable.    Recommend:  PT/OT and see how patient does with bracing.  If her pain is tolerable, we can treat conservatively.  If patient has significant pain despite bracing, consider kyphoplasty for fracture stabilization and pain control.  TLSO when out of bed  No lifting greater than 10 pounds  Continue Norco as current for pain  Coumadin has been held by primary.  Further recommendations after PT/OT evaluation.      L2 vertebral fracture    Atrial fibrillation    Age related osteoporosis    Chronic diastolic CHF (congestive heart failure)    Severe tricuspid regurgitation      I discussed the patient's findings and my recommendations with patient, family, nursing staff, primary care team, and consulting provider    Clarisse Poe PA-C  07/22/25  08:50 CDT    I spent 35 minutes caring for Yudi on this date of service. This  time includes time spent by me in the following activities: preparing for the visit, reviewing tests, performing a medically appropriate examination and/or evaluation, counseling and educating the patient/family/caregiver, referring and communicating with other health care professionals, documenting information in the medical record, independently interpreting results and communicating that information with the patient/family/caregiver, care coordination, ordering medications, ordering test(s), ordering procedure(s), obtaining a separately obtained history, and reviewing a separately obtained history

## 2025-07-22 NOTE — CASE MANAGEMENT/SOCIAL WORK
Continued Stay Note  [facility]     Patient Name: Yudi Westbrook  MRN: [id number]  Today's Date: 7/22/2025    Admit Date: 7/21/2025        Discharge Plan       Row Name 07/22/25 1418       Plan    Plan Comments Awaiting PT/OT evaluations to determine dc plan/needs.                   Discharge Codes    No documentation.                       RAMON Rush

## 2025-07-22 NOTE — THERAPY EVALUATION
Patient Name: Yudi Westbrook  : 1932    MRN: [id number]                              Today's Date: 2025       Admit Date: 2025    Visit Dx:     ICD-10-CM ICD-9-CM   1. Impaired mobility [Z74.09]  Z74.09 799.89     Patient Active Problem List   Diagnosis    Atrial fibrillation    Bradycardia    Hypertension    Compression fracture of L1 vertebra with routine healing    BMI 22.0-22.9, adult    Hyperlipidemia    Dysphagia    Presence of cardiac pacemaker    Long term (current) use of anticoagulants    Pulmonary hypertension    Cellulitis of trunk    Nonrheumatic tricuspid valve regurgitation moderate 2021    Age related osteoporosis    At high risk for fracture    Chronic diastolic CHF (congestive heart failure)    L2 vertebral fracture    Severe tricuspid regurgitation     Past Medical History:   Diagnosis Date    Atrial fibrillation     Bradycardia     Cancer     Breast    Hyperlipidemia     Hypertension     Osteoporosis      Past Surgical History:   Procedure Laterality Date    APPENDECTOMY      CARDIAC ELECTROPHYSIOLOGY PROCEDURE N/A 11/10/2021    Procedure: Device Implant single chamber mri;  Surgeon: Charles Nunez MD;  Location:  [facility];  Service: Cardiology;  Laterality: N/A;    ENDOSCOPY N/A 2021    Esophageal ring, medium size HH, Chronic active gastritis    HYSTERECTOMY      INSERT / REPLACE / REMOVE PACEMAKER      MASTECTOMY Bilateral       General Information       Row Name 25 1504          Physical Therapy Time and Intention    Document Type evaluation  pt presents s/p fall at home Dx; L2 vertebral fx. Pt has h/o compression fx in spine and is hoping to treat conservatively with bracing  -AJ     Mode of Treatment physical therapy  -AJ       Row Name 25 1509          General Information    Patient Profile Reviewed yes  -AJ     Prior Level of Function independent:;transfer;gait;bed mobility;ADL's  son lives 2 blocks away and helps as needed   -     Existing Precautions/Restrictions spinal;TLSO;brace worn when out of bed;fall;oxygen therapy device and L/min  2L  -     Barriers to Rehab medically complex;physical barrier  -       Row Name 07/22/25 1504          Living Environment    Current Living Arrangements home  -     People in Home sibling(s)  sister that is 95, son lives 2 blocks away  -       Row Name 07/22/25 1504          Home Main Entrance    Number of Stairs, Main Entrance two  -     Stair Railings, Main Entrance railing on right side (ascending)  -       Row Name 07/22/25 1504          Stairs Within Home, Primary    Number of Stairs, Within Home, Primary none  -       Row Name 07/22/25 1504          Cognition    Orientation Status (Cognition) oriented x 4  -       Row Name 07/22/25 1504          Safety Issues/Impairments Affecting Functional Mobility    Impairments Affecting Function (Mobility) balance;endurance/activity tolerance;pain  -               User Key  (r) = Recorded By, (t) = Taken By, (c) = Cosigned By      Initials Name Provider Type    John Ray, PT DPT Physical Therapist                   Mobility       Row Name 07/22/25 1504          Bed Mobility    Bed Mobility rolling right;supine-sit  -     Rolling Right [place] (Bed Mobility) contact guard;verbal cues  -     Supine-Sit [place] (Bed Mobility) contact guard;verbal cues  -     Assistive Device (Bed Mobility) bed rails;head of bed elevated  -[facility] Name 07/22/25 1504          Bed-Chair Transfer    Bed-Chair [place] (Transfers) contact guard  -     Assistive Device (Bed-Chair Transfers) walker, front-wheeled  -       Row Name 07/22/25 1504          Sit-Stand Transfer    Sit-Stand [place] (Transfers) contact guard;verbal cues  -     Assistive Device (Sit-Stand Transfers) walker, 4-wheeled  -       Row Name 07/22/25 1504          Gait/Stairs (Locomotion)    [place] Level (Gait) contact guard;verbal cues;1  person to manage equipment  -     Assistive Device (Gait) walker, front-wheeled  -     Patient was able to Ambulate yes  -AJ     Distance in Feet (Gait) 140  -AJ     Deviations/Abnormal Patterns (Gait) bilateral deviations;base of support, narrow;stride length decreased  -AJ     Bilateral Gait Deviations forward flexed posture  -               User Key  (r) = Recorded By, (t) = Taken By, (c) = Cosigned By      Initials Name Provider Type    John Ray PT DPT Physical Therapist                   Obj/Interventions       Row Name 07/22/25 1504          Range of Motion Comprehensive    General Range of Motion bilateral lower extremity ROM WFL;bilateral upper extremity ROM WFL  -     Comment, General Range of Motion no formal testing, but reamined WFL to don TLSO while sitting EOB  -       Row Name 07/22/25 1504          Strength Comprehensive (MMT)    General Manual Muscle Testing (MMT) Assessment no strength deficits identified  -     Comment, General Manual Muscle Testing (MMT) Assessment 3+ to 4-/5  -       Row Name 07/22/25 1504          Balance    Balance Assessment sitting static balance;sitting dynamic balance;standing static balance;standing dynamic balance  -AJ     Static Sitting Balance independent  -AJ     Dynamic Sitting Balance independent  -AJ     Position, Sitting Balance supported;sitting in chair  -AJ     Static Standing Balance contact guard  -     Dynamic Standing Balance contact guard  -     Position/Device Used, Standing Balance supported;walker, front-wheeled  -     Balance Interventions sitting;standing;sit to stand;supported;static;dynamic  -       Row Name 07/22/25 1503          Sensory Assessment (Somatosensory)    Sensory Assessment (Somatosensory) sensation intact  -               User Key  (r) = Recorded By, (t) = Taken By, (c) = Cosigned By      Initials Name Provider Type    John Ray PT DPT Physical Therapist                   Goals/Plan       [facility]  Name 07/22/25 1504          Bed Mobility Goal 1 (PT)    Activity/Assistive Device (Bed Mobility Goal 1, PT) bed mobility activities, all  -AJ     [place] Level/Cues Needed (Bed Mobility Goal 1, PT) standby assist  -AJ     Time Frame (Bed Mobility Goal 1, PT) long term goal (LTG);10 days  -AJ     Strategies/Barriers (Bed Mobility Goal 1, PT) maintain spinal precautions  -AJ     Progress/Outcomes (Bed Mobility Goal 1, PT) new goal  -       Row Name 07/22/25 1504          Transfer Goal 1 (PT)    Activity/Assistive Device (Transfer Goal 1, PT) sit-to-stand/stand-to-sit;bed-to-chair/chair-to-bed;toilet;car transfer  -AJ     [place] Level/Cues Needed (Transfer Goal 1, PT) independent  -AJ     Time Frame (Transfer Goal 1, PT) long term goal (LTG);10 days  -AJ     Strategies/Barriers (Transfers Goal 1, PT) maintain spinal precautions  -AJ     Progress/Outcome (Transfer Goal 1, PT) new goal  -       Row Name 07/22/25 1504          Gait Training Goal 1 (PT)    Activity/Assistive Device (Gait Training Goal 1, PT) gait (walking locomotion);decrease asymmetrical patterns;decrease fall risk;diminish gait deviation;improve balance and speed;forward stepping;increase endurance/gait distance;increase energy conservation;assistive device use;walker, rolling  -AJ     [place] Level (Gait Training Goal 1, PT) standby assist  -AJ     Distance (Gait Training Goal 1, PT) 200' with pain 3/10 or less  -AJ     Time Frame (Gait Training Goal 1, PT) long term goal (LTG);10 days  -AJ     Progress/Outcome (Gait Training Goal 1, PT) new goal  -       Row Name 07/22/25 1504          Problem Specific Goal 1 (PT)    Problem Specific Goal 1 (PT) Pt will demo indep to maintain spinal precautions and don/doff TLSO, as needed to return to baseline with less pain  -AJ     Time Frame (Problem Specific Goal 1, PT) long-term goal (LTG);by discharge  -AJ     Progress/Outcome (Problem Specific Goal 1, PT) new [facility]  -       Row Name  07/22/25 1504          Therapy Assessment/Plan (PT)    Planned Therapy Interventions (PT) orthotic fitting/training;balance training;bed mobility training;patient/family education;home exercise program;gait training;postural re-education;lumbar stabilization;ROM (range of motion);strengthening;stair training;stretching;neuromuscular re-education;transfer training  -               User Key  (r) = Recorded By, (t) = Taken By, (c) = Cosigned By      Initials Name Provider Type    John Ray, PT DPT Physical Therapist                   Clinical Impression       Row Name 07/22/25 1504          Pain    Pretreatment Pain Rating 4/10  -AJ     Posttreatment Pain Rating 4/10  -     Pain Location back  -AJ     Pain Side/Orientation generalized  -     Pain Management Interventions nursing notified;activity modification encouraged;exercise or physical activity utilized  -     Response to Pain Interventions activity participation with tolerable pain  -AJ       Row Name 07/22/25 1501          Plan of Care Review    Plan of Care Reviewed With patient;family  -     Outcome Evaluation PT eval complete. Pt in fowlers position, AOx4 with complaints of back pain but is tolerable. Pt and son at bedside report indep at baseline but son lives a [place] away and can check on pt as needed. Due to pt having L2 fx, pt was educated on TLSO safety, and spinal precautions and verbalized understanding. Pt performed log roll and sat EOB with CGA and verbal cues. pt required assist to don TLSO and required adjustement of chest plate for comfort bth sitting up in chair and in standing. Pt stood and ambulated 140' with CGA and verbal cues with FWW and responded well to PT session, as no pain increase throughout mobility, and left in recliner at end of session. Pt is hopeful to return home but remains at risk for falls and will benefit from PT services to return to PLOF. PT recommends IP rehab vs sub acute rehab to return to PLOF  prior to returning home, as she states her son cannot be there 24/7. PT will continue to follow and progress as tolerated  -AJ       Row Name 07/22/25 1504          Therapy Assessment/Plan (PT)    Patient/Family Therapy Goals Statement (PT) get better and go home  -AJ     Rehab Potential (PT) good  -AJ     Criteria for Skilled Interventions Met (PT) yes;meets criteria;skilled treatment is necessary  -AJ     Therapy Frequency (PT) 2 times/day  -AJ     Predicted Duration of Therapy Intervention (PT) until d/c  -AJ       Row Name 07/22/25 1504          Positioning and Restraints    Pre-Treatment Position in bed  -AJ     Post Treatment Position chair  -AJ     In Chair notified nsg;reclined;call light within reach;encouraged to call for assist;with family/caregiver;with nsg  -               User Key  (r) = Recorded By, (t) = Taken By, (c) = Cosigned By      Initials Name Provider Type    John Ray PT DPT Physical Therapist                   Outcome Measures       Row Name 07/22/25 1504          How much help from another person do you currently need...    Turning from your back to your side while in flat bed without using bedrails? 3  -AJ     Moving from lying on back to sitting on the side of a flat bed without bedrails? 3  -AJ     Moving to and from a bed to a chair (including a wheelchair)? 3  -AJ     Standing up from a chair using your arms (e.g., wheelchair, bedside chair)? 3  -AJ     Climbing 3-5 steps with a railing? 3  -AJ     To walk in hospital room? 3  -AJ     AM-PAC 6 Clicks Score (PT) 18  -AJ     Highest Level of Mobility Goal Walk 10 Steps or More-6  -AJ       Row Name 07/22/25 1504          Functional Assessment    Outcome Measure Options AM-PAC 6 Clicks Basic Mobility (PT)  -               User Key  (r) = Recorded By, (t) = Taken By, (c) = Cosigned By      Initials Name Provider Type    John Ray PT DPT Physical Therapist                                 Physical Therapy Education        Title: PT OT SLP Therapies (Done)       Topic: Physical Therapy (Done)       Point: Mobility training (Done)       Learning Progress Summary            Patient Acceptance, E, VU,NR by DAVID at 7/22/2025 1612    Comment: role of PT, TLSO when OOB< spainl precautions, progressive mobility   Family Acceptance, E, VU,NR by DAVID at 7/22/2025 1612    Comment: role of PT, TLSO when OOB< spainl precautions, progressive mobility                      Point: Home exercise program (Done)       Learning Progress Summary            Patient Acceptance, E, VU,NR by DAVID at 7/22/2025 1612    Comment: role of PT, TLSO when OOB< spainl precautions, progressive mobility   Family Acceptance, E, VU,NR by DAVID at 7/22/2025 1612    Comment: role of PT, TLSO when OOB< spainl precautions, progressive mobility                      Point: Body mechanics (Done)       Learning Progress Summary            Patient Acceptance, E, VU,NR by DAVID at 7/22/2025 1612    Comment: role of PT, TLSO when OOB< spainl precautions, progressive mobility   Family Acceptance, E, VU,NR by DAVID at 7/22/2025 1612    Comment: role of PT, TLSO when OOB< spainl precautions, progressive mobility                      Point: Precautions (Done)       Learning Progress Summary            Patient Acceptance, E, VU,NR by DAVID at 7/22/2025 1612    Comment: role of PT, TLSO when OOB< spainl precautions, progressive mobility   Family Acceptance, E, VU,NR by DAVID at 7/22/2025 1612    Comment: role of PT, TLSO when OOB< spainl precautions, progressive mobility                                      User Key       Initials Effective Dates Name Provider Type Discipline    DAVID 08/15/24 -  John Newsome, PT DPT Physical Therapist PT                  PT Recommendation and Plan  Planned Therapy Interventions (PT): orthotic fitting/training, balance training, bed mobility training, patient/family education, home exercise program, gait training, postural re-education, lumbar stabilization, ROM (range of  motion), strengthening, stair training, stretching, neuromuscular re-education, transfer training  Outcome Evaluation: PT eval complete. Pt in fowlers position, AOx4 with complaints of back pain but is tolerable. Pt and son at bedside report indep at baseline but son lives a few [place] away and can check on pt as needed. Due to pt having L2 fx, pt was educated on TLSO safety, and spinal precautions and verbalized understanding. Pt performed log roll and sat EOB with CGA and verbal cues. pt required assist to don TLSO and required adjustement of chest plate for comfort bth sitting up in chair and in standing. Pt stood and ambulated 140' with CGA and verbal cues with FWW and responded well to PT session, as no pain increase throughout mobility, and left in recliner at end of session. Pt is hopeful to return home but remains at risk for falls and will benefit from PT services to return to PLOF. PT recommends IP rehab vs sub acute rehab to return to PLOF prior to returning home, as she states her son cannot be there 24/7. PT will continue to follow and progress as tolerated     Time Calculation:         PT Charges       Row Name 07/22/25 1504             Time Calculation    Start Time 1504  -AJ      Stop Time 1558  -AJ      Time Calculation (min) 54 min  -AJ      PT Received On 07/22/25  -AJ      PT Goal Re-Cert Due Date 08/01/25  -AJ         Untimed Charges    PT Eval/Re-eval Minutes 54  -AJ         Total Minutes    Untimed Charges Total Minutes 54  -AJ       Total Minutes 54  -AJ                User Key  (r) = Recorded By, (t) = Taken By, (c) = Cosigned By      Initials Name Provider Type    AJ John Newsome, PT DPT Physical Therapist                  Therapy Charges for Today       Code Description Service Date Service Provider Modifiers Qty    [id number] HC PT EVAL MOD COMPLEXITY 4 7/22/2025 John Newsome, PT DPT GP 1            PT G-Codes  Outcome Measure Options: AM-PAC 6 Clicks Basic Mobility (PT)  AM-PAC 6  Clicks Score (PT): 18  PT Discharge Summary  Anticipated Discharge Disposition (PT): inpatient rehabilitation facility, sub acute care setting    John Newsome, ILIANA DPT  7/22/2025

## 2025-07-22 NOTE — PLAN OF CARE
Goal Outcome Evaluation:  Plan of Care Reviewed With: patient, family           Outcome Evaluation: PT eval complete. Pt in fowlers position, AOx4 with complaints of back pain but is tolerable. Pt and son at bedside report indep at baseline but son lives a few [place] away and can check on pt as needed. Due to pt having L2 fx, pt was educated on TLSO safety, and spinal precautions and verbalized understanding. Pt performed log roll and sat EOB with CGA and verbal cues. pt required assist to don TLSO and required adjustement of chest plate for comfort bth sitting up in chair and in standing. Pt stood and ambulated 140' with CGA and verbal cues with FWW and responded well to PT session, as no pain increase throughout mobility, and left in recliner at end of session. Pt is hopeful to return home but remains at risk for falls and will benefit from PT services to return to PLOF. PT recommends IP rehab vs sub acute rehab to return to PLOF prior to returning home, as she states her son cannot be there 24/7. PT will continue to follow and progress as tolerated    Anticipated Discharge Disposition (PT): inpatient rehabilitation facility, sub acute care setting

## 2025-07-22 NOTE — PLAN OF CARE
Goal Outcome Evaluation:  Plan of Care Reviewed With: patient           Outcome Evaluation: Pt received from ER. A&Ox4. CHG complete, NPO@0000. Medications given per orders, PRN Norco given with positive effect. On 2L baseline. IV to RAC IID. Safety maintained, call light within reach.

## 2025-07-22 NOTE — H&P
History and Physical    Patient:  Yudi Westbrook  MRN: [id number]    CHIEF COMPLAINT: Back pain, fall    History Obtained From: the patient   PCP: Elia Preston MD    HISTORY OF PRESENT ILLNESS:   The patient is a 89 y.o. female who presents with fall at home.  She was just admitted with a diastolic CHF exacerbation but had a mechanical fall at home on day of admission.  Was turning while holding a pot of boiling potatoes and lost her balance.  Slid backwards did hit her head on a table behind her but mostly fell on her buttocks.  Noted acute back pain.  Called her son who came and helped her get up and get to the ER.  No new neurologic deficits.    REVIEW OF SYSTEMS:    Review of Systems   Constitutional:  Negative for chills and fever.   HENT:  Negative for congestion and sore throat.    Respiratory:  Negative for shortness of breath and wheezing.    Gastrointestinal:  Negative for diarrhea and nausea.   Genitourinary:  Negative for dysuria.   Musculoskeletal:  Positive for back pain.   Psychiatric/Behavioral:  Negative for agitation and confusion.           Past Medical History:  Past Medical History:   Diagnosis Date    Atrial fibrillation     Bradycardia     Cancer 2002    Breast    Hyperlipidemia     Hypertension     Osteoporosis        Past Surgical History:  Past Surgical History:   Procedure Laterality Date    APPENDECTOMY      CARDIAC ELECTROPHYSIOLOGY PROCEDURE N/A 11/10/2021    Procedure: Device Implant single chamber mri;  Surgeon: Charles Nunez MD;  Location: [facility] INVASIVE LOCATION;  Service: Cardiology;  Laterality: N/A;    ENDOSCOPY N/A 11/9/2021    Esophageal ring, medium size HH, Chronic active gastritis    HYSTERECTOMY      INSERT / REPLACE / REMOVE PACEMAKER      MASTECTOMY Bilateral        Medications Prior to Admission:    Medications Prior to Admission   Medication Sig Dispense Refill Last Dose/Taking    benzonatate (TESSALON) 100 MG capsule Take 1 capsule by mouth 3  "(Three) Times a Day As Needed for Cough.   Taking As Needed    Calcium Carb-Cholecalciferol 600-200 MG-UNIT tablet Take 1 tablet by mouth Daily.   Taking    escitalopram (LEXAPRO) 20 MG tablet Take 1 tablet by mouth Daily.   Taking    fluticasone (FLONASE) 50 MCG/ACT nasal spray Administer 2 sprays into the nostril(s) as directed by provider Daily As Needed for Allergies. prn   Taking As Needed    furosemide (Lasix) 20 MG tablet Take 1 tablet by mouth Daily As Needed (swelling or shortness of breath). 90 tablet 0 Taking As Needed    lisinopril (PRINIVIL,ZESTRIL) 10 MG tablet Take 1 tablet by mouth Every Night. 90 tablet 3 Taking    pantoprazole (PROTONIX) 40 MG EC tablet Take 1 tablet by mouth Every Morning.   Taking    simvastatin (ZOCOR) 20 MG tablet Take 1 tablet by mouth Every Night.   Taking    vitamin B-12 (CYANOCOBALAMIN) 1000 MCG tablet Take 1 tablet by mouth Daily.   Taking    Vitamin E 100 units tablet Take 1 tablet by mouth Daily.   Taking    warfarin (COUMADIN) 2.5 MG tablet Take 1 tablet by mouth Every 7 (Seven) Days. Saturday   Taking    warfarin (COUMADIN) 7.5 MG tablet Take 1 tablet by mouth 6 (Six) Times a Week. Everyday except Saturday   Taking       Allergies:  Morphine and Penicillins    Social History:   Social History     Socioeconomic History    Marital status:    Tobacco Use    Smoking status: Never     Passive exposure: Never    Smokeless tobacco: Never   Vaping Use    Vaping status: Never Used   Substance and Sexual Activity    Alcohol use: No    Drug use: No    Sexual activity: Defer       Family History:   Family History   Problem Relation Age of Onset    Hypertension Father     Heart attack Brother     Cancer Brother     Colon cancer Neg Hx     Colon polyps Neg Hx            Physical Exam:    Vitals: /49 (BP Location: Left arm, Patient Position: Lying)   Pulse 58   Temp 98.2 °F (36.8 °C) (Oral)   Resp 16   Ht 165.1 cm (65\")   Wt 64 kg (141 lb 3.2 oz)   SpO2 95%   BMI " 23.50 kg/m²   Physical Exam  Constitutional:       Appearance: She is well-developed.      Comments: Elderly and frail   HENT:      Head: Normocephalic and atraumatic.   Eyes:      Conjunctiva/sclera: Conjunctivae normal.      Pupils: Pupils are equal, round, and reactive to light.   Cardiovascular:      Rate and Rhythm: Normal rate. Rhythm irregular.      Heart sounds: Murmur heard.      No friction rub.   Pulmonary:      Effort: Pulmonary effort is normal. No respiratory distress.      Breath sounds: Normal breath sounds. No wheezing or rales.   Abdominal:      General: Bowel sounds are normal. There is no distension.      Palpations: Abdomen is soft.      Tenderness: There is no abdominal tenderness.   Musculoskeletal:         General: Normal range of motion.      Cervical back: Normal range of motion.   Skin:     Capillary Refill: Capillary refill takes less than 2 seconds.   Neurological:      Mental Status: She is alert and oriented to person, place, and time.      Cranial Nerves: No cranial nerve deficit.   Psychiatric:         Behavior: Behavior normal.           Lab Results (last 24 hours)       Procedure Component Value Units Date/Time    Comprehensive Metabolic Panel [[id number]]  (Abnormal) Collected: 07/22/25 0445    Specimen: Blood Updated: 07/22/25 0523     Glucose 112 mg/dL      BUN 16.0 mg/dL      Creatinine 0.55 mg/dL      Sodium 135 mmol/L      Potassium 4.4 mmol/L      Chloride 101 mmol/L      CO2 24.0 mmol/L      Calcium 9.7 mg/dL      Total Protein 6.5 g/dL      Albumin 4.0 g/dL      ALT (SGPT) 17 U/L      AST (SGOT) 21 U/L      Alkaline Phosphatase 64 U/L      Total Bilirubin 1.0 mg/dL      Globulin 2.5 gm/dL      A/G Ratio 1.6 g/dL      BUN/Creatinine Ratio 29.1     Anion Gap 10.0 mmol/L      eGFR 85.6 mL/min/1.73     Narrative:      GFR Categories in Chronic Kidney Disease (CKD)              GFR Category          GFR (mL/min/1.73)    Interpretation  G1                    90 or greater         Normal or high (1)  G2                    60-89                Mild decrease (1)  G3a                   45-59                Mild to moderate decrease  G3b                   30-44                Moderate to severe decrease  G4                    15-29                Severe decrease  G5                    14 or less           Kidney failure    (1)In the absence of evidence of kidney disease, neither GFR category G1 or G2 fulfill the criteria for CKD.    eGFR calculation 2021 CKD-EPI creatinine equation, which does not include race as a factor    CBC Auto Differential [[id number]]  (Abnormal) Collected: 07/22/25 0445    Specimen: Blood Updated: 07/22/25 0456     WBC 6.94 10*3/mm3      RBC 3.84 10*6/mm3      Hemoglobin 11.7 g/dL      Hematocrit 37.6 %      MCV 97.9 fL      MCH 30.5 pg      MCHC 31.1 g/dL      RDW 13.7 %      RDW-SD 49.6 fl      MPV 10.2 fL      Platelets 183 10*3/mm3      Neutrophil % 75.8 %      Lymphocyte % 13.0 %      Monocyte % 7.8 %      Eosinophil % 1.9 %      Basophil % 0.9 %      Immature Grans % 0.6 %      Neutrophils, Absolute 5.27 10*3/mm3      Lymphocytes, Absolute 0.90 10*3/mm3      Monocytes, Absolute 0.54 10*3/mm3      Eosinophils, Absolute 0.13 10*3/mm3      Basophils, Absolute 0.06 10*3/mm3      Immature Grans, Absolute 0.04 10*3/mm3      nRBC 0.0 /100 WBC     Comprehensive Metabolic Panel [[id number]]  (Abnormal) Collected: 07/21/25 1954    Specimen: Blood Updated: 07/21/25 2028     Glucose 127 mg/dL      BUN 17.0 mg/dL      Creatinine 0.63 mg/dL      Sodium 137 mmol/L      Potassium 4.2 mmol/L      Chloride 101 mmol/L      CO2 23.0 mmol/L      Calcium 10.0 mg/dL      Total Protein 7.2 g/dL      Albumin 4.4 g/dL      ALT (SGPT) 20 U/L      AST (SGOT) 25 U/L      Alkaline Phosphatase 71 U/L      Total Bilirubin 0.7 mg/dL      Globulin 2.8 gm/dL      A/G Ratio 1.6 g/dL      BUN/Creatinine Ratio 27.0     Anion Gap 13.0 mmol/L      eGFR 82.8 mL/min/1.73     Narrative:      GFR Categories in  Chronic Kidney Disease (CKD)              GFR Category          GFR (mL/min/1.73)    Interpretation  G1                    90 or greater        Normal or high (1)  G2                    60-89                Mild decrease (1)  G3a                   45-59                Mild to moderate decrease  G3b                   30-44                Moderate to severe decrease  G4                    15-29                Severe decrease  G5                    14 or less           Kidney failure    (1)In the absence of evidence of kidney disease, neither GFR category G1 or G2 fulfill the criteria for CKD.    eGFR calculation 2021 CKD-EPI creatinine equation, which does not include race as a factor    aPTT [[id number]]  (Abnormal) Collected: 07/21/25 1954    Specimen: Blood Updated: 07/21/25 2016     PTT 36.8 seconds     Narrative:      PTT = The equivalent PTT values for the therapeutic range of heparin levels at 0.3 to 0.7 U/ml are 77 - 99 seconds.    Protime-INR [[id number]]  (Abnormal) Collected: 07/21/25 1954    Specimen: Blood Updated: 07/21/25 2016     Protime 26.3 Seconds      INR 2.26    CBC & Differential [[id number]]  (Abnormal) Collected: 07/21/25 1954    Specimen: Blood Updated: 07/21/25 2006    Narrative:      The following orders were created for panel order CBC & Differential.  Procedure                               Abnormality         Status                     ---------                               -----------         ------                     CBC Auto Differential[[id number]]        Abnormal            Final result                 Please view results for these tests on the individual orders.    CBC Auto Differential [[id number]]  (Abnormal) Collected: 07/21/25 1954    Specimen: Blood Updated: 07/21/25 2006     WBC 9.28 10*3/mm3      RBC 3.95 10*6/mm3      Hemoglobin 11.8 g/dL      Hematocrit 37.5 %      MCV 94.9 fL      MCH 29.9 pg      MCHC 31.5 g/dL      RDW 13.6 %      RDW-SD 47.7 fl      MPV 9.7 fL       Platelets 215 10*3/mm3      Neutrophil % 80.6 %      Lymphocyte % 10.9 %      Monocyte % 6.5 %      Eosinophil % 0.6 %      Basophil % 0.4 %      Immature Grans % 1.0 %      Neutrophils, Absolute 7.48 10*3/mm3      Lymphocytes, Absolute 1.01 10*3/mm3      Monocytes, Absolute 0.60 10*3/mm3      Eosinophils, Absolute 0.06 10*3/mm3      Basophils, Absolute 0.04 10*3/mm3      Immature Grans, Absolute 0.09 10*3/mm3      nRBC 0.0 /100 WBC              -----------------------------------------------------------------    Radiology:     CT Lumbar Spine Without Contrast  Result Date: 7/21/2025  Exam: CT LUMBAR SPINE WO CONTRAST-  HISTORY: fall; back pain  COMPARISON: MRI and CT 7/24/2020  DOSE LENGTH PRODUCT: 1743.58 mGy.cm mGy cm. Automated exposure control was also utilized to decrease patient radiation dose.  TECHNIQUE: Serial helical tomographic images of the lumbar spine were obtained without the use of intravenous contrast. Additionally, sagittal and coronal reformatted images were also provided for review.  FINDINGS:  Straightening of the lumbar lordosis.  Acute appearing 2 column L2 compression fracture with around 50 % vertebral body height loss centrally. Minimal posterior retropulsion.  Old appearing moderate L1 superior endplate compression fracture.  The vertebral body heights are preserved.  Mild to moderate multilevel degenerative disc disease and facet arthropathy.  Suspect moderate L3-L4 and at least mild to moderate L4-L5 spinal canal narrowing. No high-grade foraminal narrowing.  Other: Scattered atherosclerosis.       1. Acute appearing 2 column L2 compression fracture with 50% vertebral body height loss. 2. Old appearing L1 superior endplate compression fracture.   This report was signed and finalized on 7/21/2025 8:54 PM by John Henry.      CT Thoracic Spine Without Contrast  Result Date: 7/21/2025  Exam: CT THORACIC SPINE WO CONTRAST-  HISTORY: fall; back pain  COMPARISON: CT chest 11/7/2021, CT  abdomen pelvis 7/24/2020  DOSE LENGTH PRODUCT: 1743.58 mGy.cm mGy cm. Automated exposure control was also utilized to decrease patient radiation dose.  TECHNIQUE: Serial helical tomographic images of the thoracic spine were obtained without the use of intravenous contrast. Additionally, sagittal and coronal reformatted images were also provided for review.  FINDINGS:  Slightly exaggerated thoracic kyphosis.  Old appearing mild superior plate deformity of T12 with a Schmorl's node. Lumbar spine discussed on a separate report.  Mild multilevel spondylotic changes.  No visible significant spinal canal or foraminal narrowing.  Other: Moderate hiatal hernia with fluid-filled esophagus.       1. No acute fracture or traumatic malalignment in the thoracic spine. 2. Old appearing mild superior endplate deformity of T12 with associated Schmorl's node. 3. Lumbar spine discussed separately.    This report was signed and finalized on 7/21/2025 8:48 PM by John Henry.      CT Cervical Spine Without Contrast  Result Date: 7/21/2025  Exam: CT CERVICAL SPINE WO CONTRAST-  HISTORY: fall; trauma  COMPARISON: 7/24/2020  DOSE LENGTH PRODUCT: 1743.58 mGy.cm mGy cm. Automated exposure control was also utilized to decrease patient radiation dose.  TECHNIQUE: Serial helical tomographic images of the cervical spine were obtained without the use of intravenous contrast. Additionally, sagittal and coronal reformatted images were also provided for review.  FINDINGS:  Preserved alignment.  No acute fracture. Mild chronic anterior wedging of C4 and C5.  Mild multilevel spondylotic changes.  No visible significant bony spinal canal or foraminal narrowing.  Other:None       No acute fracture or traumatic malalignment.    This report was signed and finalized on 7/21/2025 8:43 PM by John Henry.      CT Head Without Contrast  Result Date: 7/21/2025  Exam: CT HEAD WO CONTRAST-  HISTORY: fall; trauma. Head pain.  DOSE LENGTH PRODUCT: 1743.58  mGy.cm mGy cm. Automated exposure control was also utilized to decrease patient radiation dose.  Technique: Helically acquired CT of the brain without IV contrast was performed. Sagittal and coronal reformations are also provided for review. Soft tissue and bone kernels are available for interpretation.  Comparison: 7/24/2020.  Findings:  Ventricles and extra-axial CSF spaces are normal in size.  No intraparenchymal or extra-axial hemorrhage.  Gray-white matter differentiation is preserved.  Orbits are grossly unremarkable. Paranasal sinuses are grossly clear. Mastoid air cells are grossly clear.  No suspicious calvarial or extracranial soft tissue abnormality.      Impression:   No acute intracranial abnormality.  This report was signed and finalized on 7/21/2025 8:37 PM by John Henry.      XR Elbow 3+ View Left  Result Date: 7/21/2025  EXAM: XR ELBOW 3+ VW LEFT-  INDICATION: fall; elbow pain  COMPARISON: None.  TECHNIQUE: 3 views left elbow.  FINDINGS:  No acute fracture or malalignment. Joint spaces are preserved. No sizable joint effusion.       No acute osseous finding.   This report was signed and finalized on 7/21/2025 7:52 PM by John Henry.      XR Shoulder 2+ View Right  Result Date: 7/21/2025  EXAM: XR SHOULDER 2+ VW RIGHT-  INDICATION: fall; shoulder pain  COMPARISON: None.  TECHNIQUE: 3 views right shoulder.  FINDINGS:  No acute fracture or malalignment. Right glenohumeral and AC joints are preserved.       No acute osseous finding.   This report was signed and finalized on 7/21/2025 7:51 PM by John Henry.      XR Hips Bilateral With or Without Pelvis 5 View  Result Date: 7/21/2025  EXAM: XR HIPS BILATERAL W OR WO PELVIS 5 VIEW-  INDICATION: fall; trauma- include pelvis  COMPARISON: 8/12/2024.  TECHNIQUE: 5 views pelvis and bilateral hips.  FINDINGS:  SI joints are symmetric with moderate degenerative changes. Pubic symphysis is preserved. Bilateral hip joint spaces are preserved. No acute  fracture or malalignment.       No acute osseous finding.   This report was signed and finalized on 7/21/2025 7:50 PM by John Henry.        Assessment and Plan   L2 compression fracture  Old L1 compression fracture now with new L2 compression fracture.  Previously she was managed conservatively with TLSO and she is interested in trying this again.  Son has already brought her TLSO to the hospital.  Neurosurgery consulted.  Advised that if pain is significant and upon ambulation may need to consider kyphoplasty and she is  amenable to this.    Atrial fibrillation  Hold Coumadin in the setting of possible surgical intervention.  Has pacemaker in place.    Chronic diastolic CHF  Volume status stable now and no signs of shortness of breath.  Will monitor and may need Lasix intermittently.    Disposition: Inpatient    CODE STATUS: DNR    DVT prophylaxis: SCDs, will resume warfarin if no procedure planned.      L2 vertebral fracture    Atrial fibrillation    Age related osteoporosis    Chronic diastolic CHF (congestive heart failure)    Severe tricuspid regurgitation      Elia Preston MD 7/22/2025 07:55 CDT

## 2025-07-22 NOTE — PLAN OF CARE
Goal Outcome Evaluation:              Outcome Evaluation: aox4. VSS. maintaining sats on 2L o2. Up x1 with walker and TLSO brace. SCD's bilaterally. Tolerating PO. voiding via purewick. Call light within reach

## 2025-07-22 NOTE — OUTREACH NOTE
CHF Week 1 Survey      Flowsheet Row Responses   [facility] facility patient discharged from? [place]   Does the patient have one of the following disease processes/diagnoses(primary or secondary)? CHF   CHF Week 1 attempt successful? No   Unsuccessful attempts Attempt 1   Revoke Readmitted            ALESHIA SULLIVAN - Registered Nurse

## 2025-07-23 ENCOUNTER — HOSPITAL ENCOUNTER (INPATIENT)
Age: 89
LOS: 9 days | Discharge: HOME HEALTH | DRG: 559 | End: 2025-08-01
Attending: STUDENT IN AN ORGANIZED HEALTH CARE EDUCATION/TRAINING PROGRAM | Admitting: STUDENT IN AN ORGANIZED HEALTH CARE EDUCATION/TRAINING PROGRAM
Payer: MEDICARE

## 2025-07-23 VITALS
DIASTOLIC BLOOD PRESSURE: 65 MMHG | HEIGHT: 65 IN | HEART RATE: 58 BPM | BODY MASS INDEX: 23.53 KG/M2 | SYSTOLIC BLOOD PRESSURE: 146 MMHG | RESPIRATION RATE: 16 BRPM | TEMPERATURE: 98.1 F | OXYGEN SATURATION: 95 % | WEIGHT: 141.2 LBS

## 2025-07-23 DIAGNOSIS — S32.000A LUMBAR COMPRESSION FRACTURE, CLOSED, INITIAL ENCOUNTER (HCC): Primary | ICD-10-CM

## 2025-07-23 PROCEDURE — 1180000000 HC REHAB R&B

## 2025-07-23 PROCEDURE — 97535 SELF CARE MNGMENT TRAINING: CPT

## 2025-07-23 PROCEDURE — 97110 THERAPEUTIC EXERCISES: CPT

## 2025-07-23 PROCEDURE — 6370000000 HC RX 637 (ALT 250 FOR IP): Performed by: STUDENT IN AN ORGANIZED HEALTH CARE EDUCATION/TRAINING PROGRAM

## 2025-07-23 PROCEDURE — 97166 OT EVAL MOD COMPLEX 45 MIN: CPT

## 2025-07-23 PROCEDURE — 97116 GAIT TRAINING THERAPY: CPT

## 2025-07-23 RX ORDER — ESCITALOPRAM OXALATE 20 MG/1
20 TABLET ORAL DAILY
COMMUNITY
Start: 2025-07-24

## 2025-07-23 RX ORDER — WARFARIN SODIUM 2.5 MG/1
2.5 TABLET ORAL DAILY
Status: ON HOLD | COMMUNITY
End: 2025-08-01

## 2025-07-23 RX ORDER — LANOLIN ALCOHOL/MO/W.PET/CERES
1000 CREAM (GRAM) TOPICAL DAILY
COMMUNITY
Start: 2025-07-24

## 2025-07-23 RX ORDER — SIMVASTATIN 20 MG
20 TABLET ORAL NIGHTLY
COMMUNITY

## 2025-07-23 RX ORDER — WARFARIN SODIUM 5 MG/1
5 TABLET ORAL NIGHTLY
Status: ON HOLD | COMMUNITY
End: 2025-08-01

## 2025-07-23 RX ORDER — ONDANSETRON 4 MG/1
4 TABLET, ORALLY DISINTEGRATING ORAL EVERY 8 HOURS PRN
Status: DISCONTINUED | OUTPATIENT
Start: 2025-07-23 | End: 2025-08-01 | Stop reason: HOSPADM

## 2025-07-23 RX ORDER — PANTOPRAZOLE SODIUM 40 MG/1
40 TABLET, DELAYED RELEASE ORAL
COMMUNITY

## 2025-07-23 RX ORDER — HYDROCODONE BITARTRATE AND ACETAMINOPHEN 5; 325 MG/1; MG/1
1 TABLET ORAL EVERY 6 HOURS PRN
Status: DISCONTINUED | OUTPATIENT
Start: 2025-07-23 | End: 2025-07-24

## 2025-07-23 RX ORDER — BENZONATATE 100 MG/1
100 CAPSULE ORAL 3 TIMES DAILY PRN
COMMUNITY

## 2025-07-23 RX ORDER — FLUTICASONE PROPIONATE 50 MCG
2 SPRAY, SUSPENSION (ML) NASAL DAILY PRN
COMMUNITY

## 2025-07-23 RX ORDER — LISINOPRIL 10 MG/1
10 TABLET ORAL NIGHTLY
COMMUNITY

## 2025-07-23 RX ORDER — FUROSEMIDE 20 MG/1
20 TABLET ORAL DAILY PRN
COMMUNITY

## 2025-07-23 RX ORDER — ASCORBATE CALCIUM 500 MG
100 TABLET ORAL DAILY
COMMUNITY
Start: 2025-07-24

## 2025-07-23 RX ADMIN — HYDROCODONE BITARTRATE AND ACETAMINOPHEN 1 TABLET: 5; 325 TABLET ORAL at 20:50

## 2025-07-23 RX ADMIN — PANTOPRAZOLE SODIUM 40 MG: 40 TABLET, DELAYED RELEASE ORAL at 06:28

## 2025-07-23 RX ADMIN — ACETAMINOPHEN 650 MG: 325 TABLET ORAL at 08:42

## 2025-07-23 RX ADMIN — ESCITALOPRAM 20 MG: 10 TABLET, FILM COATED ORAL at 08:43

## 2025-07-23 RX ADMIN — ATORVASTATIN CALCIUM 20 MG: 10 TABLET ORAL at 08:43

## 2025-07-23 ASSESSMENT — PAIN SCALES - WONG BAKER: WONGBAKER_NUMERICALRESPONSE: HURTS A LITTLE BIT

## 2025-07-23 ASSESSMENT — LIFESTYLE VARIABLES
HOW OFTEN DO YOU HAVE A DRINK CONTAINING ALCOHOL: NEVER
HOW MANY STANDARD DRINKS CONTAINING ALCOHOL DO YOU HAVE ON A TYPICAL DAY: PATIENT DOES NOT DRINK

## 2025-07-23 ASSESSMENT — PAIN SCALES - GENERAL: PAINLEVEL_OUTOF10: 9

## 2025-07-23 ASSESSMENT — PAIN DESCRIPTION - LOCATION: LOCATION: BACK

## 2025-07-23 ASSESSMENT — PAIN - FUNCTIONAL ASSESSMENT: PAIN_FUNCTIONAL_ASSESSMENT: ACTIVITIES ARE NOT PREVENTED

## 2025-07-23 ASSESSMENT — PAIN DESCRIPTION - ORIENTATION: ORIENTATION: LOWER

## 2025-07-23 ASSESSMENT — PAIN DESCRIPTION - DESCRIPTORS: DESCRIPTORS: ACHING

## 2025-07-23 NOTE — DISCHARGE PLACEMENT REQUEST
"To: [facility]    From: Bibi WHITE [phone]        Eliana Ortega (89 y.o. Female)       Date of Birth   02/25/1932    Social Security Number       Address   [address] [address]    Home Phone   [phone]    MRN   [id number]       [employer]   [employer]    Marital Status                               Admission Date   7/21/2025    Admission Type   Emergency    Admitting Provider   Elia Preston MD    Attending Provider   Elia Preston MD    Department, Room/Bed   [facility] 3A, 357/1       Discharge Date       Discharge Disposition       Discharge Destination                                 Attending Provider: Elia Preston MD    Allergies: Morphine, Penicillins    Isolation: None   Infection: None   Code Status: No CPR    Ht: 165.1 cm (65\")   Wt: 64 kg (141 lb 3.2 oz)    Admission Cmt: None   Principal Problem: L2 vertebral fracture [S32.029A]                   Active Insurance as of 7/21/2025       Primary Coverage       Payor Plan Insurance Group Employer/Plan Group    MEDICARE MEDICARE A & B        Payor Plan Address Payor Plan Phone Number Payor Plan Fax Number Effective Dates    [address] [phone]  2/1/1997 - None Entered    [facility] [id number]         Subscriber Name Subscriber Birth Date Member ID       ELIANA ORTEGA W 2/25/1932 [id number]               Secondary Coverage       Payor Plan Insurance Group Employer/Plan Group    ACE MEDICARE SUPPLEMENT ACE MEDICARE SUPPLEMENT G       Payor Plan Address Payor Plan Phone Number Payor Plan Fax Number Effective Dates    [address] [address]   1/1/2025 - None Entered    [facility] [id number]         Subscriber Name Subscriber Birth Date Member ID       ELIANA ORTEGA W 2/25/1932 [id number]                     Emergency Contacts        (Rel.) Home Phone Work Phone Mobile Phone    VEGA ORTEGA (Son) [phone] -- --              Insurance Information                  " MEDICARE/MEDICARE A & B Phone: [phone]    Subscriber: Yudi Westbrook Subscriber#: [id number]    Group#: -- Precert#: --    Authorization#: -- Effective Date: --        ACE MEDICARE SUPPLEMENT/ACE MEDICARE SUPPLEMENT Phone: --    Subscriber: Yudi Westbrook Subscriber#: [id number]    Group#: G Precert#: --    Authorization#: -- Effective Date: --

## 2025-07-23 NOTE — CASE MANAGEMENT/SOCIAL WORK
Discharge Planning Assessment  [facility]     Patient Name: Yudi Westbrook  MRN: [id number]  Today's Date: 7/23/2025    Admit Date: 7/21/2025        Discharge Needs Assessment       Row Name 07/23/25 0837       Living Environment    People in Home sibling(s)    Current Living Arrangements home    Primary Care Provided by self    Family Caregiver if Needed child(madie), adult    Family Caregiver Names Son - Gregg Westbrook    Quality of Family Relationships supportive;involved;helpful    Able to Return to Prior Arrangements yes       Resource/Environmental Concerns    Resource/Environmental Concerns none    Transportation Concerns none       Transportation Needs    In the past 12 months, has lack of transportation kept you from medical appointments or from getting medications? no    In the past 12 months, has lack of transportation kept you from meetings, work, or from getting things needed for daily living? No       Food Insecurity    Within the past 12 months, you worried that your food would run out before you got the money to buy more. Never true    Within the past 12 months, the food you bought just didn't last and you didn't have money to get more. Never true       Transition Planning    Patient/Family Anticipates Transition to inpatient rehabilitation facility    Patient/Family Anticipated Services at Transition rehabilitation services    Transportation Anticipated family or friend will provide;health plan transportation       Discharge Needs Assessment    Readmission Within the Last 30 Days no previous admission in last 30 days    Concerns to be Addressed care coordination/care conferences;discharge planning    Do you want help finding or keeping work or a job? I do not need or want help    Do you want help with school or training? For example, starting or completing job training or getting a high school diploma, GED or equivalent No    Anticipated Changes Related to Illness inability to care for self     Outpatient/Agency/Support Group Needs inpatient rehabilitation facility    Discharge Facility/Level of Care Needs acute rehab    Current Discharge Risk physical impairment;chronically ill    Discharge Coordination/Progress Patient prefers acute rehab at [facility] at discharge.  Can send referral once OT evaluation available as acute rehab requires two therapies.                   Discharge Plan    No documentation.                 Continued Care and Services - Admitted Since 7/21/2025    No active coordination exists.          Demographic Summary    No documentation.                  Functional Status    No documentation.                  Psychosocial    No documentation.                  Abuse/Neglect    No documentation.                  Legal    No documentation.                  Substance Abuse    No documentation.                  Patient Forms    No documentation.                     RAMON Rush

## 2025-07-23 NOTE — THERAPY DISCHARGE NOTE
Acute Care - Occupational Therapy Discharge Summary  [facility]     Patient Name: Yudi Westbrook  : 1932  MRN: [id number]    Today's Date: 2025  Onset of Illness/Injury or Date of Surgery: 25       Referring Physician: Dr. Preston      Admit Date: 2025        OT Recommendation and Plan    Visit Dx:    ICD-10-CM ICD-9-CM   1. Impaired mobility [Z74.09]  Z74.09 799.89          Time Calculation- OT       Row Name 25 1034 25 1027          Time Calculation- OT    OT Start Time 0939  -AC --     OT Stop Time 1020  -AC --     OT Time Calculation (min) 41 min  -AC --     OT Received On 25  - --     OT Goal Re-Cert Due Date 25  -AC --        Timed Charges    49399 - Gait Training Minutes  -- 19  -NW        Total Minutes    Timed Charges Total Minutes -- 19  -NW      Total Minutes -- 19  -NW               User Key  (r) = Recorded By, (t) = Taken By, (c) = Cosigned By      Initials Name Provider Type    AC August Hillman, OTR/L, CNT Occupational Therapist    NW Laura Dumont, PTA Physical Therapist Assistant                       OT Rehab Goals       Row Name 25 1400 25 0939          Occupational Therapy Goals    Transfer Goal Selection (OT) -- transfer, OT goal 1  -AC     Bathing Goal Selection (OT) -- bathing, OT goal 1  -AC     Dressing Goal Selection (OT) -- dressing, OT goal 1  -AC     Problem Specific Goal Selection (OT) -- problem specific goal 1, OT  -AC        Transfer Goal 1 (OT)    Activity/Assistive Device (Transfer Goal 1, OT) toilet;tub  -AC toilet;tub  -AC     [place] Level/Cues Needed (Transfer Goal 1, OT) modified independence  -AC modified independence  -AC     Time Frame (Transfer Goal 1, OT) long term goal (LTG);10 days  -AC long term goal (LTG);10 days  -AC     Progress/Outcome (Transfer Goal 1, OT) goal not met  -AC new goal  -AC        Bathing Goal 1 (OT)    Activity/Device (Bathing Goal 1, OT) bathing skills, all;grab bar,  tub/shower  -AC bathing skills, all;grab bar, tub/shower  -AC     [place] Level/Cues Needed (Bathing Goal 1, OT) standby assist  -AC standby assist  -AC     Time Frame (Bathing Goal 1, OT) long term goal (LTG);10 days  -AC long term goal (LTG);10 days  -AC     Strategies/Barriers (Bathing Goal 1, OT) standing  -AC standing  -AC     Progress/Outcomes (Bathing Goal 1, OT) goal not met  -AC new goal  -AC        Dressing Goal 1 (OT)    Activity/Device (Dressing Goal 1, OT) lower body dressing;upper body dressing  -AC lower body dressing;upper body dressing  -AC     [place]/Cues Needed (Dressing Goal 1, OT) independent  -AC independent  -AC     Time Frame (Dressing Goal 1, OT) long term goal (LTG);10 days  -AC long term goal (LTG);10 days  -AC     Progress/Outcome (Dressing Goal 1, OT) goal not met  -AC new goal  -AC        Problem Specific Goal 1 (OT)    Problem Specific Goal 1 (OT) Pt will independently implement one pain management technique to decrease pain and improve functional performance.  -AC Pt will independently implement one pain management technique to decrease pain and improve functional performance.  -AC     Time Frame (Problem Specific Goal 1, OT) long term goal (LTG);10 days  -AC long term goal (LTG);10 days  -AC     Progress/Outcome (Problem Specific Goal 1, OT) goal not met  -AC new goal  -AC               User Key  (r) = Recorded By, (t) = Taken By, (c) = Cosigned By      Initials Name Provider Type Discipline    AC August Hillman, OTR/L, CNT Occupational Therapist OT                     Outcome Measures       Row Name 07/23/25 0963             How much help from another is currently needed...    Putting on and taking off regular lower body clothing? 3  -AC      Bathing (including washing, rinsing, and drying) 3  -AC      Toileting (which includes using toilet bed pan or urinal) 4  -AC      Putting on and taking off regular upper body clothing 2  -AC      Taking care of personal grooming  (such as brushing teeth) 4  -AC      Eating meals 4  -AC      AM-PAC 6 Clicks Score (OT) 20  -AC         Functional Assessment    Outcome Measure Options AM-PAC 6 Clicks Daily Activity (OT)  -AC                User Key  (r) = Recorded By, (t) = Taken By, (c) = Cosigned By      Initials Name Provider Type    AC August Hillman, SOURAVR/L, PRAVEEN Occupational Therapist                        Therapy Charges for Today       Code Description Service Date Service Provider Modifiers Qty    [id number]  OT EVAL MOD COMPLEXITY 4 7/23/2025 August Hillman OTR/L, PRAVEEN GO 1            OT Discharge Summary  Anticipated Discharge Disposition (OT): inpatient rehabilitation facility  Reason for Discharge: Discharge from facility  Outcomes Achieved: Refer to plan of care for updates on goals achieved  Discharge Destination: Inpatient rehabilitation facility      VAZQUEZ Pablo/L, CNT  7/23/2025

## 2025-07-23 NOTE — PLAN OF CARE
Goal Outcome Evaluation:  Plan of Care Reviewed With: patient        Progress: no change  Outcome Evaluation: OT eval completed.  Pt alert and oriented x4.  C/o 3/10 LBP, increased to 5/10 with activity.  Pt lives with 96 y/o sister and is typically independent with mobility, ADL and IADL including cooking/cleaning.  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  Her sister often has bowel incontinence and requires assist for hygiene that the patient is responsible for providing.  Pt demo full AROM in BUE and decreased strength at 4-/5.  Pt needed maxA to adjust TLSO but donned/doffed shoes with SBA. Pt stood with CGA and amb to BR with rw and CGA. Completed toileting, hygiene and hand washing with SBA.  Pt needed Jesus for commode transfer and also simulated tub shower transfer with Jesus, unable to fully clear LEs over tub.  OT is recommended to address pt's weakness, decreased endurance, pain and safety.  She does well wtih basic ADL but will need further training and education for walker mgmt and IADLs to function safely in her home environment.  Recommend IP rehab.

## 2025-07-23 NOTE — DISCHARGE INSTRUCTIONS
Wear brace when out of bed, standing and walking.  It does not need to be worn when sitting.  Use caution to prevent falls  No lifting greater than 10 pounds  Follow-up in neurosurgery office in 2 weeks  Call neurosurgery office if you develop any worsening pain, focal weakness or other issues or concerns.

## 2025-07-23 NOTE — CASE MANAGEMENT/SOCIAL WORK
Continued Stay Note  [facility]     Patient Name: Yudi Westbrook  MRN: [id number]  Today's Date: 7/23/2025    Admit Date: 7/21/2025    Plan: Referral pending - [facility]   Discharge Plan       Row Name 07/23/25 1124       Plan    Plan Referral pending - [facility]    Plan Comments Referral sent to [facility], awaiting response.                   Discharge Codes    No documentation.                       MILES RushW

## 2025-07-23 NOTE — CASE MANAGEMENT/SOCIAL WORK
Continued Stay Note  [facility]     Patient Name: Yudi Westbrook  MRN: [id number]  Today's Date: 7/23/2025    Admit Date: 7/21/2025    Plan: [facility]   Discharge Plan       Row Name 07/23/25 1234       Plan    Plan [facility]    Plan Comments [facility] has accepted patient.  [facility] can accept patient today.  Patient's son can transport patient to [facility].  Call report to [phone].    Final Discharge Disposition Code 62 - inpatient rehab facility      Row Name 07/23/25 1124       Plan    Plan Referral pending - [facility]    Plan Comments Referral sent to [facility], awaiting response.                   Discharge Codes    No documentation.                       RAMON Rush

## 2025-07-23 NOTE — DISCHARGE SUMMARY
Hospital Discharge Summary    Yudi Westbrook  :  1932  MRN:  [id number]    Admit date:  2025  Discharge date:  2025    Admitting Physician:    Elia Preston MD    Discharge Diagnoses:      L2 vertebral fracture    Atrial fibrillation    Age related osteoporosis    Chronic diastolic CHF (congestive heart failure)    Severe tricuspid regurgitation      Hospital Course:   89 yo admitted with L2 compression fracture at home. Did well with TLSO and PT so conservative approach is planned. Will discharge to acute rehab. She will need to resume warfarin and follow INR there for A fib. Takes lasix intermittently for CHF.    The patient was admitted for the above noted medical/surgical issues. Please see daily progress note for further details concerning their stay. The patient improved throughout their stay and reached maximum medical improvement on the day of discharge. The patient/family agree with the treatment plan as outlined above. All questions concerning their stay were answered prior to discharge. They understand the importance of follow up concerning any abnormal test results.     Physical Exam      Discharge Medications:         Discharge Medications        Continue These Medications        Instructions Start Date   benzonatate 100 MG capsule  Commonly known as: TESSALON   100 mg, 3 Times Daily PRN      Calcium Carb-Cholecalciferol 600-200 MG-UNIT tablet   1 tablet, Daily      escitalopram 20 MG tablet  Commonly known as: LEXAPRO   1 tablet, Daily      fluticasone 50 MCG/ACT nasal spray  Commonly known as: FLONASE   2 sprays, Nasal, Daily PRN, prn       furosemide 20 MG tablet  Commonly known as: Lasix   20 mg, Oral, Daily PRN      lisinopril 10 MG tablet  Commonly known as: PRINIVIL,ZESTRIL   10 mg, Oral, Nightly      pantoprazole 40 MG EC tablet  Commonly known as: PROTONIX   40 mg, Every Morning      simvastatin 20 MG tablet  Commonly known as: ZOCOR   20 mg, Nightly      vitamin  B-12 1000 MCG tablet  Commonly known as: CYANOCOBALAMIN   1 tablet, Daily      Vitamin E 100 units tablet   1 tablet, Daily      warfarin 5 MG tablet  Commonly known as: COUMADIN   5 mg, Oral, Nightly, Take along with a 2.5 mg daily (except Saturday 5mg only)      warfarin 2.5 MG tablet  Commonly known as: COUMADIN   2.5 mg, Oral, 6 Times Weekly, Take along with a 5 mg daily except Saturday                Activity: up with TLSO    Diet: mechanical soft    Consults: neurosurgery    Significant Diagnostic Studies:    CT Lumbar Spine Without Contrast  Result Date: 7/21/2025   1. Acute appearing 2 column L2 compression fracture with 50% vertebral body height loss. 2. Old appearing L1 superior endplate compression fracture.   This report was signed and finalized on 7/21/2025 8:54 PM by John Henry.      CT Thoracic Spine Without Contrast  Result Date: 7/21/2025   1. No acute fracture or traumatic malalignment in the thoracic spine. 2. Old appearing mild superior endplate deformity of T12 with associated Schmorl's node. 3. Lumbar spine discussed separately.    This report was signed and finalized on 7/21/2025 8:48 PM by John Henry.      CT Cervical Spine Without Contrast  Result Date: 7/21/2025   No acute fracture or traumatic malalignment.    This report was signed and finalized on 7/21/2025 8:43 PM by John Henry.      CT Head Without Contrast  Result Date: 7/21/2025  Impression:   No acute intracranial abnormality.  This report was signed and finalized on 7/21/2025 8:37 PM by John Henry.      XR Elbow 3+ View Left  Result Date: 7/21/2025   No acute osseous finding.   This report was signed and finalized on 7/21/2025 7:52 PM by John Henry.      XR Shoulder 2+ View Right  Result Date: 7/21/2025   No acute osseous finding.   This report was signed and finalized on 7/21/2025 7:51 PM by John Henry.      XR Hips Bilateral With or Without Pelvis 5 View  Result Date: 7/21/2025   No acute osseous finding.    This report was signed and finalized on 7/21/2025 7:50 PM by John Henry.             Treatments:   pain control, PT    Disposition:   acute rehab in stable condition    24 minutes Time spent on discharge including discussion with patient/family, SW, and coordination of care.     Follow up with Elia Preston MD in 1 weeks.    Signed:  Elia Perston MD   7/23/2025, 12:45 CDT

## 2025-07-23 NOTE — THERAPY TREATMENT NOTE
Acute Care - Physical Therapy Treatment Note  [facility]     Patient Name: Yudi Westbrook  : 1932  MRN: [id number]  Today's Date: 2025   Onset of Illness/Injury or Date of Surgery: 25  Visit Dx:     ICD-10-CM ICD-9-CM   1. Impaired mobility [Z74.09]  Z74.09 799.89     Patient Active Problem List   Diagnosis    Atrial fibrillation    Bradycardia    Hypertension    Compression fracture of L1 vertebra with routine healing    BMI 22.0-22.9, adult    Hyperlipidemia    Dysphagia    Presence of cardiac pacemaker    Long term (current) use of anticoagulants    Pulmonary hypertension    Cellulitis of trunk    Nonrheumatic tricuspid valve regurgitation moderate 2021    Age related osteoporosis    At high risk for fracture    Chronic diastolic CHF (congestive heart failure)    L2 vertebral fracture    Severe tricuspid regurgitation     Past Medical History:   Diagnosis Date    Atrial fibrillation     Bradycardia     Cancer     Breast    Hyperlipidemia     Hypertension     Osteoporosis      Past Surgical History:   Procedure Laterality Date    APPENDECTOMY      CARDIAC ELECTROPHYSIOLOGY PROCEDURE N/A 11/10/2021    Procedure: Device Implant single chamber mri;  Surgeon: Charles Nunez MD;  Location: [facility] INVASIVE LOCATION;  Service: Cardiology;  Laterality: N/A;    ENDOSCOPY N/A 2021    Esophageal ring, medium size HH, Chronic active gastritis    HYSTERECTOMY      INSERT / REPLACE / REMOVE PACEMAKER      MASTECTOMY Bilateral      PT Assessment (Last 12 Hours)       PT Evaluation and Treatment       Row Name 25 0942          Physical Therapy Time and Intention    Subjective Information complains of;pain  -NW     Document Type therapy note (daily note)  -NW     Mode of Treatment physical therapy  -NW       Row Name 25 0942          General Information    Existing Precautions/Restrictions fall  -NW       Row Name 25 0942          Pain    Pretreatment Pain Rating 5/10  -NW      Posttreatment Pain Rating 7/10  -NW     Pain Location back  -NW     Pain Side/Orientation lower  -NW       Row Name 07/23/25 0942          Bed Mobility    Bed Mobility sit-sidelying  -NW     Sit-Sidelying [place] (Bed Mobility) verbal cues;contact guard  -NW     Comment, (Bed Mobility) needed help w/ LEs bb  -NW       Row Name 07/23/25 0942          Transfers    Transfers sit-stand transfer;stand-sit transfer  -NW       Row Name 07/23/25 0942          Sit-Stand Transfer    Sit-Stand [place] (Transfers) verbal cues;contact guard  -NW     Assistive Device (Sit-Stand Transfers) walker, front-wheeled  -NW       Row Name 07/23/25 0942          Stand-Sit Transfer    Stand-Sit [place] (Transfers) verbal cues;contact guard  -NW       Row Name 07/23/25 0942          Gait/Stairs (Locomotion)    [place] Level (Gait) verbal cues;contact guard  -NW     Assistive Device (Gait) walker, front-wheeled  -NW     Distance in Feet (Gait) 50  50x2  -NW     Deviations/Abnormal Patterns (Gait) phylicia decreased  -NW     Bilateral Gait Deviations heel strike decreased  -NW     Handrail Location (Stairs) both sides  -NW     Number of Steps (Stairs) 3  -NW     Ascending Technique (Stairs) step-over-step  -NW     Descending Technique (Stairs) step-over-step  -NW     Comment, (Gait/Stairs) pt fatiged very easily after going up/down stairs required standing rest breaks  -NW       Row Name 07/23/25 0942          Safety Issues/Impairments Affecting Functional Mobility    Impairments Affecting Function (Mobility) balance;pain  -NW       Row Name 07/23/25 0942          Motor Skills    Therapeutic Exercise aerobic  -NW       Row Name 07/23/25 0942          Aerobic Exercise    Time Performed (Aerobic Exercise) AROM BLEs  -NW       Row Name 07/23/25 0942          Positioning and Restraints    Pre-Treatment Position bathroom  -NW     Post Treatment Position bed  -NW     In Bed fowlers;call light within reach;encouraged to call for  assist;notified nsg  -NW               User Key  (r) = Recorded By, (t) = Taken By, (c) = Cosigned By      Initials Name Provider Type    Laura Jon, PTA Physical Therapist Assistant                    Physical Therapy Education       Title: PT OT SLP Therapies (Done)       Topic: Physical Therapy (Done)       Point: Mobility training (Done)       Learning Progress Summary            Patient Acceptance, E, VU by AP at 7/23/2025 0547    Acceptance, E, VU,NR by AJ at 7/22/2025 1612    Comment: role of PT, TLSO when OOB< spainl precautions, progressive mobility   Family Acceptance, E, VU,NR by AJ at 7/22/2025 1612    Comment: role of PT, TLSO when OOB< spainl precautions, progressive mobility                      Point: Home exercise program (Done)       Learning Progress Summary            Patient Acceptance, E, VU by AP at 7/23/2025 0547    Acceptance, E, VU,NR by AJ at 7/22/2025 1612    Comment: role of PT, TLSO when OOB< spainl precautions, progressive mobility   Family Acceptance, E, VU,NR by AJ at 7/22/2025 1612    Comment: role of PT, TLSO when OOB< spainl precautions, progressive mobility                      Point: Body mechanics (Done)       Learning Progress Summary            Patient Acceptance, E, VU by AP at 7/23/2025 0547    Acceptance, E, VU,NR by AJ at 7/22/2025 1612    Comment: role of PT, TLSO when OOB< spainl precautions, progressive mobility   Family Acceptance, E, VU,NR by AJ at 7/22/2025 1612    Comment: role of PT, TLSO when OOB< spainl precautions, progressive mobility                      Point: Precautions (Done)       Learning Progress Summary            Patient Acceptance, E, VU by AP at 7/23/2025 0547    Acceptance, E, VU,NR by AJ at 7/22/2025 1612    Comment: role of PT, TLSO when OOB< spainl precautions, progressive mobility   Family Acceptance, E, VU,NR by AJ at 7/22/2025 1612    Comment: role of PT, TLSO when OOB< spainl precautions, progressive mobility                                       User Key       Initials Effective Dates Name Provider Type Discipline    AP 06/06/25 -  Adelaida Ken, RN Registered Nurse Nurse     08/15/24 -  John Newsome, PT DPT Physical Therapist PT                  PT Recommendation and Plan             Time Calculation:         PT G-Codes  Outcome Measure Options: AM-PAC 6 Clicks Basic Mobility (PT)  AM-PAC 6 Clicks Score (PT): 18    Laura Dumont, PTA  7/23/2025

## 2025-07-23 NOTE — PLAN OF CARE
Goal Outcome Evaluation:   Pt A&Ox4, RA, VSS. Up x1 with TLSO brace in place and walker. Plan to DC to [facility] today, with son to transfer. Report called to Karis, receiving nurse.

## 2025-07-23 NOTE — PLAN OF CARE
Goal Outcome Evaluation:      Patient is alert and oriented X4. Vital signs stable. Up X1 with walker and TLSO to bathroom for BM. Voiding via purewick. UA ordered. Patient reports burning sensation when urinating. BP soft. IV in R AC, IID. Safety maintained.                                        patient, patient's family, nursing staff.

## 2025-07-23 NOTE — THERAPY EVALUATION
Acute Care - Occupational Therapy Initial Evaluation  [facility]     Patient Name: Yudi Westbrook  : 1932  MRN: [id number]  Today's Date: 2025  Onset of Illness/Injury or Date of Surgery: 25     Referring Physician: Dr. Preston    Admit Date: 2025       ICD-10-CM ICD-9-CM   1. Impaired mobility [Z74.09]  Z74.09 799.89     Patient Active Problem List   Diagnosis    Atrial fibrillation    Bradycardia    Hypertension    Compression fracture of L1 vertebra with routine healing    BMI 22.0-22.9, adult    Hyperlipidemia    Dysphagia    Presence of cardiac pacemaker    Long term (current) use of anticoagulants    Pulmonary hypertension    Cellulitis of trunk    Nonrheumatic tricuspid valve regurgitation moderate 2021    Age related osteoporosis    At high risk for fracture    Chronic diastolic CHF (congestive heart failure)    L2 vertebral fracture    Severe tricuspid regurgitation     Past Medical History:   Diagnosis Date    Atrial fibrillation     Bradycardia     Cancer     Breast    Hyperlipidemia     Hypertension     Osteoporosis      Past Surgical History:   Procedure Laterality Date    APPENDECTOMY      CARDIAC ELECTROPHYSIOLOGY PROCEDURE N/A 11/10/2021    Procedure: Device Implant single chamber mri;  Surgeon: Charles Nunez MD;  Location: [facility] INVASIVE LOCATION;  Service: Cardiology;  Laterality: N/A;    ENDOSCOPY N/A 2021    Esophageal ring, medium size HH, Chronic active gastritis    HYSTERECTOMY      INSERT / REPLACE / REMOVE PACEMAKER      MASTECTOMY Bilateral          OT ASSESSMENT FLOWSHEET (Last 12 Hours)       OT Evaluation and Treatment       Row Name 25 0939                   OT Time and Intention    Subjective Information complains of;pain  -AC        Document Type evaluation  -AC        Mode of Treatment occupational therapy  -AC           General Information    Patient Profile Reviewed yes  -AC        Onset of Illness/Injury or Date of Surgery  07/21/25  -        Referring Physician Dr. Preston  -        Prior Level of Function independent:;gait;transfer;bed mobility;ADL's;cooking;cleaning  typically stands for bathing  -        Equipment Currently Used at Home grab bar  -        Pertinent History of Current Functional Problem pt presents s/p fall at home Dx; L2 vertebral fx. Pt has h/o compression fx in spine and is hoping to treat conservatively with bracing  -        Existing Precautions/Restrictions spinal;TLSO;brace worn when out of bed;fall  -AC        Risks Reviewed LOB;increased discomfort;change in vital signs;lines disloged;patient:  -AC        Benefits Reviewed improve function;increase independence;increase strength;increase balance;decrease pain;decrease risk of DVT;improve skin integrity;increase knowledge;patient:  -AC        Barriers to Rehab hearing deficit  -           Living Environment    Current Living Arrangements home  tub shower with grab bars  -        People in Home sibling(s)  son lives 2 blocks away and helps as needed, pt is the caregiver for her 95 year old sister  -           Home Main Entrance    Number of Stairs, Main Entrance two  -AC        Stair Railings, Main Entrance railing on right side (ascending)  -AC           Stairs Within Home, Primary    Number of Stairs, Within Home, Primary none  -AC           Pain Assessment    Pretreatment Pain Rating 3/10  -AC        Posttreatment Pain Rating 5/10  -AC        Pain Location back  -AC        Pain Side/Orientation lower  -AC        Pain Management Interventions exercise or physical activity utilized;nursing notified  -        Response to Pain Interventions activity participation with tolerable pain  -AC           Cognition    Orientation Status (Cognition) oriented x 4  -AC        Follows Commands (Cognition) WFL  -AC        Personal Safety Interventions fall prevention program maintained;gait belt;muscle strengthening facilitated;nonskid shoes/slippers  when out of bed;supervised activity  -           Range of Motion Comprehensive    General Range of Motion bilateral upper extremity ROM WFL  -           Strength Comprehensive (MMT)    Comment, General Manual Muscle Testing (MMT) Assessment 4-/5 BUE  -           Sensory Assessment (Somatosensory)    Sensory Assessment (Somatosensory) sensation intact  -           Activities of Daily Living    BADL Assessment/Intervention lower body dressing;toileting;grooming;upper body dressing  -AC           Upper Body Dressing Assessment/Training    [place] Level (Upper Body Dressing) don;doff;maximum assist (25% patient effort)  -AC        Position (Upper Body Dressing) supported sitting  -AC        Comment, (Upper Body Dressing) TLSO  -           Lower Body Dressing Assessment/Training    [place] Level (Lower Body Dressing) don;doff;shoes/slippers;standby assist  -        Position (Lower Body Dressing) supported sitting  -AC           Grooming Assessment/Training    [place] Level (Grooming) wash face, hands;standby assist  -AC        Position (Grooming) sink side;supported standing  -           Toileting Assessment/Training    [place] Level (Toileting) toileting skills;perform perineal hygiene;standby assist  -        Assistive Devices (Toileting) commode  -        Position (Toileting) unsupported sitting  -           BADL Safety/Performance    Impairments, BADL Safety/Performance balance;pain;endurance/activity tolerance;strength  -           Bed Mobility    Bed Mobility --  -AC        Assistive Device (Bed Mobility) --  -        Comment, (Bed Mobility) up in chair  -           Functional Mobility    Functional Mobility- Ind. Level contact guard assist  -        Functional Mobility- Device walker, front-wheeled  -        Functional Mobility- Comment to BR, in room  -           Transfer Assessment/Treatment    Transfers sit-stand transfer;stand-sit transfer;shower  transfer;toilet transfer  -AC           Sit-Stand Transfer    Sit-Stand [place] (Transfers) contact guard;verbal cues  -AC        Assistive Device (Sit-Stand Transfers) walker, front-wheeled  -AC           Stand-Sit Transfer    Stand-Sit [place] (Transfers) contact guard;verbal cues  -AC        Assistive Device (Stand-Sit Transfers) walker, front-wheeled  -AC           Toilet Transfer    Type (Toilet Transfer) sit-stand;stand-sit  -AC        [place] Level (Toilet Transfer) minimum assist (75% patient effort);verbal cues  -AC        Assistive Device (Toilet Transfer) commode;grab bars/safety frame  -AC           Shower Transfer    Type (Shower Transfer) lateral  -AC        [place] Level (Shower Transfer) minimum assist (75% patient effort)  -AC        Assistive Device (Shower Transfer) grab bar, tub/shower  -AC        Comment, (Shower Transfer) simulated tub transfer; unable to fully clear LEs over side of tub  -AC           Safety Issues/Impairments Affecting Functional Mobility    Impairments Affecting Function (Mobility) balance;pain;range of motion (ROM);strength;endurance/activity tolerance  -AC           Balance    Balance Assessment sitting static balance;sitting dynamic balance;standing static balance;standing dynamic balance  -AC        Static Sitting Balance independent  -AC        Dynamic Sitting Balance supervision  -AC        Position, Sitting Balance sitting in chair  -AC        Static Standing Balance contact guard  -AC        Dynamic Standing Balance contact guard  -AC        Position/Device Used, Standing Balance walker, front-wheeled;supported  -AC           Plan of Care Review    Plan of Care Reviewed With patient  -AC        Progress no change  -AC        Outcome Evaluation OT eval completed.  Pt alert and oriented x4.  C/o 3/10 LBP, increased to 5/10 with activity.  Pt lives with 94 y/o sister and is typically independent with mobility, ADL and IADL including cooking/cleaning.   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  Her sister often has bowel incontinence and requires assist for hygiene that the patient is responsible for providing.  Pt demo full AROM in BUE and decreased strength at 4-/5.  Pt needed maxA to adjust TLSO but donned/doffed shoes with SBA. Pt stood with CGA and amb to BR with rw and CGA. Completed toileting, hygiene and hand washing with SBA.  Pt needed Jesus for commode transfer and also simulated tub shower transfer with Jesus, unable to fully clear LEs over tub.  OT is recommended to address pt's weakness, decreased endurance, pain and safety.  She does well wtih basic ADL but will need further training and education for walker mgmt and IADLs to function safely in her home environment.  Recommend IP rehab.  -AC           Positioning and Restraints    Pre-Treatment Position in bed  -AC        Post Treatment Position other  -AC        In Chair with PT  -AC           Therapy Assessment/Plan (OT)    OT Diagnosis decreased adl  -AC        Rehab Potential (OT) good  -AC        Criteria for Skilled Therapeutic Interventions Met (OT) yes;meets criteria;skilled treatment is necessary  -AC        Therapy Frequency (OT) 5 times/wk  -AC        Predicted Duration of Therapy Intervention (OT) 10 days  -AC        Activity Limitations Related to Problem List (OT) BADLs not performed adequately or safely  -AC        Planned Therapy Interventions (OT) activity tolerance training;BADL retraining;functional balance retraining;occupation/activity based interventions;patient/caregiver education/training;strengthening exercise;transfer/mobility retraining;orthotic fabrication/fitting/training  -AC           OT Goals    Transfer Goal Selection (OT) transfer, OT goal 1  -AC        Bathing Goal Selection (OT) bathing, OT goal 1  -AC        Dressing Goal Selection (OT) dressing, OT goal 1  -AC        Problem Specific Goal Selection (OT) problem specific goal 1, OT  -AC            Transfer Goal 1 (OT)    Activity/Assistive Device (Transfer Goal 1, OT) toilet;tub  -AC        [place] Level/Cues Needed (Transfer Goal 1, OT) modified independence  -AC        Time Frame (Transfer Goal 1, OT) long term goal (LTG);10 days  -AC        Progress/Outcome (Transfer Goal 1, OT) new goal  -AC           Bathing Goal 1 (OT)    Activity/Device (Bathing Goal 1, OT) bathing skills, all;grab bar, tub/shower  -AC        [place] Level/Cues Needed (Bathing Goal 1, OT) standby assist  -AC        Time Frame (Bathing Goal 1, OT) long term goal (LTG);10 days  -AC        Strategies/Barriers (Bathing Goal 1, OT) standing  -AC        Progress/Outcomes (Bathing Goal 1, OT) new goal  -AC           Dressing Goal 1 (OT)    Activity/Device (Dressing Goal 1, OT) lower body dressing;upper body dressing  -AC        [place]/Cues Needed (Dressing Goal 1, OT) independent  -AC        Time Frame (Dressing Goal 1, OT) long term goal (LTG);10 days  -AC        Progress/Outcome (Dressing Goal 1, OT) new goal  -AC           Problem Specific Goal 1 (OT)    Problem Specific Goal 1 (OT) Pt will independently implement one pain management technique to decrease pain and improve functional performance.  -AC        Time Frame (Problem Specific Goal 1, OT) long term goal (LTG);10 days  -AC        Progress/Outcome (Problem Specific Goal 1, OT) new goal  -AC                  User Key  (r) = Recorded By, (t) = Taken By, (c) = Cosigned By      Initials Name Effective Dates    August Villanueva, OTR/L, PRAVEEN 02/03/23 -                            OT Recommendation and Plan  Planned Therapy Interventions (OT): activity tolerance training, BADL retraining, functional balance retraining, occupation/activity based interventions, patient/caregiver education/training, strengthening exercise, transfer/mobility retraining, orthotic fabrication/fitting/training  Therapy Frequency (OT): 5 times/wk  Plan of Care Review  Plan of Care Reviewed  With: patient  Progress: no change  Outcome Evaluation: OT eval completed.  Pt alert and oriented x4.  C/o 3/10 LBP, increased to 5/10 with activity.  Pt lives with 94 y/o sister and is typically independent with mobility, ADL and IADL including cooking/cleaning.  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  Her sister often has bowel incontinence and requires assist for hygiene that the patient is responsible for providing.  Pt demo full AROM in BUE and decreased strength at 4-/5.  Pt needed maxA to adjust TLSO but donned/doffed shoes with SBA. Pt stood with CGA and amb to BR with rw and CGA. Completed toileting, hygiene and hand washing with SBA.  Pt needed Jesus for commode transfer and also simulated tub shower transfer with Jesus, unable to fully clear LEs over tub.  OT is recommended to address pt's weakness, decreased endurance, pain and safety.  She does well wtih basic ADL but will need further training and education for walker mgmt and IADLs to function safely in her home environment.  Recommend IP rehab.  Plan of Care Reviewed With: patient  Outcome Evaluation: OT eval completed.  Pt alert and oriented x4.  C/o 3/10 LBP, increased to 5/10 with activity.  Pt lives with 94 y/o sister and is typically independent with mobility, ADL and IADL including cooking/cleaning.  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  Her sister often has bowel incontinence and requires assist for hygiene that the patient is responsible for providing.  Pt demo full AROM in BUE and decreased strength at 4-/5.  Pt needed maxA to adjust TLSO but donned/doffed shoes with SBA. Pt stood with CGA and amb to BR with rw and CGA. Completed toileting, hygiene and hand washing with SBA.  Pt needed Jesus for commode transfer and also simulated tub shower transfer with Jesus, unable to fully clear LEs over tub.  OT is recommended to address pt's weakness, decreased endurance, pain and safety.  She  does well wtih basic ADL but will need further training and education for walker mgmt and IADLs to function safely in her home environment.  Recommend IP rehab.     Outcome Measures       Row Name 07/23/25 0939             How much help from another is currently needed...    Putting on and taking off regular lower body clothing? 3  -AC      Bathing (including washing, rinsing, and drying) 3  -AC      Toileting (which includes using toilet bed pan or urinal) 4  -AC      Putting on and taking off regular upper body clothing 2  -AC      Taking care of personal grooming (such as brushing teeth) 4  -AC      Eating meals 4  -AC      AM-PAC 6 Clicks Score (OT) 20  -AC         Functional Assessment    Outcome Measure Options AM-PAC 6 Clicks Daily Activity (OT)  -AC                User Key  (r) = Recorded By, (t) = Taken By, (c) = Cosigned By      Initials Name Provider Type    August Villanueva OTR/L, PRAVEEN Occupational Therapist                    Time Calculation:    Time Calculation- OT       Row Name 07/23/25 1034 07/23/25 1027          Time Calculation- OT    OT Start Time 0939  - --     OT Stop Time 1020  -AC --     OT Time Calculation (min) 41 min  -AC --     OT Received On 07/23/25  - --     OT Goal Re-Cert Due Date 08/02/25  - --        Timed Charges    42425 - Gait Training Minutes  -- 19  -NW        Total Minutes    Timed Charges Total Minutes -- 19  -NW      Total Minutes -- 19  -NW               User Key  (r) = Recorded By, (t) = Taken By, (c) = Cosigned By      Initials Name Provider Type    August Villanueva OTR/L, CNT Occupational Therapist    NW Laura Dumont, PTA Physical Therapist Assistant                  Therapy Charges for Today       Code Description Service Date Service Provider Modifiers Qty    [id number] HC OT EVAL MOD COMPLEXITY 4 7/23/2025 August Hillman OTR/L, CNT GO 1                 August N. Rafy, OTR/L, CNT  7/23/2025

## 2025-07-23 NOTE — PROGRESS NOTES
Daily Progress Note  Yudi Westbrook  MRN: [id number] LOS: 2    Admit Date: 7/21/2025 7/23/2025 07:46 CDT    Subjective:          Chief Complaint:  Chief Complaint   Patient presents with    Fall       Interval History:    Reviewed overnight events and nursing notes.   Patient did well yesterday up with TLSO brace.  No need for kyphoplasty at this time.  She is agreeable to go to rehab.  PT recommends acute rehab versus SNF    Review of Systems   Constitutional:  Negative for chills and fever.   Cardiovascular:  Negative for chest pain.   Gastrointestinal:  Negative for constipation and diarrhea.   Genitourinary:  Negative for dysuria.   Musculoskeletal:  Positive for back pain.       DIET:  Diet: Cardiac; Healthy Heart (2-3 Na+); Texture: Soft to Chew (NDD 3); Soft to Chew: Chopped Meat; Fluid Consistency: Thin (IDDSI 0)    Medications:      atorvastatin, 20 mg, Oral, Daily  escitalopram, 20 mg, Oral, Daily  lisinopril, 10 mg, Oral, Nightly  pantoprazole, 40 mg, Oral, QAM        Data:     Code Status:   Code Status and Medical Interventions: No CPR (Do Not Attempt to Resuscitate); Limited Support; No intubation (DNI)   Ordered at: 07/22/25 0637     Code Status (Patient has no pulse and is not breathing):    No CPR (Do Not Attempt to Resuscitate)     Medical Interventions (Patient has pulse or is breathing):    Limited Support     Medical Intervention Limits:    No intubation (DNI)       Family History   Problem Relation Age of Onset    Hypertension Father     Heart attack Brother     Cancer Brother     Colon cancer Neg Hx     Colon polyps Neg Hx      Social History     Socioeconomic History    Marital status:    Tobacco Use    Smoking status: Never     Passive exposure: Never    Smokeless tobacco: Never   Vaping Use    Vaping status: Never Used   Substance and Sexual Activity    Alcohol use: No    Drug use: No    Sexual activity: Defer       Labs:    Lab Results (last 72 hours)       Procedure Component  Value Units Date/Time    Comprehensive Metabolic Panel [[id number]]  (Abnormal) Collected: 07/22/25 0445    Specimen: Blood Updated: 07/22/25 0523     Glucose 112 mg/dL      BUN 16.0 mg/dL      Creatinine 0.55 mg/dL      Sodium 135 mmol/L      Potassium 4.4 mmol/L      Chloride 101 mmol/L      CO2 24.0 mmol/L      Calcium 9.7 mg/dL      Total Protein 6.5 g/dL      Albumin 4.0 g/dL      ALT (SGPT) 17 U/L      AST (SGOT) 21 U/L      Alkaline Phosphatase 64 U/L      Total Bilirubin 1.0 mg/dL      Globulin 2.5 gm/dL      A/G Ratio 1.6 g/dL      BUN/Creatinine Ratio 29.1     Anion Gap 10.0 mmol/L      eGFR 85.6 mL/min/1.73     Narrative:      GFR Categories in Chronic Kidney Disease (CKD)              GFR Category          GFR (mL/min/1.73)    Interpretation  G1                    90 or greater        Normal or high (1)  G2                    60-89                Mild decrease (1)  G3a                   45-59                Mild to moderate decrease  G3b                   30-44                Moderate to severe decrease  G4                    15-29                Severe decrease  G5                    14 or less           Kidney failure    (1)In the absence of evidence of kidney disease, neither GFR category G1 or G2 fulfill the criteria for CKD.    eGFR calculation 2021 CKD-EPI creatinine equation, which does not include race as a factor    CBC Auto Differential [[id number]]  (Abnormal) Collected: 07/22/25 0445    Specimen: Blood Updated: 07/22/25 0456     WBC 6.94 10*3/mm3      RBC 3.84 10*6/mm3      Hemoglobin 11.7 g/dL      Hematocrit 37.6 %      MCV 97.9 fL      MCH 30.5 pg      MCHC 31.1 g/dL      RDW 13.7 %      RDW-SD 49.6 fl      MPV 10.2 fL      Platelets 183 10*3/mm3      Neutrophil % 75.8 %      Lymphocyte % 13.0 %      Monocyte % 7.8 %      Eosinophil % 1.9 %      Basophil % 0.9 %      Immature Grans % 0.6 %      Neutrophils, Absolute 5.27 10*3/mm3      Lymphocytes, Absolute 0.90 10*3/mm3      Monocytes,  Absolute 0.54 10*3/mm3      Eosinophils, Absolute 0.13 10*3/mm3      Basophils, Absolute 0.06 10*3/mm3      Immature Grans, Absolute 0.04 10*3/mm3      nRBC 0.0 /100 WBC     Comprehensive Metabolic Panel [[id number]]  (Abnormal) Collected: 07/21/25 1954    Specimen: Blood Updated: 07/21/25 2028     Glucose 127 mg/dL      BUN 17.0 mg/dL      Creatinine 0.63 mg/dL      Sodium 137 mmol/L      Potassium 4.2 mmol/L      Chloride 101 mmol/L      CO2 23.0 mmol/L      Calcium 10.0 mg/dL      Total Protein 7.2 g/dL      Albumin 4.4 g/dL      ALT (SGPT) 20 U/L      AST (SGOT) 25 U/L      Alkaline Phosphatase 71 U/L      Total Bilirubin 0.7 mg/dL      Globulin 2.8 gm/dL      A/G Ratio 1.6 g/dL      BUN/Creatinine Ratio 27.0     Anion Gap 13.0 mmol/L      eGFR 82.8 mL/min/1.73     Narrative:      GFR Categories in Chronic Kidney Disease (CKD)              GFR Category          GFR (mL/min/1.73)    Interpretation  G1                    90 or greater        Normal or high (1)  G2                    60-89                Mild decrease (1)  G3a                   45-59                Mild to moderate decrease  G3b                   30-44                Moderate to severe decrease  G4                    15-29                Severe decrease  G5                    14 or less           Kidney failure    (1)In the absence of evidence of kidney disease, neither GFR category G1 or G2 fulfill the criteria for CKD.    eGFR calculation 2021 CKD-EPI creatinine equation, which does not include race as a factor    aPTT [[id number]]  (Abnormal) Collected: 07/21/25 1954    Specimen: Blood Updated: 07/21/25 2016     PTT 36.8 seconds     Narrative:      PTT = The equivalent PTT values for the therapeutic range of heparin levels at 0.3 to 0.7 U/ml are 77 - 99 seconds.    Protime-INR [[id number]]  (Abnormal) Collected: 07/21/25 1954    Specimen: Blood Updated: 07/21/25 2016     Protime 26.3 Seconds      INR 2.26    CBC & Differential [[id number]]   "(Abnormal) Collected: 25    Specimen: Blood Updated: 25    Narrative:      The following orders were created for panel order CBC & Differential.  Procedure                               Abnormality         Status                     ---------                               -----------         ------                     CBC Auto Differential[[id number]]        Abnormal            Final result                 Please view results for these tests on the individual orders.    CBC Auto Differential [[id number]]  (Abnormal) Collected: 25    Specimen: Blood Updated: 25     WBC 9.28 10*3/mm3      RBC 3.95 10*6/mm3      Hemoglobin 11.8 g/dL      Hematocrit 37.5 %      MCV 94.9 fL      MCH 29.9 pg      MCHC 31.5 g/dL      RDW 13.6 %      RDW-SD 47.7 fl      MPV 9.7 fL      Platelets 215 10*3/mm3      Neutrophil % 80.6 %      Lymphocyte % 10.9 %      Monocyte % 6.5 %      Eosinophil % 0.6 %      Basophil % 0.4 %      Immature Grans % 1.0 %      Neutrophils, Absolute 7.48 10*3/mm3      Lymphocytes, Absolute 1.01 10*3/mm3      Monocytes, Absolute 0.60 10*3/mm3      Eosinophils, Absolute 0.06 10*3/mm3      Basophils, Absolute 0.04 10*3/mm3      Immature Grans, Absolute 0.09 10*3/mm3      nRBC 0.0 /100 WBC               Objective:     Vitals: /49 (BP Location: Left arm, Patient Position: Lying)   Pulse 60   Temp 98 °F (36.7 °C) (Oral)   Resp 16   Ht 165.1 cm (65\")   Wt 64 kg (141 lb 3.2 oz)   SpO2 95%   BMI 23.50 kg/m²    Intake/Output Summary (Last 24 hours) at 2025 0746  Last data filed at 2025 0441  Gross per 24 hour   Intake 360 ml   Output 1000 ml   Net -640 ml    Temp (24hrs), Av.4 °F (36.9 °C), Min:98 °F (36.7 °C), Max:98.9 °F (37.2 °C)      Physical Exam  Constitutional:       Appearance: She is well-developed.      Comments: Advanced age, frail   HENT:      Head: Normocephalic and atraumatic.   Eyes:      Conjunctiva/sclera: Conjunctivae normal.      " Pupils: Pupils are equal, round, and reactive to light.   Cardiovascular:      Rate and Rhythm: Normal rate and regular rhythm.      Heart sounds: Normal heart sounds. No murmur heard.     No friction rub.   Pulmonary:      Effort: Pulmonary effort is normal. No respiratory distress.      Breath sounds: Normal breath sounds. No wheezing or rales.   Abdominal:      General: Bowel sounds are normal. There is no distension.      Palpations: Abdomen is soft.      Tenderness: There is no abdominal tenderness.   Musculoskeletal:         General: Normal range of motion.      Cervical back: Normal range of motion.   Skin:     Capillary Refill: Capillary refill takes less than 2 seconds.   Neurological:      Mental Status: She is alert and oriented to person, place, and time.      Cranial Nerves: No cranial nerve deficit.   Psychiatric:         Behavior: Behavior normal.             Assessment and Plan:     Primary Problem:  L2 vertebral fracture    Hospital Problem list:    L2 vertebral fracture    Atrial fibrillation    Age related osteoporosis    Chronic diastolic CHF (congestive heart failure)    Severe tricuspid regurgitation      PMH:  Past Medical History:   Diagnosis Date    Atrial fibrillation     Bradycardia     Cancer 2002    Breast    Hyperlipidemia     Hypertension     Osteoporosis        Treatment Plan:  L2 compression fracture  Old L1 compression fracture now with new L2 compression fracture.  Previously she was managed conservatively with TLSO and she is interested in trying this again.  Son has already brought her TLSO to the hospital.  Neurosurgery consulted.  She did well yesterday getting up with TLSO but will need some rehab.  No indication for kyphoplasty at this time.    Atrial fibrillation  Hold Coumadin in the setting of possible surgical intervention.  Has pacemaker in place.     Chronic diastolic CHF  Volume status stable now and no signs of shortness of breath.  Will monitor and may need Lasix  intermittently.    Disposition: Acute rehab versus SNF.  Patient prefers [facility] if able.  She is medically stable for discharge there is soon as bed available.    Reviewed treatment plans with the patient and/or family.     Electronically signed by Elia Preston MD on 7/23/2025 at 07:46 CDT

## 2025-07-24 PROBLEM — E43 SEVERE MALNUTRITION: Status: ACTIVE | Noted: 2025-07-24

## 2025-07-24 PROBLEM — S32.000A LUMBAR COMPRESSION FRACTURE, CLOSED, INITIAL ENCOUNTER (HCC): Status: ACTIVE | Noted: 2025-07-24

## 2025-07-24 LAB
ALBUMIN SERPL-MCNC: 3.6 G/DL (ref 3.5–5.2)
ALP SERPL-CCNC: 60 U/L (ref 35–104)
ALT SERPL-CCNC: 11 U/L (ref 10–35)
ANION GAP SERPL CALCULATED.3IONS-SCNC: 9 MMOL/L (ref 8–16)
AST SERPL-CCNC: 15 U/L (ref 10–35)
BASOPHILS # BLD: 0.1 K/UL (ref 0–0.2)
BASOPHILS NFR BLD: 0.8 % (ref 0–1)
BILIRUB SERPL-MCNC: 1.6 MG/DL (ref 0.2–1.2)
BUN SERPL-MCNC: 14 MG/DL (ref 8–23)
CALCIUM SERPL-MCNC: 9.5 MG/DL (ref 8.2–9.6)
CHLORIDE SERPL-SCNC: 101 MMOL/L (ref 98–107)
CO2 SERPL-SCNC: 24 MMOL/L (ref 22–29)
CREAT SERPL-MCNC: 0.5 MG/DL (ref 0.5–0.9)
EOSINOPHIL # BLD: 0.3 K/UL (ref 0–0.6)
EOSINOPHIL NFR BLD: 3.7 % (ref 0–5)
ERYTHROCYTE [DISTWIDTH] IN BLOOD BY AUTOMATED COUNT: 13.3 % (ref 11.5–14.5)
GLUCOSE SERPL-MCNC: 104 MG/DL (ref 70–99)
HCT VFR BLD AUTO: 32 % (ref 37–47)
HGB BLD-MCNC: 10.4 G/DL (ref 12–16)
IMM GRANULOCYTES # BLD: 0 K/UL
INR PPP: 1.66 (ref 0.88–1.18)
LYMPHOCYTES # BLD: 1.1 K/UL (ref 1.1–4.5)
LYMPHOCYTES NFR BLD: 15.7 % (ref 20–40)
MCH RBC QN AUTO: 30.8 PG (ref 27–31)
MCHC RBC AUTO-ENTMCNC: 32.5 G/DL (ref 33–37)
MCV RBC AUTO: 94.7 FL (ref 81–99)
MONOCYTES # BLD: 0.7 K/UL (ref 0–0.9)
MONOCYTES NFR BLD: 9.9 % (ref 0–10)
NEUTROPHILS # BLD: 4.9 K/UL (ref 1.5–7.5)
NEUTS SEG NFR BLD: 69.3 % (ref 50–65)
PLATELET # BLD AUTO: 179 K/UL (ref 130–400)
PMV BLD AUTO: 10.6 FL (ref 9.4–12.3)
POTASSIUM SERPL-SCNC: 4.1 MMOL/L (ref 3.5–5)
PROT SERPL-MCNC: 5.9 G/DL (ref 6.4–8.3)
PROTHROMBIN TIME: 19.4 SEC (ref 12–14.6)
RBC # BLD AUTO: 3.38 M/UL (ref 4.2–5.4)
SODIUM SERPL-SCNC: 134 MMOL/L (ref 136–145)
WBC # BLD AUTO: 7.1 K/UL (ref 4.8–10.8)

## 2025-07-24 PROCEDURE — 2700000000 HC OXYGEN THERAPY PER DAY

## 2025-07-24 PROCEDURE — 94760 N-INVAS EAR/PLS OXIMETRY 1: CPT

## 2025-07-24 PROCEDURE — 36415 COLL VENOUS BLD VENIPUNCTURE: CPT

## 2025-07-24 PROCEDURE — 99223 1ST HOSP IP/OBS HIGH 75: CPT | Performed by: STUDENT IN AN ORGANIZED HEALTH CARE EDUCATION/TRAINING PROGRAM

## 2025-07-24 PROCEDURE — 97161 PT EVAL LOW COMPLEX 20 MIN: CPT

## 2025-07-24 PROCEDURE — 80053 COMPREHEN METABOLIC PANEL: CPT

## 2025-07-24 PROCEDURE — 97535 SELF CARE MNGMENT TRAINING: CPT

## 2025-07-24 PROCEDURE — 6360000002 HC RX W HCPCS: Performed by: STUDENT IN AN ORGANIZED HEALTH CARE EDUCATION/TRAINING PROGRAM

## 2025-07-24 PROCEDURE — 97165 OT EVAL LOW COMPLEX 30 MIN: CPT

## 2025-07-24 PROCEDURE — 1180000000 HC REHAB R&B

## 2025-07-24 PROCEDURE — 85610 PROTHROMBIN TIME: CPT

## 2025-07-24 PROCEDURE — 85025 COMPLETE CBC W/AUTO DIFF WBC: CPT

## 2025-07-24 PROCEDURE — 97530 THERAPEUTIC ACTIVITIES: CPT

## 2025-07-24 PROCEDURE — 6370000000 HC RX 637 (ALT 250 FOR IP): Performed by: STUDENT IN AN ORGANIZED HEALTH CARE EDUCATION/TRAINING PROGRAM

## 2025-07-24 PROCEDURE — 97110 THERAPEUTIC EXERCISES: CPT

## 2025-07-24 PROCEDURE — 97116 GAIT TRAINING THERAPY: CPT

## 2025-07-24 RX ORDER — FLUTICASONE PROPIONATE 50 MCG
2 SPRAY, SUSPENSION (ML) NASAL DAILY PRN
Status: DISCONTINUED | OUTPATIENT
Start: 2025-07-24 | End: 2025-07-24

## 2025-07-24 RX ORDER — LISINOPRIL 10 MG/1
10 TABLET ORAL NIGHTLY
Status: DISCONTINUED | OUTPATIENT
Start: 2025-07-24 | End: 2025-08-01 | Stop reason: HOSPADM

## 2025-07-24 RX ORDER — ACETAMINOPHEN 500 MG
500 TABLET ORAL EVERY 4 HOURS PRN
Status: DISCONTINUED | OUTPATIENT
Start: 2025-07-24 | End: 2025-08-01 | Stop reason: HOSPADM

## 2025-07-24 RX ORDER — PANTOPRAZOLE SODIUM 40 MG/1
40 TABLET, DELAYED RELEASE ORAL
Status: DISCONTINUED | OUTPATIENT
Start: 2025-07-24 | End: 2025-08-01 | Stop reason: HOSPADM

## 2025-07-24 RX ORDER — ACETAMINOPHEN 325 MG/1
650 TABLET ORAL EVERY 4 HOURS PRN
Status: DISCONTINUED | OUTPATIENT
Start: 2025-07-24 | End: 2025-07-24

## 2025-07-24 RX ORDER — FLUTICASONE PROPIONATE 50 MCG
2 SPRAY, SUSPENSION (ML) NASAL 2 TIMES DAILY PRN
Status: DISCONTINUED | OUTPATIENT
Start: 2025-07-24 | End: 2025-08-01 | Stop reason: HOSPADM

## 2025-07-24 RX ORDER — FUROSEMIDE 20 MG/1
20 TABLET ORAL DAILY
Status: DISCONTINUED | OUTPATIENT
Start: 2025-07-24 | End: 2025-07-24

## 2025-07-24 RX ORDER — BENZONATATE 100 MG/1
100 CAPSULE ORAL 3 TIMES DAILY PRN
Status: DISCONTINUED | OUTPATIENT
Start: 2025-07-24 | End: 2025-08-01 | Stop reason: HOSPADM

## 2025-07-24 RX ORDER — HYDROCODONE BITARTRATE AND ACETAMINOPHEN 7.5; 325 MG/1; MG/1
1 TABLET ORAL EVERY 4 HOURS PRN
Refills: 0 | Status: DISCONTINUED | OUTPATIENT
Start: 2025-07-24 | End: 2025-08-01 | Stop reason: HOSPADM

## 2025-07-24 RX ORDER — ENOXAPARIN SODIUM 100 MG/ML
40 INJECTION SUBCUTANEOUS DAILY
Status: DISCONTINUED | OUTPATIENT
Start: 2025-07-24 | End: 2025-07-24

## 2025-07-24 RX ORDER — EVENING PRIMROSE OIL 500 MG
100 CAPSULE ORAL DAILY
Status: DISCONTINUED | OUTPATIENT
Start: 2025-07-24 | End: 2025-08-01 | Stop reason: HOSPADM

## 2025-07-24 RX ORDER — HYDROCODONE BITARTRATE AND ACETAMINOPHEN 5; 325 MG/1; MG/1
1 TABLET ORAL EVERY 4 HOURS PRN
Status: DISCONTINUED | OUTPATIENT
Start: 2025-07-24 | End: 2025-07-24

## 2025-07-24 RX ORDER — LIDOCAINE 4 G/G
1 PATCH TOPICAL DAILY
Status: DISCONTINUED | OUTPATIENT
Start: 2025-07-24 | End: 2025-08-01 | Stop reason: HOSPADM

## 2025-07-24 RX ORDER — HYDROCODONE BITARTRATE AND ACETAMINOPHEN 5; 325 MG/1; MG/1
1 TABLET ORAL EVERY 4 HOURS PRN
Status: DISCONTINUED | OUTPATIENT
Start: 2025-07-24 | End: 2025-08-01 | Stop reason: HOSPADM

## 2025-07-24 RX ORDER — ATORVASTATIN CALCIUM 20 MG/1
10 TABLET, FILM COATED ORAL DAILY
Status: DISCONTINUED | OUTPATIENT
Start: 2025-07-24 | End: 2025-08-01 | Stop reason: HOSPADM

## 2025-07-24 RX ORDER — ESCITALOPRAM OXALATE 10 MG/1
20 TABLET ORAL DAILY
Status: DISCONTINUED | OUTPATIENT
Start: 2025-07-24 | End: 2025-08-01 | Stop reason: HOSPADM

## 2025-07-24 RX ORDER — FUROSEMIDE 20 MG/1
20 TABLET ORAL
Status: DISCONTINUED | OUTPATIENT
Start: 2025-07-25 | End: 2025-07-30

## 2025-07-24 RX ORDER — POLYETHYLENE GLYCOL 3350 17 G/17G
17 POWDER, FOR SOLUTION ORAL DAILY PRN
Status: DISCONTINUED | OUTPATIENT
Start: 2025-07-24 | End: 2025-07-26

## 2025-07-24 RX ORDER — MULTIVITAMIN WITH IRON
1000 TABLET ORAL DAILY
Status: DISCONTINUED | OUTPATIENT
Start: 2025-07-24 | End: 2025-08-01 | Stop reason: HOSPADM

## 2025-07-24 RX ADMIN — WARFARIN SODIUM 7.5 MG: 2.5 TABLET ORAL at 17:01

## 2025-07-24 RX ADMIN — ESCITALOPRAM OXALATE 20 MG: 10 TABLET ORAL at 08:32

## 2025-07-24 RX ADMIN — Medication 2 SPRAY: at 20:27

## 2025-07-24 RX ADMIN — CYANOCOBALAMIN TAB 500 MCG 1000 MCG: 500 TAB at 08:32

## 2025-07-24 RX ADMIN — HYDROCODONE BITARTRATE AND ACETAMINOPHEN 1 TABLET: 5; 325 TABLET ORAL at 03:12

## 2025-07-24 RX ADMIN — ENOXAPARIN SODIUM 40 MG: 100 INJECTION SUBCUTANEOUS at 08:34

## 2025-07-24 RX ADMIN — Medication 100 UNITS: at 08:32

## 2025-07-24 RX ADMIN — LISINOPRIL 10 MG: 10 TABLET ORAL at 20:22

## 2025-07-24 RX ADMIN — ATORVASTATIN CALCIUM 10 MG: 20 TABLET, FILM COATED ORAL at 08:32

## 2025-07-24 RX ADMIN — PANTOPRAZOLE SODIUM 40 MG: 40 TABLET, DELAYED RELEASE ORAL at 08:32

## 2025-07-24 RX ADMIN — HYDROCODONE BITARTRATE AND ACETAMINOPHEN 1 TABLET: 7.5; 325 TABLET ORAL at 14:47

## 2025-07-24 RX ADMIN — FLUTICASONE PROPIONATE 2 SPRAY: 50 SPRAY, METERED NASAL at 10:28

## 2025-07-24 RX ADMIN — HYDROCODONE BITARTRATE AND ACETAMINOPHEN 1 TABLET: 7.5; 325 TABLET ORAL at 20:22

## 2025-07-24 ASSESSMENT — PAIN DESCRIPTION - ORIENTATION
ORIENTATION: MID
ORIENTATION: RIGHT
ORIENTATION: MID

## 2025-07-24 ASSESSMENT — PAIN DESCRIPTION - LOCATION
LOCATION: BACK
LOCATION: LEG
LOCATION: BACK

## 2025-07-24 ASSESSMENT — PAIN SCALES - GENERAL
PAINLEVEL_OUTOF10: 5
PAINLEVEL_OUTOF10: 6
PAINLEVEL_OUTOF10: 5
PAINLEVEL_OUTOF10: 10
PAINLEVEL_OUTOF10: 0
PAINLEVEL_OUTOF10: 1

## 2025-07-24 ASSESSMENT — PAIN DESCRIPTION - DESCRIPTORS
DESCRIPTORS: ACHING

## 2025-07-24 ASSESSMENT — PAIN SCALES - WONG BAKER: WONGBAKER_NUMERICALRESPONSE: NO HURT

## 2025-07-24 ASSESSMENT — PAIN - FUNCTIONAL ASSESSMENT
PAIN_FUNCTIONAL_ASSESSMENT: ACTIVITIES ARE NOT PREVENTED
PAIN_FUNCTIONAL_ASSESSMENT: ACTIVITIES ARE NOT PREVENTED

## 2025-07-24 ASSESSMENT — PAIN DESCRIPTION - FREQUENCY: FREQUENCY: CONTINUOUS

## 2025-07-24 ASSESSMENT — PAIN DESCRIPTION - PAIN TYPE: TYPE: ACUTE PAIN

## 2025-07-24 ASSESSMENT — PAIN DESCRIPTION - ONSET: ONSET: ON-GOING

## 2025-07-24 NOTE — H&P
[facility]  History and Physical        Patient:   Corinne Nelson  MR#:    [id number]  Account Number:                   [id number]      Room:    [address], [place] [address]-   YOB: 1932  Date of Progress Note: 7/24/2025  Time of Note                           9:56 AM        Admitting diagnosis: L2 compression fracture    Secondary diagnoses: Paroxysmal atrial fibrillation  Age-related osteoporosis  Chronic diastolic heart failure  Severe tricuspid regurgitation  Hypertension  Hyperlipidemia  GERD    CHIEF COMPLAINT:   Back pain      HISTORY OF PRESENT ILLNESS:   89 y.o. female who presents with fall at home on 7/21/2025.  She was just admitted with a diastolic CHF exacerbation the previous week with a mechanical fall at home on day of admission, 7/16. 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  She said she  was turning while holding a pot of boiling potatoes and lost her balance. She slid backwards and  hit her head on a table behind her, but mostly fell on her buttocks. She had immediat acute back pain. She  called her son who came and helped her get up and get to the ER.  No new neurologic deficits.  CT of lumbar spine showed acute appearing 2 column L2 compression fracture with 50% vertebral body height loss. Old appearing L1 superior endplate compression fracture. Neuro surgery was consulted and decision was made to treat conservatively, but ordered bracing when OOB. She had a TLSO brace from home from a previous L1 fx. Her son brought it for her to use now.   She normally does not use O2 at home, but since being in the hospital, her sats drop in the 80's without having 2-3L of O2 on.   She is now  medically stable and is participating with therapy. She is ready to begin rehab with goal of returning home after rehab dc with support from her son.      Seen and examined this a.m.    had back pain intermittently overnight and some this a.m. able to

## 2025-07-24 NOTE — THERAPY DISCHARGE NOTE
Acute Care - Physical Therapy Discharge Summary  [facility]       Patient Name: Yudi Westbrook  : 1932  MRN: [id number]    Today's Date: 2025  Onset of Illness/Injury or Date of Surgery: 25       Referring Physician: Dr. Preston      Admit Date: 2025      PT Recommendation and Plan    Visit Dx:    ICD-10-CM ICD-9-CM   1. Impaired mobility [Z74.09]  Z74.09 799.89        Outcome Measures       Row Name 25 0939             How much help from another is currently needed...    Putting on and taking off regular lower body clothing? 3  -AC      Bathing (including washing, rinsing, and drying) 3  -AC      Toileting (which includes using toilet bed pan or urinal) 4  -AC      Putting on and taking off regular upper body clothing 2  -AC      Taking care of personal grooming (such as brushing teeth) 4  -AC      Eating meals 4  -AC      AM-PAC 6 Clicks Score (OT) 20  -AC         Functional Assessment    Outcome Measure Options AM-PAC 6 Clicks Daily Activity (OT)  -AC                User Key  (r) = Recorded By, (t) = Taken By, (c) = Cosigned By      Initials Name Provider Type    AC August Hillman, OTR/L, CNT Occupational Therapist                         PT Rehab Goals       Row Name 25 1223             Bed Mobility Goal 1 (PT)    Activity/Assistive Device (Bed Mobility Goal 1, PT) bed mobility activities, all  -AH      [place] Level/Cues Needed (Bed Mobility Goal 1, PT) standby assist  -      Time Frame (Bed Mobility Goal 1, PT) long term goal (LTG);10 days  -      Strategies/Barriers (Bed Mobility Goal 1, PT) maintain spinal precautions  -AH      Progress/Outcomes (Bed Mobility Goal 1, PT) goal not met  -AH         Transfer Goal 1 (PT)    Activity/Assistive Device (Transfer Goal 1, PT) sit-to-stand/stand-to-sit;bed-to-chair/chair-to-bed;toilet;car transfer  -      [place] Level/Cues Needed (Transfer Goal 1, PT) independent  -      Time Frame (Transfer Goal 1, PT)  long term goal (LTG);10 days  -      Strategies/Barriers (Transfers Goal 1, PT) maintain spinal precautions  -      Progress/Outcome (Transfer Goal 1, PT) goal not met  -         Gait Training Goal 1 (PT)    Activity/Assistive Device (Gait Training Goal 1, PT) gait (walking locomotion);decrease asymmetrical patterns;decrease fall risk;diminish gait deviation;improve balance and speed;forward stepping;increase endurance/gait distance;increase energy conservation;assistive device use;walker, rolling  -      [place] Level (Gait Training Goal 1, PT) standby assist  -      Distance (Gait Training Goal 1, PT) 200' with pain 3/10 or less  -      Time Frame (Gait Training Goal 1, PT) long term goal (LTG);10 days  -      Progress/Outcome (Gait Training Goal 1, PT) goal not met  -         Problem Specific Goal 1 (PT)    Problem Specific Goal 1 (PT) Pt will demo indep to maintain spinal precautions and don/doff TLSO, as needed to return to baseline with less pain  -      Time Frame (Problem Specific Goal 1, PT) long-term goal (LTG);by discharge  -      Progress/Outcome (Problem Specific Goal 1, PT) goal not met  -                User Key  (r) = Recorded By, (t) = Taken By, (c) = Cosigned By      Initials Name Provider Type Barbi Birmingham PTA Physical Therapist Assistant PT                        PT Discharge Summary  Reason for Discharge: Discharge from facility  Outcomes Achieved: Refer to plan of care for updates on goals achieved  Discharge Destination: Inpatient rehabilitation facility      Barbi Chaney PTA   7/24/2025

## 2025-07-24 NOTE — THERAPY EVALUATION
Feet on Uneven Surfaces  Reason if not Attempted: Not attempted due to medical condition or safety concerns  CARE Score: 88  Discharge Goal: Not Attempted    1 Step (Curb)  Reason if not Attempted: Not attempted due to medical condition or safety concerns  CARE Score: 88  Discharge Goal: Partial/moderate assistance    4 Steps  Reason if not Attempted: Not attempted due to medical condition or safety concerns  CARE Score: 88  Discharge Goal: Partial/moderate assistance    12 Steps  Reason if not Attempted: Not attempted due to medical condition or safety concerns  CARE Score: 88  Discharge Goal: Not Attempted    Wheel 50 Feet with Two Turns  Reason if not Attempted: Not attempted due to medical condition or safety concerns  CARE Score: 88  Discharge Goal: Independent    Wheel 150 Feet  Reason if not Attempted: Not attempted due to medical condition or safety concerns  CARE Score: 88  Discharge Goal: Independent      LAST TREATMENT TIME  PT Individual Minutes  Time In: 0900  Time Out: 1000  Minutes: 60      Electronically signed by KYLIE Colvin on 7/24/2025 at 12:58 PM

## 2025-07-25 LAB
INR PPP: 1.39 (ref 0.88–1.18)
PROTHROMBIN TIME: 16.9 SEC (ref 12–14.6)

## 2025-07-25 PROCEDURE — 94150 VITAL CAPACITY TEST: CPT

## 2025-07-25 PROCEDURE — 97535 SELF CARE MNGMENT TRAINING: CPT

## 2025-07-25 PROCEDURE — 97116 GAIT TRAINING THERAPY: CPT

## 2025-07-25 PROCEDURE — 85610 PROTHROMBIN TIME: CPT

## 2025-07-25 PROCEDURE — 2700000000 HC OXYGEN THERAPY PER DAY

## 2025-07-25 PROCEDURE — 1180000000 HC REHAB R&B

## 2025-07-25 PROCEDURE — 97110 THERAPEUTIC EXERCISES: CPT

## 2025-07-25 PROCEDURE — 97530 THERAPEUTIC ACTIVITIES: CPT

## 2025-07-25 PROCEDURE — 94760 N-INVAS EAR/PLS OXIMETRY 1: CPT

## 2025-07-25 PROCEDURE — 36415 COLL VENOUS BLD VENIPUNCTURE: CPT

## 2025-07-25 PROCEDURE — 6370000000 HC RX 637 (ALT 250 FOR IP): Performed by: STUDENT IN AN ORGANIZED HEALTH CARE EDUCATION/TRAINING PROGRAM

## 2025-07-25 RX ORDER — WARFARIN SODIUM 5 MG/1
10 TABLET ORAL
Status: COMPLETED | OUTPATIENT
Start: 2025-07-25 | End: 2025-07-25

## 2025-07-25 RX ADMIN — HYDROCODONE BITARTRATE AND ACETAMINOPHEN 1 TABLET: 7.5; 325 TABLET ORAL at 01:54

## 2025-07-25 RX ADMIN — CYANOCOBALAMIN TAB 500 MCG 1000 MCG: 500 TAB at 08:10

## 2025-07-25 RX ADMIN — PANTOPRAZOLE SODIUM 40 MG: 40 TABLET, DELAYED RELEASE ORAL at 05:30

## 2025-07-25 RX ADMIN — WARFARIN SODIUM 10 MG: 5 TABLET ORAL at 17:11

## 2025-07-25 RX ADMIN — ESCITALOPRAM OXALATE 20 MG: 10 TABLET ORAL at 08:10

## 2025-07-25 RX ADMIN — ATORVASTATIN CALCIUM 10 MG: 20 TABLET, FILM COATED ORAL at 08:10

## 2025-07-25 RX ADMIN — HYDROCODONE BITARTRATE AND ACETAMINOPHEN 1 TABLET: 7.5; 325 TABLET ORAL at 20:39

## 2025-07-25 RX ADMIN — LISINOPRIL 10 MG: 10 TABLET ORAL at 20:40

## 2025-07-25 RX ADMIN — Medication 2 SPRAY: at 08:09

## 2025-07-25 RX ADMIN — Medication 100 UNITS: at 08:10

## 2025-07-25 ASSESSMENT — PAIN SCALES - GENERAL
PAINLEVEL_OUTOF10: 4
PAINLEVEL_OUTOF10: 6
PAINLEVEL_OUTOF10: 8
PAINLEVEL_OUTOF10: 0

## 2025-07-25 ASSESSMENT — PAIN DESCRIPTION - ONSET: ONSET: ON-GOING

## 2025-07-25 ASSESSMENT — PAIN DESCRIPTION - DESCRIPTORS
DESCRIPTORS: ACHING
DESCRIPTORS: ACHING

## 2025-07-25 ASSESSMENT — PAIN DESCRIPTION - FREQUENCY: FREQUENCY: CONTINUOUS

## 2025-07-25 ASSESSMENT — PAIN DESCRIPTION - LOCATION
LOCATION: BACK
LOCATION: BACK

## 2025-07-25 ASSESSMENT — PAIN DESCRIPTION - ORIENTATION
ORIENTATION: MID
ORIENTATION: LOWER

## 2025-07-25 ASSESSMENT — PAIN DESCRIPTION - PAIN TYPE: TYPE: ACUTE PAIN

## 2025-07-26 LAB
INR PPP: 1.36 (ref 0.88–1.18)
PROTHROMBIN TIME: 16.7 SEC (ref 12–14.6)

## 2025-07-26 PROCEDURE — 6370000000 HC RX 637 (ALT 250 FOR IP): Performed by: STUDENT IN AN ORGANIZED HEALTH CARE EDUCATION/TRAINING PROGRAM

## 2025-07-26 PROCEDURE — 97530 THERAPEUTIC ACTIVITIES: CPT

## 2025-07-26 PROCEDURE — 97116 GAIT TRAINING THERAPY: CPT

## 2025-07-26 PROCEDURE — 85610 PROTHROMBIN TIME: CPT

## 2025-07-26 PROCEDURE — 99232 SBSQ HOSP IP/OBS MODERATE 35: CPT | Performed by: STUDENT IN AN ORGANIZED HEALTH CARE EDUCATION/TRAINING PROGRAM

## 2025-07-26 PROCEDURE — 94760 N-INVAS EAR/PLS OXIMETRY 1: CPT

## 2025-07-26 PROCEDURE — 97110 THERAPEUTIC EXERCISES: CPT

## 2025-07-26 PROCEDURE — 36415 COLL VENOUS BLD VENIPUNCTURE: CPT

## 2025-07-26 PROCEDURE — 97535 SELF CARE MNGMENT TRAINING: CPT

## 2025-07-26 PROCEDURE — 1180000000 HC REHAB R&B

## 2025-07-26 PROCEDURE — 94150 VITAL CAPACITY TEST: CPT

## 2025-07-26 PROCEDURE — 2700000000 HC OXYGEN THERAPY PER DAY

## 2025-07-26 RX ORDER — SENNA AND DOCUSATE SODIUM 50; 8.6 MG/1; MG/1
2 TABLET, FILM COATED ORAL DAILY
Status: DISCONTINUED | OUTPATIENT
Start: 2025-07-26 | End: 2025-08-01 | Stop reason: HOSPADM

## 2025-07-26 RX ORDER — POLYETHYLENE GLYCOL 3350 17 G/17G
17 POWDER, FOR SOLUTION ORAL DAILY
Status: DISCONTINUED | OUTPATIENT
Start: 2025-07-26 | End: 2025-08-01 | Stop reason: HOSPADM

## 2025-07-26 RX ADMIN — LISINOPRIL 10 MG: 10 TABLET ORAL at 21:38

## 2025-07-26 RX ADMIN — HYDROCODONE BITARTRATE AND ACETAMINOPHEN 1 TABLET: 7.5; 325 TABLET ORAL at 17:49

## 2025-07-26 RX ADMIN — WARFARIN SODIUM 7.5 MG: 2.5 TABLET ORAL at 16:22

## 2025-07-26 RX ADMIN — Medication 100 UNITS: at 08:11

## 2025-07-26 RX ADMIN — CYANOCOBALAMIN TAB 500 MCG 1000 MCG: 500 TAB at 08:11

## 2025-07-26 RX ADMIN — Medication 2 SPRAY: at 08:11

## 2025-07-26 RX ADMIN — HYDROCODONE BITARTRATE AND ACETAMINOPHEN 1 TABLET: 7.5; 325 TABLET ORAL at 21:38

## 2025-07-26 RX ADMIN — PANTOPRAZOLE SODIUM 40 MG: 40 TABLET, DELAYED RELEASE ORAL at 05:39

## 2025-07-26 RX ADMIN — ESCITALOPRAM OXALATE 20 MG: 10 TABLET ORAL at 08:11

## 2025-07-26 RX ADMIN — ATORVASTATIN CALCIUM 10 MG: 20 TABLET, FILM COATED ORAL at 08:11

## 2025-07-26 ASSESSMENT — PAIN DESCRIPTION - ORIENTATION
ORIENTATION: POSTERIOR;LOWER
ORIENTATION: POSTERIOR;LOWER

## 2025-07-26 ASSESSMENT — PAIN DESCRIPTION - LOCATION
LOCATION: BACK
LOCATION: BACK

## 2025-07-26 ASSESSMENT — PAIN DESCRIPTION - PAIN TYPE: TYPE: ACUTE PAIN

## 2025-07-26 ASSESSMENT — PAIN SCALES - GENERAL
PAINLEVEL_OUTOF10: 0
PAINLEVEL_OUTOF10: 2
PAINLEVEL_OUTOF10: 6
PAINLEVEL_OUTOF10: 7

## 2025-07-26 ASSESSMENT — PAIN - FUNCTIONAL ASSESSMENT
PAIN_FUNCTIONAL_ASSESSMENT: PREVENTS OR INTERFERES SOME ACTIVE ACTIVITIES AND ADLS
PAIN_FUNCTIONAL_ASSESSMENT: PREVENTS OR INTERFERES SOME ACTIVE ACTIVITIES AND ADLS

## 2025-07-26 ASSESSMENT — PAIN DESCRIPTION - FREQUENCY: FREQUENCY: CONTINUOUS

## 2025-07-26 ASSESSMENT — PAIN DESCRIPTION - DESCRIPTORS
DESCRIPTORS: DISCOMFORT;HEAVINESS
DESCRIPTORS: DISCOMFORT

## 2025-07-26 ASSESSMENT — PAIN DESCRIPTION - ONSET: ONSET: ON-GOING

## 2025-07-27 LAB
INR PPP: 1.46 (ref 0.88–1.18)
PROTHROMBIN TIME: 17.6 SEC (ref 12–14.6)

## 2025-07-27 PROCEDURE — 85610 PROTHROMBIN TIME: CPT

## 2025-07-27 PROCEDURE — 2700000000 HC OXYGEN THERAPY PER DAY

## 2025-07-27 PROCEDURE — 36415 COLL VENOUS BLD VENIPUNCTURE: CPT

## 2025-07-27 PROCEDURE — 6370000000 HC RX 637 (ALT 250 FOR IP): Performed by: STUDENT IN AN ORGANIZED HEALTH CARE EDUCATION/TRAINING PROGRAM

## 2025-07-27 PROCEDURE — 1180000000 HC REHAB R&B

## 2025-07-27 RX ADMIN — Medication 100 UNITS: at 10:33

## 2025-07-27 RX ADMIN — ESCITALOPRAM OXALATE 20 MG: 10 TABLET ORAL at 10:33

## 2025-07-27 RX ADMIN — SENNOSIDES AND DOCUSATE SODIUM 2 TABLET: 8.6; 5 TABLET ORAL at 10:33

## 2025-07-27 RX ADMIN — CYANOCOBALAMIN TAB 500 MCG 1000 MCG: 500 TAB at 10:33

## 2025-07-27 RX ADMIN — HYDROCODONE BITARTRATE AND ACETAMINOPHEN 1 TABLET: 7.5; 325 TABLET ORAL at 20:31

## 2025-07-27 RX ADMIN — HYDROCODONE BITARTRATE AND ACETAMINOPHEN 1 TABLET: 5; 325 TABLET ORAL at 16:23

## 2025-07-27 RX ADMIN — LISINOPRIL 10 MG: 10 TABLET ORAL at 20:28

## 2025-07-27 RX ADMIN — PANTOPRAZOLE SODIUM 40 MG: 40 TABLET, DELAYED RELEASE ORAL at 05:42

## 2025-07-27 RX ADMIN — Medication 2 SPRAY: at 10:33

## 2025-07-27 RX ADMIN — WARFARIN SODIUM 7.5 MG: 2.5 TABLET ORAL at 16:23

## 2025-07-27 RX ADMIN — ATORVASTATIN CALCIUM 10 MG: 20 TABLET, FILM COATED ORAL at 10:33

## 2025-07-27 ASSESSMENT — PAIN SCALES - GENERAL
PAINLEVEL_OUTOF10: 0
PAINLEVEL_OUTOF10: 6
PAINLEVEL_OUTOF10: 8
PAINLEVEL_OUTOF10: 0

## 2025-07-27 ASSESSMENT — PAIN DESCRIPTION - LOCATION
LOCATION: BACK
LOCATION: BACK

## 2025-07-27 ASSESSMENT — PAIN DESCRIPTION - PAIN TYPE: TYPE: ACUTE PAIN

## 2025-07-27 ASSESSMENT — PAIN DESCRIPTION - ORIENTATION
ORIENTATION: LOWER;POSTERIOR
ORIENTATION: LOWER;POSTERIOR

## 2025-07-27 ASSESSMENT — PAIN DESCRIPTION - DESCRIPTORS
DESCRIPTORS: ACHING;CRUSHING
DESCRIPTORS: ACHING

## 2025-07-27 ASSESSMENT — PAIN DESCRIPTION - FREQUENCY: FREQUENCY: CONTINUOUS

## 2025-07-27 ASSESSMENT — PAIN DESCRIPTION - ONSET: ONSET: ON-GOING

## 2025-07-28 LAB
ALBUMIN SERPL-MCNC: 3.6 G/DL (ref 3.5–5.2)
ALP SERPL-CCNC: 72 U/L (ref 35–104)
ALT SERPL-CCNC: 10 U/L (ref 10–35)
ANION GAP SERPL CALCULATED.3IONS-SCNC: 9 MMOL/L (ref 8–16)
AST SERPL-CCNC: 16 U/L (ref 10–35)
BASOPHILS # BLD: 0.1 K/UL (ref 0–0.2)
BASOPHILS NFR BLD: 1.4 % (ref 0–1)
BILIRUB SERPL-MCNC: 0.8 MG/DL (ref 0.2–1.2)
BUN SERPL-MCNC: 23 MG/DL (ref 8–23)
CALCIUM SERPL-MCNC: 9.9 MG/DL (ref 8.2–9.6)
CHLORIDE SERPL-SCNC: 100 MMOL/L (ref 98–107)
CO2 SERPL-SCNC: 25 MMOL/L (ref 22–29)
CREAT SERPL-MCNC: 0.7 MG/DL (ref 0.5–0.9)
EOSINOPHIL # BLD: 0.3 K/UL (ref 0–0.6)
EOSINOPHIL NFR BLD: 5.1 % (ref 0–5)
ERYTHROCYTE [DISTWIDTH] IN BLOOD BY AUTOMATED COUNT: 13.2 % (ref 11.5–14.5)
GLUCOSE SERPL-MCNC: 103 MG/DL (ref 70–99)
HCT VFR BLD AUTO: 32.3 % (ref 37–47)
HGB BLD-MCNC: 10.3 G/DL (ref 12–16)
IMM GRANULOCYTES # BLD: 0 K/UL
INR PPP: 1.57 (ref 0.88–1.18)
LYMPHOCYTES # BLD: 1.3 K/UL (ref 1.1–4.5)
LYMPHOCYTES NFR BLD: 22.2 % (ref 20–40)
MCH RBC QN AUTO: 30.2 PG (ref 27–31)
MCHC RBC AUTO-ENTMCNC: 31.9 G/DL (ref 33–37)
MCV RBC AUTO: 94.7 FL (ref 81–99)
MONOCYTES # BLD: 0.7 K/UL (ref 0–0.9)
MONOCYTES NFR BLD: 11.1 % (ref 0–10)
NEUTROPHILS # BLD: 3.5 K/UL (ref 1.5–7.5)
NEUTS SEG NFR BLD: 59.9 % (ref 50–65)
PLATELET # BLD AUTO: 201 K/UL (ref 130–400)
PMV BLD AUTO: 10.4 FL (ref 9.4–12.3)
POTASSIUM SERPL-SCNC: 4.3 MMOL/L (ref 3.5–5)
PROT SERPL-MCNC: 6 G/DL (ref 6.4–8.3)
PROTHROMBIN TIME: 18.6 SEC (ref 12–14.6)
RBC # BLD AUTO: 3.41 M/UL (ref 4.2–5.4)
SODIUM SERPL-SCNC: 134 MMOL/L (ref 136–145)
WBC # BLD AUTO: 5.9 K/UL (ref 4.8–10.8)

## 2025-07-28 PROCEDURE — 92610 EVALUATE SWALLOWING FUNCTION: CPT

## 2025-07-28 PROCEDURE — 99232 SBSQ HOSP IP/OBS MODERATE 35: CPT | Performed by: STUDENT IN AN ORGANIZED HEALTH CARE EDUCATION/TRAINING PROGRAM

## 2025-07-28 PROCEDURE — 36415 COLL VENOUS BLD VENIPUNCTURE: CPT

## 2025-07-28 PROCEDURE — 6370000000 HC RX 637 (ALT 250 FOR IP): Performed by: STUDENT IN AN ORGANIZED HEALTH CARE EDUCATION/TRAINING PROGRAM

## 2025-07-28 PROCEDURE — 80053 COMPREHEN METABOLIC PANEL: CPT

## 2025-07-28 PROCEDURE — 1180000000 HC REHAB R&B

## 2025-07-28 PROCEDURE — 97116 GAIT TRAINING THERAPY: CPT

## 2025-07-28 PROCEDURE — 97535 SELF CARE MNGMENT TRAINING: CPT

## 2025-07-28 PROCEDURE — 2700000000 HC OXYGEN THERAPY PER DAY

## 2025-07-28 PROCEDURE — 97530 THERAPEUTIC ACTIVITIES: CPT

## 2025-07-28 PROCEDURE — 94150 VITAL CAPACITY TEST: CPT

## 2025-07-28 PROCEDURE — 85025 COMPLETE CBC W/AUTO DIFF WBC: CPT

## 2025-07-28 PROCEDURE — 92522 EVALUATE SPEECH PRODUCTION: CPT

## 2025-07-28 PROCEDURE — 85610 PROTHROMBIN TIME: CPT

## 2025-07-28 PROCEDURE — 94760 N-INVAS EAR/PLS OXIMETRY 1: CPT

## 2025-07-28 PROCEDURE — 97110 THERAPEUTIC EXERCISES: CPT

## 2025-07-28 RX ORDER — CLONIDINE HYDROCHLORIDE 0.1 MG/1
0.1 TABLET ORAL EVERY 4 HOURS PRN
Status: DISCONTINUED | OUTPATIENT
Start: 2025-07-28 | End: 2025-08-01 | Stop reason: HOSPADM

## 2025-07-28 RX ORDER — WARFARIN SODIUM 5 MG/1
10 TABLET ORAL
Status: COMPLETED | OUTPATIENT
Start: 2025-07-28 | End: 2025-07-28

## 2025-07-28 RX ADMIN — Medication 100 UNITS: at 08:41

## 2025-07-28 RX ADMIN — WARFARIN SODIUM 10 MG: 5 TABLET ORAL at 17:11

## 2025-07-28 RX ADMIN — ATORVASTATIN CALCIUM 10 MG: 20 TABLET, FILM COATED ORAL at 08:42

## 2025-07-28 RX ADMIN — POLYETHYLENE GLYCOL 3350 17 G: 17 POWDER, FOR SOLUTION ORAL at 08:41

## 2025-07-28 RX ADMIN — HYDROCODONE BITARTRATE AND ACETAMINOPHEN 1 TABLET: 7.5; 325 TABLET ORAL at 05:51

## 2025-07-28 RX ADMIN — PANTOPRAZOLE SODIUM 40 MG: 40 TABLET, DELAYED RELEASE ORAL at 05:51

## 2025-07-28 RX ADMIN — LISINOPRIL 10 MG: 10 TABLET ORAL at 20:23

## 2025-07-28 RX ADMIN — HYDROCODONE BITARTRATE AND ACETAMINOPHEN 1 TABLET: 5; 325 TABLET ORAL at 17:11

## 2025-07-28 RX ADMIN — HYDROCODONE BITARTRATE AND ACETAMINOPHEN 1 TABLET: 7.5; 325 TABLET ORAL at 20:23

## 2025-07-28 RX ADMIN — ESCITALOPRAM OXALATE 20 MG: 10 TABLET ORAL at 08:42

## 2025-07-28 RX ADMIN — CYANOCOBALAMIN TAB 500 MCG 1000 MCG: 500 TAB at 08:41

## 2025-07-28 RX ADMIN — SENNOSIDES AND DOCUSATE SODIUM 2 TABLET: 8.6; 5 TABLET ORAL at 08:41

## 2025-07-28 RX ADMIN — FUROSEMIDE 20 MG: 20 TABLET ORAL at 17:11

## 2025-07-28 ASSESSMENT — PAIN DESCRIPTION - ORIENTATION
ORIENTATION: LOWER

## 2025-07-28 ASSESSMENT — PAIN DESCRIPTION - LOCATION
LOCATION: BACK

## 2025-07-28 ASSESSMENT — PAIN DESCRIPTION - PAIN TYPE
TYPE: ACUTE PAIN
TYPE: ACUTE PAIN

## 2025-07-28 ASSESSMENT — PAIN SCALES - GENERAL
PAINLEVEL_OUTOF10: 0
PAINLEVEL_OUTOF10: 6
PAINLEVEL_OUTOF10: 0
PAINLEVEL_OUTOF10: 7
PAINLEVEL_OUTOF10: 7
PAINLEVEL_OUTOF10: 4

## 2025-07-28 ASSESSMENT — PAIN DESCRIPTION - DESCRIPTORS
DESCRIPTORS: SHARP
DESCRIPTORS: SHARP
DESCRIPTORS: ACHING

## 2025-07-28 ASSESSMENT — PAIN - FUNCTIONAL ASSESSMENT
PAIN_FUNCTIONAL_ASSESSMENT: ACTIVITIES ARE NOT PREVENTED
PAIN_FUNCTIONAL_ASSESSMENT: ACTIVITIES ARE NOT PREVENTED
PAIN_FUNCTIONAL_ASSESSMENT: PREVENTS OR INTERFERES SOME ACTIVE ACTIVITIES AND ADLS

## 2025-07-28 ASSESSMENT — PAIN DESCRIPTION - ONSET
ONSET: ON-GOING
ONSET: ON-GOING

## 2025-07-28 ASSESSMENT — PAIN DESCRIPTION - FREQUENCY
FREQUENCY: CONTINUOUS
FREQUENCY: CONTINUOUS

## 2025-07-28 NOTE — PATIENT CARE CONFERENCE
[facility] ACUTE INPATIENT REHABILITATION  TEAM CONFERENCE NOTE    Date: 2025  Patient Name: Corinne Nelson        MRN: [id number]    : 1932  (89 y.o.)  Gender: female      Diagnosis: L2 COMPRESSION FX      PHYSICAL THERAPY  GROSS ASSESSMENT       BED MOBILITY  Bed mobility  Rolling to Left: Contact guard assistance (RUE reach to bed rail)  Rolling to Right: Stand by assistance  Supine to Sit: Contact guard assistance  Sit to Supine: Minimal assistance (Log roll from R; BUE on bed; Therapist assist w/ BLE)       TRANSFERS  Transfers  Sit to Stand: Stand by assistance, Contact guard assistance  Stand to Sit: Stand by assistance  Bed to Chair: Contact guard assistance (Using RW)  Stand Pivot Transfers: Minimal Assistance (WC to R to Toilet; Min assist to initiate standing)  Lateral Transfers: Contact guard assistance (W/C to Recliner using RW)  Car Transfer: Minimal Assistance (WC <> Car w/ RW; Min A to initate stand and lower into car.)  Comment: pt uses one hand on WC and one on walker for STS transitions; sometimes needs 2nd or 3rd att to achieve standing (home use of lift chair reported)  WHEELCHAIR PROPULSION     AMBULATION  Ambulation  Surface: Level tile  Device: Rolling Walker  Other Apparatus: O2 (1L)  Assistance: Contact guard assistance  Quality of Gait: reciprocal pattern, no LOB  Gait Deviations: Decreased step height, Decreased step length  Distance: 200'  Comments: 96% spO2 post amb and 64HR  STAIRS  Stairs  # Steps : 8  Stairs Height: 4\"  Rails: Right ascending  Assistance: Contact guard assistance  Comment: step to pattern leading with L LE asc and R LE desc; some reluctance when descending but good control and no LOB  GOALS:  Short Term Goals  Time Frame for Short Term Goals: 1 WEEK  Short Term Goal 1: SUPINE TO SIT CGA  Short Term Goal 2: BED TO CHAIR TRANSFER W/ RW CGA  Short Term Goal 3: ' W/ RW SBA  Short Term Goal 4: CAR TRANSFER W/ RW CGA  Short Term Goal 5: ASCEND/DESCEND

## 2025-07-29 LAB
INR PPP: 1.71 (ref 0.88–1.18)
PROTHROMBIN TIME: 19.8 SEC (ref 12–14.6)

## 2025-07-29 PROCEDURE — 94760 N-INVAS EAR/PLS OXIMETRY 1: CPT

## 2025-07-29 PROCEDURE — 97535 SELF CARE MNGMENT TRAINING: CPT

## 2025-07-29 PROCEDURE — 36415 COLL VENOUS BLD VENIPUNCTURE: CPT

## 2025-07-29 PROCEDURE — 92526 ORAL FUNCTION THERAPY: CPT

## 2025-07-29 PROCEDURE — 2700000000 HC OXYGEN THERAPY PER DAY

## 2025-07-29 PROCEDURE — 6370000000 HC RX 637 (ALT 250 FOR IP): Performed by: STUDENT IN AN ORGANIZED HEALTH CARE EDUCATION/TRAINING PROGRAM

## 2025-07-29 PROCEDURE — 97116 GAIT TRAINING THERAPY: CPT

## 2025-07-29 PROCEDURE — 1180000000 HC REHAB R&B

## 2025-07-29 PROCEDURE — 97530 THERAPEUTIC ACTIVITIES: CPT

## 2025-07-29 PROCEDURE — 97110 THERAPEUTIC EXERCISES: CPT

## 2025-07-29 PROCEDURE — 94150 VITAL CAPACITY TEST: CPT

## 2025-07-29 PROCEDURE — 85610 PROTHROMBIN TIME: CPT

## 2025-07-29 RX ORDER — WARFARIN SODIUM 5 MG/1
10 TABLET ORAL
Status: COMPLETED | OUTPATIENT
Start: 2025-07-29 | End: 2025-07-29

## 2025-07-29 RX ADMIN — Medication 100 UNITS: at 07:40

## 2025-07-29 RX ADMIN — ESCITALOPRAM OXALATE 20 MG: 10 TABLET ORAL at 07:40

## 2025-07-29 RX ADMIN — Medication 12.5 MG: at 07:40

## 2025-07-29 RX ADMIN — HYDROCODONE BITARTRATE AND ACETAMINOPHEN 1 TABLET: 7.5; 325 TABLET ORAL at 20:38

## 2025-07-29 RX ADMIN — PANTOPRAZOLE SODIUM 40 MG: 40 TABLET, DELAYED RELEASE ORAL at 05:39

## 2025-07-29 RX ADMIN — HYDROCODONE BITARTRATE AND ACETAMINOPHEN 1 TABLET: 7.5; 325 TABLET ORAL at 04:32

## 2025-07-29 RX ADMIN — WARFARIN SODIUM 10 MG: 5 TABLET ORAL at 16:49

## 2025-07-29 RX ADMIN — CYANOCOBALAMIN TAB 500 MCG 1000 MCG: 500 TAB at 07:40

## 2025-07-29 RX ADMIN — POLYETHYLENE GLYCOL 3350 17 G: 17 POWDER, FOR SOLUTION ORAL at 07:39

## 2025-07-29 RX ADMIN — LISINOPRIL 10 MG: 10 TABLET ORAL at 20:38

## 2025-07-29 RX ADMIN — ATORVASTATIN CALCIUM 10 MG: 20 TABLET, FILM COATED ORAL at 07:40

## 2025-07-29 RX ADMIN — SENNOSIDES AND DOCUSATE SODIUM 2 TABLET: 8.6; 5 TABLET ORAL at 07:40

## 2025-07-29 ASSESSMENT — PAIN SCALES - GENERAL
PAINLEVEL_OUTOF10: 0
PAINLEVEL_OUTOF10: 6
PAINLEVEL_OUTOF10: 6

## 2025-07-29 ASSESSMENT — PAIN DESCRIPTION - DESCRIPTORS
DESCRIPTORS: SHARP
DESCRIPTORS: SHARP

## 2025-07-29 ASSESSMENT — PAIN DESCRIPTION - ORIENTATION
ORIENTATION: LOWER
ORIENTATION: LOWER

## 2025-07-29 ASSESSMENT — PAIN DESCRIPTION - FREQUENCY
FREQUENCY: CONTINUOUS
FREQUENCY: CONTINUOUS

## 2025-07-29 ASSESSMENT — PAIN DESCRIPTION - ONSET
ONSET: ON-GOING
ONSET: ON-GOING

## 2025-07-29 ASSESSMENT — PAIN DESCRIPTION - LOCATION
LOCATION: BACK
LOCATION: BACK

## 2025-07-29 ASSESSMENT — PAIN DESCRIPTION - PAIN TYPE
TYPE: ACUTE PAIN
TYPE: ACUTE PAIN

## 2025-07-29 NOTE — PATIENT CARE CONFERENCE
TEAM CONFERENCE:     Team conference completed today with PT/OT/speech/nursing/Care coordinator to review in depth patients care and discharge planning. At least 35 minutes spent coordinating care for patient >50% of time spent in coordination of care.       Jean Pierre Rizvi MD

## 2025-07-30 LAB
ALBUMIN SERPL-MCNC: 3.8 G/DL (ref 3.5–5.2)
ALP SERPL-CCNC: 84 U/L (ref 35–104)
ALT SERPL-CCNC: 11 U/L (ref 10–35)
ANION GAP SERPL CALCULATED.3IONS-SCNC: 11 MMOL/L (ref 8–16)
AST SERPL-CCNC: 16 U/L (ref 10–35)
BASOPHILS # BLD: 0.1 K/UL (ref 0–0.2)
BASOPHILS NFR BLD: 1.2 % (ref 0–1)
BILIRUB SERPL-MCNC: 0.7 MG/DL (ref 0.2–1.2)
BUN SERPL-MCNC: 17 MG/DL (ref 8–23)
CALCIUM SERPL-MCNC: 9.9 MG/DL (ref 8.2–9.6)
CHLORIDE SERPL-SCNC: 96 MMOL/L (ref 98–107)
CO2 SERPL-SCNC: 23 MMOL/L (ref 22–29)
CREAT SERPL-MCNC: 0.6 MG/DL (ref 0.5–0.9)
EOSINOPHIL # BLD: 0.2 K/UL (ref 0–0.6)
EOSINOPHIL NFR BLD: 3.4 % (ref 0–5)
ERYTHROCYTE [DISTWIDTH] IN BLOOD BY AUTOMATED COUNT: 13 % (ref 11.5–14.5)
GLUCOSE SERPL-MCNC: 104 MG/DL (ref 70–99)
HCT VFR BLD AUTO: 32.6 % (ref 37–47)
HGB BLD-MCNC: 10.6 G/DL (ref 12–16)
IMM GRANULOCYTES # BLD: 0 K/UL
INR PPP: 1.98 (ref 0.88–1.18)
LYMPHOCYTES # BLD: 1.6 K/UL (ref 1.1–4.5)
LYMPHOCYTES NFR BLD: 26.3 % (ref 20–40)
MCH RBC QN AUTO: 30.3 PG (ref 27–31)
MCHC RBC AUTO-ENTMCNC: 32.5 G/DL (ref 33–37)
MCV RBC AUTO: 93.1 FL (ref 81–99)
MONOCYTES # BLD: 0.7 K/UL (ref 0–0.9)
MONOCYTES NFR BLD: 11.9 % (ref 0–10)
NEUTROPHILS # BLD: 3.4 K/UL (ref 1.5–7.5)
NEUTS SEG NFR BLD: 56.7 % (ref 50–65)
PLATELET # BLD AUTO: 238 K/UL (ref 130–400)
PMV BLD AUTO: 10.3 FL (ref 9.4–12.3)
POTASSIUM SERPL-SCNC: 4.5 MMOL/L (ref 3.5–5)
PROT SERPL-MCNC: 6.4 G/DL (ref 6.4–8.3)
PROTHROMBIN TIME: 22.2 SEC (ref 12–14.6)
RBC # BLD AUTO: 3.5 M/UL (ref 4.2–5.4)
SODIUM SERPL-SCNC: 130 MMOL/L (ref 136–145)
WBC # BLD AUTO: 5.9 K/UL (ref 4.8–10.8)

## 2025-07-30 PROCEDURE — 94760 N-INVAS EAR/PLS OXIMETRY 1: CPT

## 2025-07-30 PROCEDURE — 94150 VITAL CAPACITY TEST: CPT

## 2025-07-30 PROCEDURE — 85025 COMPLETE CBC W/AUTO DIFF WBC: CPT

## 2025-07-30 PROCEDURE — 1180000000 HC REHAB R&B

## 2025-07-30 PROCEDURE — 97535 SELF CARE MNGMENT TRAINING: CPT

## 2025-07-30 PROCEDURE — 97530 THERAPEUTIC ACTIVITIES: CPT

## 2025-07-30 PROCEDURE — 36415 COLL VENOUS BLD VENIPUNCTURE: CPT

## 2025-07-30 PROCEDURE — 6370000000 HC RX 637 (ALT 250 FOR IP): Performed by: STUDENT IN AN ORGANIZED HEALTH CARE EDUCATION/TRAINING PROGRAM

## 2025-07-30 PROCEDURE — 80053 COMPREHEN METABOLIC PANEL: CPT

## 2025-07-30 PROCEDURE — 97116 GAIT TRAINING THERAPY: CPT

## 2025-07-30 PROCEDURE — 99232 SBSQ HOSP IP/OBS MODERATE 35: CPT | Performed by: PSYCHIATRY & NEUROLOGY

## 2025-07-30 PROCEDURE — 85610 PROTHROMBIN TIME: CPT

## 2025-07-30 PROCEDURE — 97110 THERAPEUTIC EXERCISES: CPT

## 2025-07-30 PROCEDURE — 92526 ORAL FUNCTION THERAPY: CPT

## 2025-07-30 RX ORDER — WARFARIN SODIUM 5 MG/1
10 TABLET ORAL
Status: COMPLETED | OUTPATIENT
Start: 2025-07-30 | End: 2025-07-30

## 2025-07-30 RX ORDER — FUROSEMIDE 20 MG/1
20 TABLET ORAL DAILY
Status: DISCONTINUED | OUTPATIENT
Start: 2025-07-31 | End: 2025-08-01 | Stop reason: HOSPADM

## 2025-07-30 RX ADMIN — HYDROCODONE BITARTRATE AND ACETAMINOPHEN 1 TABLET: 7.5; 325 TABLET ORAL at 01:36

## 2025-07-30 RX ADMIN — ESCITALOPRAM OXALATE 20 MG: 10 TABLET ORAL at 08:12

## 2025-07-30 RX ADMIN — LISINOPRIL 10 MG: 10 TABLET ORAL at 21:15

## 2025-07-30 RX ADMIN — CYANOCOBALAMIN TAB 500 MCG 1000 MCG: 500 TAB at 08:12

## 2025-07-30 RX ADMIN — ATORVASTATIN CALCIUM 10 MG: 20 TABLET, FILM COATED ORAL at 08:12

## 2025-07-30 RX ADMIN — HYDROCODONE BITARTRATE AND ACETAMINOPHEN 1 TABLET: 5; 325 TABLET ORAL at 21:14

## 2025-07-30 RX ADMIN — Medication 12.5 MG: at 08:12

## 2025-07-30 RX ADMIN — WARFARIN SODIUM 10 MG: 5 TABLET ORAL at 17:11

## 2025-07-30 RX ADMIN — Medication 100 UNITS: at 08:12

## 2025-07-30 RX ADMIN — FUROSEMIDE 20 MG: 20 TABLET ORAL at 17:11

## 2025-07-30 RX ADMIN — HYDROCODONE BITARTRATE AND ACETAMINOPHEN 1 TABLET: 5; 325 TABLET ORAL at 09:45

## 2025-07-30 RX ADMIN — SENNOSIDES AND DOCUSATE SODIUM 2 TABLET: 8.6; 5 TABLET ORAL at 08:12

## 2025-07-30 RX ADMIN — PANTOPRAZOLE SODIUM 40 MG: 40 TABLET, DELAYED RELEASE ORAL at 05:31

## 2025-07-30 ASSESSMENT — PAIN SCALES - GENERAL
PAINLEVEL_OUTOF10: 7
PAINLEVEL_OUTOF10: 2
PAINLEVEL_OUTOF10: 6
PAINLEVEL_OUTOF10: 5
PAINLEVEL_OUTOF10: 0

## 2025-07-30 ASSESSMENT — PAIN DESCRIPTION - DESCRIPTORS
DESCRIPTORS: SHARP
DESCRIPTORS: SPASM;THROBBING
DESCRIPTORS: SHARP

## 2025-07-30 ASSESSMENT — PAIN DESCRIPTION - PAIN TYPE: TYPE: ACUTE PAIN

## 2025-07-30 ASSESSMENT — PAIN DESCRIPTION - FREQUENCY: FREQUENCY: CONTINUOUS

## 2025-07-30 ASSESSMENT — PAIN DESCRIPTION - ORIENTATION
ORIENTATION: LOWER
ORIENTATION: LOWER
ORIENTATION: POSTERIOR

## 2025-07-30 ASSESSMENT — PAIN DESCRIPTION - LOCATION
LOCATION: BACK

## 2025-07-30 ASSESSMENT — PAIN DESCRIPTION - ONSET: ONSET: ON-GOING

## 2025-07-30 ASSESSMENT — PAIN - FUNCTIONAL ASSESSMENT
PAIN_FUNCTIONAL_ASSESSMENT: ACTIVITIES ARE NOT PREVENTED

## 2025-07-30 ASSESSMENT — PAIN SCALES - WONG BAKER: WONGBAKER_NUMERICALRESPONSE: HURTS A LITTLE BIT

## 2025-07-31 LAB
ANION GAP SERPL CALCULATED.3IONS-SCNC: 13 MMOL/L (ref 8–16)
BUN SERPL-MCNC: 16 MG/DL (ref 8–23)
CALCIUM SERPL-MCNC: 10.1 MG/DL (ref 8.2–9.6)
CHLORIDE SERPL-SCNC: 94 MMOL/L (ref 98–107)
CO2 SERPL-SCNC: 24 MMOL/L (ref 22–29)
CREAT SERPL-MCNC: 0.7 MG/DL (ref 0.5–0.9)
GLUCOSE SERPL-MCNC: 103 MG/DL (ref 70–99)
INR PPP: 2.04 (ref 0.88–1.18)
POTASSIUM SERPL-SCNC: 4.5 MMOL/L (ref 3.5–5)
PROTHROMBIN TIME: 22.8 SEC (ref 12–14.6)
PTH-INTACT SERPL-MCNC: 70.5 PG/ML (ref 15–65)
SODIUM SERPL-SCNC: 131 MMOL/L (ref 136–145)

## 2025-07-31 PROCEDURE — 92526 ORAL FUNCTION THERAPY: CPT

## 2025-07-31 PROCEDURE — 97110 THERAPEUTIC EXERCISES: CPT

## 2025-07-31 PROCEDURE — 97530 THERAPEUTIC ACTIVITIES: CPT

## 2025-07-31 PROCEDURE — 94760 N-INVAS EAR/PLS OXIMETRY 1: CPT

## 2025-07-31 PROCEDURE — 97116 GAIT TRAINING THERAPY: CPT

## 2025-07-31 PROCEDURE — 2700000000 HC OXYGEN THERAPY PER DAY

## 2025-07-31 PROCEDURE — 80048 BASIC METABOLIC PNL TOTAL CA: CPT

## 2025-07-31 PROCEDURE — 1180000000 HC REHAB R&B

## 2025-07-31 PROCEDURE — 94150 VITAL CAPACITY TEST: CPT

## 2025-07-31 PROCEDURE — 85610 PROTHROMBIN TIME: CPT

## 2025-07-31 PROCEDURE — 36415 COLL VENOUS BLD VENIPUNCTURE: CPT

## 2025-07-31 PROCEDURE — 83970 ASSAY OF PARATHORMONE: CPT

## 2025-07-31 PROCEDURE — 6370000000 HC RX 637 (ALT 250 FOR IP): Performed by: STUDENT IN AN ORGANIZED HEALTH CARE EDUCATION/TRAINING PROGRAM

## 2025-07-31 RX ORDER — WARFARIN SODIUM 5 MG/1
10 TABLET ORAL
Status: COMPLETED | OUTPATIENT
Start: 2025-07-31 | End: 2025-07-31

## 2025-07-31 RX ADMIN — PANTOPRAZOLE SODIUM 40 MG: 40 TABLET, DELAYED RELEASE ORAL at 05:39

## 2025-07-31 RX ADMIN — WARFARIN SODIUM 10 MG: 5 TABLET ORAL at 17:33

## 2025-07-31 RX ADMIN — Medication 100 UNITS: at 09:17

## 2025-07-31 RX ADMIN — HYDROCODONE BITARTRATE AND ACETAMINOPHEN 1 TABLET: 7.5; 325 TABLET ORAL at 04:12

## 2025-07-31 RX ADMIN — LISINOPRIL 10 MG: 10 TABLET ORAL at 21:04

## 2025-07-31 RX ADMIN — CYANOCOBALAMIN TAB 500 MCG 1000 MCG: 500 TAB at 09:18

## 2025-07-31 RX ADMIN — ESCITALOPRAM OXALATE 20 MG: 10 TABLET ORAL at 09:18

## 2025-07-31 RX ADMIN — POLYETHYLENE GLYCOL 3350 17 G: 17 POWDER, FOR SOLUTION ORAL at 09:18

## 2025-07-31 RX ADMIN — FUROSEMIDE 20 MG: 20 TABLET ORAL at 09:18

## 2025-07-31 RX ADMIN — ATORVASTATIN CALCIUM 10 MG: 20 TABLET, FILM COATED ORAL at 09:18

## 2025-07-31 RX ADMIN — SENNOSIDES AND DOCUSATE SODIUM 2 TABLET: 8.6; 5 TABLET ORAL at 09:18

## 2025-07-31 RX ADMIN — HYDROCODONE BITARTRATE AND ACETAMINOPHEN 1 TABLET: 7.5; 325 TABLET ORAL at 21:04

## 2025-07-31 ASSESSMENT — PAIN DESCRIPTION - LOCATION
LOCATION: BACK
LOCATION: BACK

## 2025-07-31 ASSESSMENT — PAIN DESCRIPTION - ORIENTATION
ORIENTATION: POSTERIOR
ORIENTATION: LOWER

## 2025-07-31 ASSESSMENT — PAIN SCALES - GENERAL
PAINLEVEL_OUTOF10: 7
PAINLEVEL_OUTOF10: 9

## 2025-07-31 ASSESSMENT — PAIN SCALES - WONG BAKER
WONGBAKER_NUMERICALRESPONSE: HURTS A LITTLE BIT
WONGBAKER_NUMERICALRESPONSE: HURTS A LITTLE BIT

## 2025-07-31 ASSESSMENT — PAIN DESCRIPTION - DESCRIPTORS
DESCRIPTORS: ACHING
DESCRIPTORS: ACHING

## 2025-08-01 LAB
ALBUMIN SERPL-MCNC: 3.9 G/DL (ref 3.5–5.2)
ALP SERPL-CCNC: 103 U/L (ref 35–104)
ALT SERPL-CCNC: 12 U/L (ref 10–35)
ANION GAP SERPL CALCULATED.3IONS-SCNC: 13 MMOL/L (ref 8–16)
AST SERPL-CCNC: 20 U/L (ref 10–35)
BASOPHILS # BLD: 0.1 K/UL (ref 0–0.2)
BASOPHILS NFR BLD: 1.5 % (ref 0–1)
BILIRUB SERPL-MCNC: 0.6 MG/DL (ref 0.2–1.2)
BUN SERPL-MCNC: 19 MG/DL (ref 8–23)
CALCIUM SERPL-MCNC: 9.9 MG/DL (ref 8.2–9.6)
CHLORIDE SERPL-SCNC: 96 MMOL/L (ref 98–107)
CO2 SERPL-SCNC: 22 MMOL/L (ref 22–29)
CREAT SERPL-MCNC: 0.6 MG/DL (ref 0.5–0.9)
EOSINOPHIL # BLD: 0.3 K/UL (ref 0–0.6)
EOSINOPHIL NFR BLD: 4.7 % (ref 0–5)
ERYTHROCYTE [DISTWIDTH] IN BLOOD BY AUTOMATED COUNT: 12.9 % (ref 11.5–14.5)
GLUCOSE SERPL-MCNC: 103 MG/DL (ref 70–99)
HCT VFR BLD AUTO: 34.2 % (ref 37–47)
HGB BLD-MCNC: 11.2 G/DL (ref 12–16)
IMM GRANULOCYTES # BLD: 0 K/UL
INR PPP: 2.38 (ref 0.88–1.18)
LYMPHOCYTES # BLD: 1.6 K/UL (ref 1.1–4.5)
LYMPHOCYTES NFR BLD: 26.3 % (ref 20–40)
MCH RBC QN AUTO: 30.2 PG (ref 27–31)
MCHC RBC AUTO-ENTMCNC: 32.7 G/DL (ref 33–37)
MCV RBC AUTO: 92.2 FL (ref 81–99)
MONOCYTES # BLD: 0.7 K/UL (ref 0–0.9)
MONOCYTES NFR BLD: 12.4 % (ref 0–10)
NEUTROPHILS # BLD: 3.2 K/UL (ref 1.5–7.5)
NEUTS SEG NFR BLD: 54.6 % (ref 50–65)
PLATELET # BLD AUTO: 268 K/UL (ref 130–400)
PMV BLD AUTO: 9.8 FL (ref 9.4–12.3)
POTASSIUM SERPL-SCNC: 4.4 MMOL/L (ref 3.5–5)
PROT SERPL-MCNC: 6.5 G/DL (ref 6.4–8.3)
PROTHROMBIN TIME: 25.4 SEC (ref 12–14.6)
RBC # BLD AUTO: 3.71 M/UL (ref 4.2–5.4)
SODIUM SERPL-SCNC: 131 MMOL/L (ref 136–145)
WBC # BLD AUTO: 5.9 K/UL (ref 4.8–10.8)

## 2025-08-01 PROCEDURE — 97535 SELF CARE MNGMENT TRAINING: CPT

## 2025-08-01 PROCEDURE — 97530 THERAPEUTIC ACTIVITIES: CPT

## 2025-08-01 PROCEDURE — 85025 COMPLETE CBC W/AUTO DIFF WBC: CPT

## 2025-08-01 PROCEDURE — 36415 COLL VENOUS BLD VENIPUNCTURE: CPT

## 2025-08-01 PROCEDURE — 94150 VITAL CAPACITY TEST: CPT

## 2025-08-01 PROCEDURE — 85610 PROTHROMBIN TIME: CPT

## 2025-08-01 PROCEDURE — 97116 GAIT TRAINING THERAPY: CPT

## 2025-08-01 PROCEDURE — 97110 THERAPEUTIC EXERCISES: CPT

## 2025-08-01 PROCEDURE — 80053 COMPREHEN METABOLIC PANEL: CPT

## 2025-08-01 PROCEDURE — 94760 N-INVAS EAR/PLS OXIMETRY 1: CPT

## 2025-08-01 PROCEDURE — 6370000000 HC RX 637 (ALT 250 FOR IP): Performed by: STUDENT IN AN ORGANIZED HEALTH CARE EDUCATION/TRAINING PROGRAM

## 2025-08-01 RX ORDER — WARFARIN SODIUM 5 MG/1
5 TABLET ORAL DAILY
Qty: 30 TABLET | Refills: 0 | Status: SHIPPED | OUTPATIENT
Start: 2025-08-01

## 2025-08-01 RX ORDER — HYDROCODONE BITARTRATE AND ACETAMINOPHEN 5; 325 MG/1; MG/1
1 TABLET ORAL EVERY 8 HOURS PRN
Qty: 90 TABLET | Refills: 0 | Status: SHIPPED | OUTPATIENT
Start: 2025-08-01 | End: 2025-08-31

## 2025-08-01 RX ORDER — WARFARIN SODIUM 2.5 MG/1
2.5 TABLET ORAL DAILY
Qty: 30 TABLET | Refills: 3 | Status: SHIPPED | OUTPATIENT
Start: 2025-08-01

## 2025-08-01 RX ORDER — LIDOCAINE 4 G/G
1 PATCH TOPICAL DAILY
Qty: 30 EACH | Refills: 0 | Status: SHIPPED | OUTPATIENT
Start: 2025-08-02 | End: 2025-09-01

## 2025-08-01 RX ORDER — POLYETHYLENE GLYCOL 3350 17 G/17G
17 POWDER, FOR SOLUTION ORAL DAILY PRN
Qty: 30 PACKET | Refills: 0 | Status: SHIPPED | OUTPATIENT
Start: 2025-08-01 | End: 2025-08-31

## 2025-08-01 RX ADMIN — CYANOCOBALAMIN TAB 500 MCG 1000 MCG: 500 TAB at 08:06

## 2025-08-01 RX ADMIN — POLYETHYLENE GLYCOL 3350 17 G: 17 POWDER, FOR SOLUTION ORAL at 08:06

## 2025-08-01 RX ADMIN — ESCITALOPRAM OXALATE 20 MG: 10 TABLET ORAL at 08:06

## 2025-08-01 RX ADMIN — ATORVASTATIN CALCIUM 10 MG: 20 TABLET, FILM COATED ORAL at 08:06

## 2025-08-01 RX ADMIN — Medication 100 UNITS: at 08:06

## 2025-08-01 RX ADMIN — FUROSEMIDE 20 MG: 20 TABLET ORAL at 08:06

## 2025-08-01 RX ADMIN — SENNOSIDES AND DOCUSATE SODIUM 2 TABLET: 8.6; 5 TABLET ORAL at 08:06

## 2025-08-01 RX ADMIN — PANTOPRAZOLE SODIUM 40 MG: 40 TABLET, DELAYED RELEASE ORAL at 05:54

## 2025-08-01 NOTE — PLAN OF CARE
Problem: Discharge Planning  Goal: Discharge to home or other facility with appropriate resources  7/23/2025 2156 by Karina Knapp RN  Outcome: Progressing  7/23/2025 1812 by Maverick Daly RN  Outcome: Progressing  Flowsheets (Taken 7/23/2025 1804)  Discharge to home or other facility with appropriate resources:   Identify barriers to discharge with patient and caregiver   Identify discharge learning needs (meds, wound care, etc)   Refer to discharge planning if patient needs post-hospital services based on physician order or complex needs related to functional status, cognitive ability or social support system     Problem: Chronic Conditions and Co-morbidities  Goal: Patient's chronic conditions and co-morbidity symptoms are monitored and maintained or improved  7/23/2025 2156 by Karina Knapp RN  Outcome: Progressing  7/23/2025 1812 by Maverick Daly RN  Outcome: Progressing     Problem: Safety - Adult  Goal: Free from fall injury  7/23/2025 2156 by Karina Knapp RN  Outcome: Progressing  7/23/2025 1812 by Maverick Daly RN  Outcome: Progressing     Problem: ABCDS Injury Assessment  Goal: Absence of physical injury  7/23/2025 2156 by Karina Knapp RN  Outcome: Progressing  7/23/2025 1812 by Maverick Daly RN  Outcome: Progressing     Problem: Skin/Tissue Integrity  Goal: Skin integrity remains intact  Description: 1.  Monitor for areas of redness and/or skin breakdown  2.  Assess vascular access sites hourly  3.  Every 4-6 hours minimum:  Change oxygen saturation probe site  4.  Every 4-6 hours:  If on nasal continuous positive airway pressure, respiratory therapy assess nares and determine need for appliance change or resting period  Outcome: Progressing  Flowsheets (Taken 7/23/2025 1828 by Maverick Daly, RN)  Skin Integrity Remains Intact: Monitor for areas of redness and/or skin breakdown     
  Problem: Discharge Planning  Goal: Discharge to home or other facility with appropriate resources  7/30/2025 2235 by Rita De La Garza RN  Outcome: Progressing  7/30/2025 1029 by Laura Mondragon LPN  Outcome: Progressing  Flowsheets (Taken 7/30/2025 0819)  Discharge to home or other facility with appropriate resources: Refer to discharge planning if patient needs post-hospital services based on physician order or complex needs related to functional status, cognitive ability or social support system     Problem: Chronic Conditions and Co-morbidities  Goal: Patient's chronic conditions and co-morbidity symptoms are monitored and maintained or improved  7/30/2025 2235 by Rita De La Garza RN  Outcome: Progressing  7/30/2025 1029 by Laura Mondragon LPN  Outcome: Progressing  Flowsheets (Taken 7/30/2025 0819)  Care Plan - Patient's Chronic Conditions and Co-Morbidity Symptoms are Monitored and Maintained or Improved: Monitor and assess patient's chronic conditions and comorbid symptoms for stability, deterioration, or improvement     Problem: Safety - Adult  Goal: Free from fall injury  7/30/2025 2235 by Rita De La Garza RN  Outcome: Progressing  7/30/2025 1029 by Laura Mondragon LPN  Outcome: Progressing     Problem: ABCDS Injury Assessment  Goal: Absence of physical injury  7/30/2025 2235 by Rita De La Garza RN  Outcome: Progressing  7/30/2025 1029 by Laura Mondragon LPN  Outcome: Progressing     
  Problem: Discharge Planning  Goal: Discharge to home or other facility with appropriate resources  7/31/2025 1224 by Gricelda Andre RN  Outcome: Progressing  7/30/2025 2235 by Rita De La Garza RN  Outcome: Progressing     Problem: Chronic Conditions and Co-morbidities  Goal: Patient's chronic conditions and co-morbidity symptoms are monitored and maintained or improved  7/31/2025 1224 by Gricelda Andre RN  Outcome: Progressing  7/30/2025 2235 by Rita De La Garza RN  Outcome: Progressing     Problem: Safety - Adult  Goal: Free from fall injury  7/31/2025 1224 by Gricedla Andre RN  Outcome: Progressing  7/30/2025 2235 by Rita De La Garza RN  Outcome: Progressing     Problem: ABCDS Injury Assessment  Goal: Absence of physical injury  7/31/2025 1224 by Gricelda Andre RN  Outcome: Progressing  7/30/2025 2235 by Rita De La Garza RN  Outcome: Progressing     Problem: Skin/Tissue Integrity  Goal: Skin integrity remains intact  Description: 1.  Monitor for areas of redness and/or skin breakdown  2.  Assess vascular access sites hourly  3.  Every 4-6 hours minimum:  Change oxygen saturation probe site  4.  Every 4-6 hours:  If on nasal continuous positive airway pressure, respiratory therapy assess nares and determine need for appliance change or resting period  7/31/2025 1224 by Gricelda Andre RN  Outcome: Progressing  7/30/2025 2235 by Rita De La Garza RN  Outcome: Progressing     Problem: Nutrition Deficit:  Goal: Optimize nutritional status  7/31/2025 1224 by Gricelda Andre RN  Outcome: Progressing  7/30/2025 2235 by Rita De La Garza RN  Outcome: Progressing     Problem: Pain  Goal: Verbalizes/displays adequate comfort level or baseline comfort level  7/31/2025 1224 by Gricelda Andre RN  Outcome: Progressing  7/30/2025 2235 by Rita De La Garza RN  Outcome: Progressing     Problem: Neurosensory - Adult  Goal: Achieves stable or improved neurological status  7/31/2025 1224 by Thai 
  Problem: Discharge Planning  Goal: Discharge to home or other facility with appropriate resources  7/31/2025 2159 by Karina Knapp RN  Outcome: Progressing  7/31/2025 1224 by Gricelda Andre RN  Outcome: Progressing     Problem: Chronic Conditions and Co-morbidities  Goal: Patient's chronic conditions and co-morbidity symptoms are monitored and maintained or improved  7/31/2025 2159 by Karina Knapp RN  Outcome: Progressing  7/31/2025 1224 by Gricelda Andre RN  Outcome: Progressing     Problem: Safety - Adult  Goal: Free from fall injury  7/31/2025 2159 by Karina Knapp RN  Outcome: Progressing  7/31/2025 1224 by Gricelda Andre RN  Outcome: Progressing     Problem: ABCDS Injury Assessment  Goal: Absence of physical injury  7/31/2025 2159 by Karina Knapp RN  Outcome: Progressing  7/31/2025 1224 by Gricelda Andre RN  Outcome: Progressing     Problem: Skin/Tissue Integrity  Goal: Skin integrity remains intact  Description: 1.  Monitor for areas of redness and/or skin breakdown  2.  Assess vascular access sites hourly  3.  Every 4-6 hours minimum:  Change oxygen saturation probe site  4.  Every 4-6 hours:  If on nasal continuous positive airway pressure, respiratory therapy assess nares and determine need for appliance change or resting period  7/31/2025 2159 by Karina Knapp RN  Outcome: Progressing  7/31/2025 1224 by Gricelda Andre RN  Outcome: Progressing     Problem: Nutrition Deficit:  Goal: Optimize nutritional status  7/31/2025 2159 by Karina Knapp RN  Outcome: Progressing  7/31/2025 1224 by Gricelda Andre RN  Outcome: Progressing     Problem: Pain  Goal: Verbalizes/displays adequate comfort level or baseline comfort level  7/31/2025 2159 by Karina Knapp RN  Outcome: Progressing  7/31/2025 1224 by Gricelda Andre RN  Outcome: Progressing     Problem: Neurosensory - Adult  Goal: Achieves stable or improved neurological status  7/31/2025 2159 by Karina Knapp RN  Outcome: Progressing  7/31/2025 
  Problem: Discharge Planning  Goal: Discharge to home or other facility with appropriate resources  Outcome: Progressing     Problem: Chronic Conditions and Co-morbidities  Goal: Patient's chronic conditions and co-morbidity symptoms are monitored and maintained or improved  Outcome: Progressing     Problem: Safety - Adult  Goal: Free from fall injury  Outcome: Progressing     Problem: ABCDS Injury Assessment  Goal: Absence of physical injury  Outcome: Progressing     Problem: Skin/Tissue Integrity  Goal: Skin integrity remains intact  Description: 1.  Monitor for areas of redness and/or skin breakdown  2.  Assess vascular access sites hourly  3.  Every 4-6 hours minimum:  Change oxygen saturation probe site  4.  Every 4-6 hours:  If on nasal continuous positive airway pressure, respiratory therapy assess nares and determine need for appliance change or resting period  Outcome: Progressing     
  Problem: Discharge Planning  Goal: Discharge to home or other facility with appropriate resources  Outcome: Progressing     Problem: Chronic Conditions and Co-morbidities  Goal: Patient's chronic conditions and co-morbidity symptoms are monitored and maintained or improved  Outcome: Progressing     Problem: Safety - Adult  Goal: Free from fall injury  Outcome: Progressing     Problem: ABCDS Injury Assessment  Goal: Absence of physical injury  Outcome: Progressing     Problem: Skin/Tissue Integrity  Goal: Skin integrity remains intact  Description: 1.  Monitor for areas of redness and/or skin breakdown  2.  Assess vascular access sites hourly  3.  Every 4-6 hours minimum:  Change oxygen saturation probe site  4.  Every 4-6 hours:  If on nasal continuous positive airway pressure, respiratory therapy assess nares and determine need for appliance change or resting period  Outcome: Progressing     Problem: Nutrition Deficit:  Goal: Optimize nutritional status  Outcome: Progressing     Problem: Pain  Goal: Verbalizes/displays adequate comfort level or baseline comfort level  Outcome: Progressing     Problem: Neurosensory - Adult  Goal: Achieves stable or improved neurological status  Outcome: Progressing  Goal: Achieves maximal functionality and self care  Outcome: Progressing     Problem: Respiratory - Adult  Goal: Achieves optimal ventilation and oxygenation  Outcome: Progressing     Problem: Cardiovascular - Adult  Goal: Maintains optimal cardiac output and hemodynamic stability  Outcome: Progressing     Problem: Anxiety  Goal: Will report anxiety at manageable levels  Description: INTERVENTIONS:  1. Administer medication as ordered  2. Teach and rehearse alternative coping skills  3. Provide emotional support with 1:1 interaction with staff  Outcome: Progressing     Problem: Coping  Goal: Pt/Family able to verbalize concerns and demonstrate effective coping strategies  Description: INTERVENTIONS:  1. Assist 
  Problem: Discharge Planning  Goal: Discharge to home or other facility with appropriate resources  Outcome: Progressing  Flowsheets (Taken 7/23/2025 1802)  Discharge to home or other facility with appropriate resources:   Identify barriers to discharge with patient and caregiver   Identify discharge learning needs (meds, wound care, etc)   Refer to discharge planning if patient needs post-hospital services based on physician order or complex needs related to functional status, cognitive ability or social support system     Problem: Chronic Conditions and Co-morbidities  Goal: Patient's chronic conditions and co-morbidity symptoms are monitored and maintained or improved  Outcome: Progressing     Problem: Safety - Adult  Goal: Free from fall injury  Outcome: Progressing     Problem: ABCDS Injury Assessment  Goal: Absence of physical injury  Outcome: Progressing     
  Problem: Discharge Planning  Goal: Discharge to home or other facility with appropriate resources  Outcome: Progressing  Flowsheets (Taken 7/24/2025 1518)  Discharge to home or other facility with appropriate resources: Identify barriers to discharge with patient and caregiver     Problem: Chronic Conditions and Co-morbidities  Goal: Patient's chronic conditions and co-morbidity symptoms are monitored and maintained or improved  Outcome: Progressing     Problem: Safety - Adult  Goal: Free from fall injury  Outcome: Progressing     Problem: ABCDS Injury Assessment  Goal: Absence of physical injury  Outcome: Progressing     Problem: Skin/Tissue Integrity  Goal: Skin integrity remains intact  Description: 1.  Monitor for areas of redness and/or skin breakdown  2.  Assess vascular access sites hourly  3.  Every 4-6 hours minimum:  Change oxygen saturation probe site  4.  Every 4-6 hours:  If on nasal continuous positive airway pressure, respiratory therapy assess nares and determine need for appliance change or resting period  Outcome: Progressing  Flowsheets (Taken 7/24/2025 1518)  Skin Integrity Remains Intact: Monitor for areas of redness and/or skin breakdown     
  Problem: Discharge Planning  Goal: Discharge to home or other facility with appropriate resources  Outcome: Progressing  Flowsheets (Taken 7/29/2025 0741)  Discharge to home or other facility with appropriate resources: Refer to discharge planning if patient needs post-hospital services based on physician order or complex needs related to functional status, cognitive ability or social support system     Problem: Chronic Conditions and Co-morbidities  Goal: Patient's chronic conditions and co-morbidity symptoms are monitored and maintained or improved  Outcome: Progressing  Flowsheets (Taken 7/29/2025 0741)  Care Plan - Patient's Chronic Conditions and Co-Morbidity Symptoms are Monitored and Maintained or Improved: Monitor and assess patient's chronic conditions and comorbid symptoms for stability, deterioration, or improvement     Problem: Safety - Adult  Goal: Free from fall injury  Outcome: Progressing     Problem: ABCDS Injury Assessment  Goal: Absence of physical injury  Outcome: Progressing     Problem: Skin/Tissue Integrity  Goal: Skin integrity remains intact  Description: 1.  Monitor for areas of redness and/or skin breakdown  2.  Assess vascular access sites hourly  3.  Every 4-6 hours minimum:  Change oxygen saturation probe site  4.  Every 4-6 hours:  If on nasal continuous positive airway pressure, respiratory therapy assess nares and determine need for appliance change or resting period  Outcome: Progressing  Flowsheets (Taken 7/29/2025 0729)  Skin Integrity Remains Intact: Monitor for areas of redness and/or skin breakdown     Problem: Pain  Goal: Verbalizes/displays adequate comfort level or baseline comfort level  Outcome: Progressing     Problem: Neurosensory - Adult  Goal: Achieves stable or improved neurological status  Outcome: Progressing  Flowsheets (Taken 7/29/2025 0741)  Achieves stable or improved neurological status: Assess for and report changes in neurological status  Goal: Achieves 
  Problem: Discharge Planning  Goal: Discharge to home or other facility with appropriate resources  Outcome: Progressing  Flowsheets (Taken 7/30/2025 0819)  Discharge to home or other facility with appropriate resources: Refer to discharge planning if patient needs post-hospital services based on physician order or complex needs related to functional status, cognitive ability or social support system     Problem: Chronic Conditions and Co-morbidities  Goal: Patient's chronic conditions and co-morbidity symptoms are monitored and maintained or improved  Outcome: Progressing  Flowsheets (Taken 7/30/2025 0819)  Care Plan - Patient's Chronic Conditions and Co-Morbidity Symptoms are Monitored and Maintained or Improved: Monitor and assess patient's chronic conditions and comorbid symptoms for stability, deterioration, or improvement     Problem: Safety - Adult  Goal: Free from fall injury  Outcome: Progressing     Problem: ABCDS Injury Assessment  Goal: Absence of physical injury  Outcome: Progressing     Problem: Skin/Tissue Integrity  Goal: Skin integrity remains intact  Description: 1.  Monitor for areas of redness and/or skin breakdown  2.  Assess vascular access sites hourly  3.  Every 4-6 hours minimum:  Change oxygen saturation probe site  4.  Every 4-6 hours:  If on nasal continuous positive airway pressure, respiratory therapy assess nares and determine need for appliance change or resting period  Outcome: Progressing  Flowsheets (Taken 7/30/2025 0708)  Skin Integrity Remains Intact: Monitor for areas of redness and/or skin breakdown     Problem: Pain  Goal: Verbalizes/displays adequate comfort level or baseline comfort level  Outcome: Progressing     Problem: Neurosensory - Adult  Goal: Achieves stable or improved neurological status  Outcome: Progressing  Flowsheets (Taken 7/30/2025 0819)  Achieves stable or improved neurological status: Assess for and report changes in neurological status  Goal: Achieves 
  Problem: Nutrition Deficit:  Goal: Optimize nutritional status  7/28/2025 1323 by Fabi Gallardo, MS, RD, LD  Outcome: Progressing  7/28/2025 1318 by Gricelda Andre, RN  Outcome: Progressing  Flowsheets (Taken 7/28/2025 1317 by Fabi Gallardo, MS, RD, LD)  Nutrient intake appropriate for improving, restoring, or maintaining nutritional needs:   Assess nutritional status and recommend course of action   Monitor oral intake, labs, and treatment plans   Recommend, monitor, and adjust tube feedings and TPN/PPN based on assessed needs     
  Problem: Safety - Adult  Goal: Free from fall injury  7/24/2025 1948 by Faraz Antoine, RN  Outcome: Progressing  7/24/2025 1521 by Maverick Daly, RN  Outcome: Progressing     
  Problem: Safety - Adult  Goal: Free from fall injury  7/28/2025 1848 by Faraz Antoine, RN  Outcome: Progressing  7/28/2025 1318 by Gricelda Andre, RN  Outcome: Progressing     
  Problem: Safety - Adult  Goal: Free from fall injury  7/29/2025 1845 by Faraz Antoine RN  Outcome: Progressing  7/29/2025 1123 by Laura Mondragon LPN  Outcome: Progressing     
  Problem: Safety - Adult  Goal: Free from fall injury  Outcome: Progressing     
  Problem: Safety - Adult  Goal: Free from fall injury  Outcome: Progressing     
[facility] INPATIENT REHAB TREATMENT PLAN      Corinne Nelson    : 1932  [id number]  MRN: [id number]   PHYSICIAN:  Jean Pierre Rizvi MD  Primary Problem    Patient Active Problem List   Diagnosis    Lumbar compression fracture, closed, initial encounter ([facility])    Severe malnutrition       Rehabilitation Diagnosis:     Lumbar compression fracture, closed, initial encounter ([facility]) [S32.000A]       ADMIT DATE:2025   CARE PLAN     NURSING:  Corinne Nelson while on this unit will:     [x] Be continent of bowel and bladder      [x] Have an adequate number of bowel movements   [] Urinate with no urinary retention >300ml in bladder   [] Complete bladder protocol with padilla removal   [] Maintain O2 SATs at ___%   [x] Have pain managed while on ARU        [] Be pain free by discharge    [x] Have no skin breakdown while on ARU   [] Have improved skin integrity via wound measurements   [] Have no signs/symptoms of infection at the wound site   [x] Be free from injury during hospitalization    [x] Complete education with patient/family with understanding demonstrated for:   [x] Adjustment    [] Other:   Nursing interventions may include bowel/bladder training, education for medical assistive devices, medication education, O2 saturation management, energy conservation, stress management techniques, fall prevention, alarms protocol, seating and positioning, skin/wound care, pressure relief instruction,dressing changes,  infection protection, DVT prophylaxis, and/or assistance with in room safety with transfers to bed, toilet, wheelchair, shower as well as bathroom activities and hygiene.     Patient/caregiver education for:   [x] Disease/sustained injury/management      [x] Medication Use   [] Surgical intervention   [x] Safety   [x] Body mechanics and or joint protection   [x] Health maintenance      [] Sexuality/Intimacy   [] Self-Image  [id number]  Bladder and Bowel Continence  Bladder Continence: Always 
status  8/1/2025 1026 by Gricelda Andre RN  Outcome: Adequate for Discharge  7/31/2025 2159 by Karina Knapp RN  Outcome: Progressing  Goal: Achieves maximal functionality and self care  8/1/2025 1026 by Gricelda Andre RN  Outcome: Adequate for Discharge  7/31/2025 2159 by Karina Knapp RN  Outcome: Progressing     Problem: Respiratory - Adult  Goal: Achieves optimal ventilation and oxygenation  8/1/2025 1026 by Gricelda Andre RN  Outcome: Adequate for Discharge  7/31/2025 2159 by Karina Knapp RN  Outcome: Progressing     Problem: Cardiovascular - Adult  Goal: Maintains optimal cardiac output and hemodynamic stability  8/1/2025 1026 by Gricelda Andre RN  Outcome: Adequate for Discharge  7/31/2025 2159 by Karina Knapp RN  Outcome: Progressing     Problem: Anxiety  Goal: Will report anxiety at manageable levels  Description: INTERVENTIONS:  1. Administer medication as ordered  2. Teach and rehearse alternative coping skills  3. Provide emotional support with 1:1 interaction with staff  8/1/2025 1026 by Gricelda Andre RN  Outcome: Adequate for Discharge  7/31/2025 2159 by Karina Knapp RN  Outcome: Progressing     Problem: Coping  Goal: Pt/Family able to verbalize concerns and demonstrate effective coping strategies  Description: INTERVENTIONS:  1. Assist patient/family to identify coping skills, available support systems and cultural and spiritual values  2. Provide emotional support, including active listening and acknowledgement of concerns of patient and caregivers  3. Reduce environmental stimuli, as able  4. Instruct patient/family in relaxation techniques, as appropriate  5. Assess for spiritual pain/suffering and initiate Spiritual Care, Psychosocial Clinical Specialist consults as needed  8/1/2025 1026 by Gricelda Andre RN  Outcome: Adequate for Discharge  7/31/2025 2159 by Karina Knapp RN  Outcome: Progressing     Problem: Change in Body Image  Goal: Pt/Family communicate acceptance of loss or

## 2025-08-01 NOTE — DISCHARGE SUMMARY
Rehab Discharge Summary     Patient Identification:  Corinne Nelson is a 89 y.o. female.  :  1932  Admit Date:  2025  Discharge date : 2025  Attending Provider: Arslan Varner DO     Account Number: [id number]                                   Admission Diagnoses:   Lumbar compression fracture, closed, initial encounter ([facility]) [S32.000A]    Discharge Diagnoses:  Principal Problem:    Lumbar compression fracture, closed, initial encounter ([facility])  Active Problems:    Severe malnutrition  Resolved Problems:    * No resolved hospital problems. *      Discharge Medications:    Current Discharge Medication List             Details   lidocaine 4 % external patch Place 1 patch onto the skin daily  Qty: 30 each, Refills: 0      HYDROcodone-acetaminophen (NORCO) 5-325 MG per tablet Take 1 tablet by mouth every 8 hours as needed for Pain for up to 30 days. Max Daily Amount: 3 tablets  Qty: 90 tablet, Refills: 0    Comments: Reduce doses taken as pain becomes manageable  Associated Diagnoses: Lumbar compression fracture, closed, initial encounter ([facility])      polyethylene glycol (GLYCOLAX) 17 g packet Take 1 packet by mouth daily as needed for Constipation  Qty: 30 packet, Refills: 0                Details   !! warfarin (COUMADIN) 2.5 MG tablet Take 1 tablet by mouth daily 1 tablet daily with 5 mg tablet total 7.5 mg daily  Qty: 30 tablet, Refills: 3      !! warfarin (COUMADIN) 5 MG tablet Take 1 tablet by mouth daily Take along with 2.5 mg daily, total 7.5 mg daily  Qty: 30 tablet, Refills: 0       !! - Potential duplicate medications found. Please discuss with provider.             Details   benzonatate (TESSALON) 100 MG capsule Take 1 capsule by mouth 3 times daily as needed for Cough      CALCIUM CARB-CHOLECALCIFEROL PO Take 1 tablet by mouth daily 600-200 MG-Unit      escitalopram (LEXAPRO) 20 MG tablet Take 1 tablet by mouth daily      fluticasone (FLONASE) 50 MCG/ACT nasal spray 2 sprays by Each Nostril

## 2025-08-01 NOTE — PROGRESS NOTES
07/24/25 1300   Restrictions/Precautions   Restrictions/Precautions Weight Bearing;Fall Risk;Surgical Protocols   Lower Extremity Weight Bearing Restrictions   Right Lower Extremity Weight Bearing Weight Bearing As Tolerated   Left Lower Extremity Weight Bearing Weight Bearing As Tolerated   Required Braces or Orthoses   Spinal Thoracic Lumbar Sacral Orthotics   Position Activity Restriction   Spinal Precautions No Bending/Lifting/Twisting (BLT)   General   Additional Pertinent Hx HTN, HLD, CHF, OSTEOPEROSIS, PACEMKER, AFIB, BRADYCARDIA   Diagnosis L2 COMPRESSION FX   General   General Comments In recliner; alert   Subjective   Subjective Pt agreeable to therapy   Pain   Pre-Pain 0   Post-Pain 2   Pain Location Back   Pain Descriptor Aching   Pain Interventions Rest  (pain patch)   Transfers   Sit to Stand Minimal Assistance  (BUE push off from recliner)   Stand to Sit Minimal Assistance  (BUE reach back to chair; quick to sit increased pain)   Bed to Chair Minimal assistance  (recliner to L to WC w/ RW)   Car Transfer Minimal Assistance  (WC <> Car w/ RW; Min A to initate stand and lower into car.)   Ambulation   Surface Level tile   Device Rolling Walker   Other Apparatus O2  (2L)   Assistance Contact guard assistance   Quality of Gait reciprocal; flexed posture; rushed pace   Gait Deviations Decreased step height;Decreased step length   Distance 100'   Comments Pt veer R; V/C to correct veer and to slow pace   PT Exercises   A/AROM Exercises Seated BLE add squeeze, LAQ, marching x 10   Activity Tolerance   Activity Tolerance Patient tolerated treatment well   Assessment   Assessment Pt demonstrated good sequencing and safety awareness w/ car transfer using RW. V/c to lower slowly w/ stand to sit due to previous mild LOB due to quick speed. Pt tolerated ambulation at 2L of O2 well w/o report of SOB.   Safety Devices   Type of Devices Gait belt;Left in chair  (Left w/ OT)   PT Individual Minutes   Time In 1300 
   07/25/25 1400   Restrictions/Precautions   Restrictions/Precautions Weight Bearing;Fall Risk;Surgical Protocols   Required Braces or Orthoses? Yes   Lower Extremity Weight Bearing Restrictions   Right Lower Extremity Weight Bearing Weight Bearing As Tolerated   Left Lower Extremity Weight Bearing Weight Bearing As Tolerated   Required Braces or Orthoses   Spinal Thoracic Lumbar Sacral Orthotics   Position Activity Restriction   Spinal Precautions No Bending/Lifting/Twisting (BLT)   General   Additional Pertinent Hx HTN, HLD, CHF, OSTEOPEROSIS, PACEMKER, AFIB, BRADYCARDIA   Diagnosis L2 COMPRESSION FX   General   General Comments In WC; Alert; Dropped off in PT gym by OT   Subjective   Subjective Pt agreeable to therapy   Transfers   Sit to Stand Minimal Assistance  (BUE push off from WC)   Stand to Sit Minimal Assistance  (RUE reach to WC)   Stand Pivot Transfers Minimal Assistance  (WC to R to Toilet; Min assist to initiate standing)   PT Exercises   A/AROM Exercises Seated BLE add squeeze, LAQ, marching x 10   Standing Open/Closed Kinetic Chain Exercises STS from WC x 5  (Improved w/ BUE push off CGA)   Activity Tolerance   Activity Tolerance Patient tolerated treatment well   Assessment   Assessment Pt demonstrates continued difficulty w/ STS initation. Pt requires several attemps to stand up. Altered to BUE push off from WC and improved overall performance. Pt requested to return to room to have bowel movement. Seated BLE exercises performed in room following bowel/bladder management.   Safety Devices   Type of Devices Call light within reach;Chair alarm in place;Left in chair;Gait belt  (TLSO on)   PT Individual Minutes   Time In 1345   Time Out 1430   Minutes 45     Electronically signed by KYLIE Colvin on 7/25/2025 at 3:18 PM   
   07/28/25 1408   Vitals   Pulse 72   Respirations 16   SpO2 96 %   O2 Device Nasal cannula   Ambulation   Surface Level tile   Device Rolling Walker   Other Apparatus O2  (1L)   Assistance Contact guard assistance   Quality of Gait reciprocal pattern, no LOB   Distance 200'   Stairs/Curb   Stairs? Yes   Stairs   # Steps  8   Stairs Height 4\"   Rails Right ascending   Assessment   Assessment continued with amb and stairs. pt tolerated increased steps well. required min v/c's to correct sequencing of stairs.   PT Individual Minutes   Time In 1400   Time Out 1430   Minutes 30   Oxygen Therapy   O2 Flow Rate (L/min) 1 L/min       
   07/29/25 1300   Restrictions/Precautions   Restrictions/Precautions Surgical Protocols;Fall Risk   Required Braces or Orthoses? Yes   Lower Extremity Weight Bearing Restrictions   Right Lower Extremity Weight Bearing Weight Bearing As Tolerated   Left Lower Extremity Weight Bearing Weight Bearing As Tolerated   Required Braces or Orthoses   Spinal Thoracic Lumbar Sacral Orthotics   Position Activity Restriction   Spinal Precautions No Bending/Lifting/Twisting (BLT)   General   Additional Pertinent Hx HTN, HLD, CHF, OSTEOPEROSIS, PACEMKER, AFIB, BRADYCARDIA   Diagnosis L2 COMPRESSION FX   General   General Comments Pt in bed; Alert   Subjective   Subjective Pt agreeable to therapy but requests to use bathroom first due to need for bowel movement   Vitals   Pulse 62   SpO2 94 %   Bed mobility   Supine to Sit Minimal assistance  (HHA by therapist; scoot and turn w/ HOB raised to L EOB)   Transfers   Sit to Stand Stand by assistance  (RUE push off WC)   Stand to Sit Stand by assistance  (RUE reach back to WC)   Ambulation   Surface Level tile   Device Rolling Walker   Other Apparatus Wheelchair follow   Assistance Contact guard assistance   Quality of Gait reciprocal pattern, no LOB, FFP  (v/c to slow pace)   Gait Deviations Slow Khushi;Decreased step length;Decreased step height   Distance 300'   Comments Multiple L/R turns; SpO2 remained above 90% w/o O2 via  nc   PT Exercises   Resistive Exercises Seated BLE yellow ball adductor squeeze;GTB hip abduction, PF/DF; manual resist hip ext, LAQ  x15   Activity Tolerance   Activity Tolerance Patient tolerated treatment well;Patient limited by endurance   Assessment   Assessment Pt tolerated increased ambulation well w/o use of O2. SpO2 remained above 90% throughout session. Pt did not report any SOB. Pt tolerated resistance exercises well.   Safety Devices   Type of Devices Left in chair;Gait belt  (Left w/ OT)   PT Individual Minutes   Time In 1300   Time Out 1345 
  Corinne Nelson  [id number]       07/26/25 0934 07/26/25 0935 07/26/25 0942   Restrictions/Precautions   Restrictions/Precautions Weight Bearing;Fall Risk;Surgical Protocols  --   --    Required Braces or Orthoses? Yes  --   --    Lower Extremity Weight Bearing Restrictions   Right Lower Extremity Weight Bearing Weight Bearing As Tolerated  --   --    Left Lower Extremity Weight Bearing Weight Bearing As Tolerated  --   --    Required Braces or Orthoses   Spinal Thoracic Lumbar Sacral Orthotics  --   --    Position Activity Restriction   Spinal Precautions No Bending/Lifting/Twisting (BLT)  --   --    General   Additional Pertinent Hx HTN, HLD, CHF, OSTEOPEROSIS, PACEMKER, AFIB, BRADYCARDIA  --   --    Diagnosis L2 COMPRESSION FX  --   --    Subjective   Subjective Pt agreeable to therapy  --   --    Pain   Pre-Pain  --  5  --    Pain Location  --  Back  --    Bed mobility   Rolling to Right  --  Stand by assistance  --    Supine to Sit  --  Contact guard assistance  --    Transfers   Sit to Stand  --  Contact guard assistance;Minimal Assistance  --    Stand to Sit  --  Contact guard assistance  --    Bed to Chair  --  Contact guard assistance  (Using RW)  --    Lateral Transfers  --  Contact guard assistance  (W/C to Recliner using RW)  --    Comment  --  Verbal cues at times for pushing from surface to stand and for reaching back for surface before sitting.  --    Ambulation   Surface  --   --  Level tile   Device  --   --  Rolling Walker   Other Apparatus  --   --  O2  (1L.  TLSO)   Assistance  --   --  Contact guard assistance   Quality of Gait  --   --  Reciprocal pattern.  Forward flexed posture.   Gait Deviations  --   --  Decreased step height;Decreased step length   Distance  --   --  100' x2   Comments  --   --  Incorporated turns.   Activity Tolerance   Activity Tolerance  --   --   --    Assessment   Assessment  --   --   --    Safety Devices   Type of Devices  --   --   --    PT Individual Minutes   Time 
  Name: Corinne Nelson  MRN: [id number]  Date of Service:  7/29/2025 07/29/25 0815   Restrictions/Precautions   Restrictions/Precautions Surgical Protocols;Fall Risk   Required Braces or Orthoses? Yes   Lower Extremity Weight Bearing Restrictions   Right Lower Extremity Weight Bearing Weight Bearing As Tolerated   Left Lower Extremity Weight Bearing Weight Bearing As Tolerated   Required Braces or Orthoses   Spinal Thoracic Lumbar Sacral Orthotics   Position Activity Restriction   Spinal Precautions No Bending/Lifting/Twisting (BLT)   General   Chart Reviewed Yes   Patient assessed for rehabilitation services? Yes   Additional Pertinent Hx HTN, HLD, CHF, OSTEOPEROSIS, PACEMKER, AFIB, BRADYCARDIA   Diagnosis L2 COMPRESSION FX   General   General Comments Pt requires some assistance to perform personal hygiene post BM (blood from possible hemorrhoids, nursing notified) and to bryson pants/shoes sitting EOB  (Pt requires assistance to bryson TLSO sitting EOB)   Subjective   Subjective Pt laying in bed, agrees to participate in therapy- requests to use BR first.   Vitals   O2 Device None (Room air)   Pain   Pre-Pain 2   Post-Pain 2   Pain Location Back   Bed mobility   Supine to Sit Stand by assistance   Scooting Stand by assistance   Bed Mobility Comments On R side of bed- intermittent use of bedrail   Transfers   Sit to Stand Stand by assistance;Contact guard assistance   Stand to Sit Stand by assistance;Contact guard assistance   Ambulation   WB Status WBAT   Ambulation   Surface Level tile   Device Rolling Walker   Assistance Contact guard assistance   Quality of Gait reciprocal pattern, no LOB, FFP   Gait Deviations Slow Khuhsi;Decreased step length;Decreased step height   Distance 10' X 3   Comments Multiple L/R turns, w/ shoes donned   Activity Tolerance   Activity Tolerance Patient tolerated treatment well;Patient limited by endurance   Assessment   Assessment Pt able to amb. w/ shoes w/  RW requiring CGA (10' X 
  Name: Corinne Nelson  MRN: [id number]  Date of Service:  7/30/2025 07/30/25 0815   Restrictions/Precautions   Restrictions/Precautions Surgical Protocols;Fall Risk   Required Braces or Orthoses? Yes   Lower Extremity Weight Bearing Restrictions   Right Lower Extremity Weight Bearing Weight Bearing As Tolerated   Left Lower Extremity Weight Bearing Weight Bearing As Tolerated   Required Braces or Orthoses   Spinal Thoracic Lumbar Sacral Orthotics   Position Activity Restriction   Spinal Precautions No Bending/Lifting/Twisting (BLT)   General   Chart Reviewed Yes   Patient assessed for rehabilitation services? Yes   Additional Pertinent Hx HTN, HLD, CHF, OSTEOPEROSIS, PACEMKER, AFIB, BRADYCARDIA   Diagnosis L2 COMPRESSION FX   Subjective   Subjective Pt sitting EOB, agrees to participate in therapy.  (Reports she is frustrated w/ thickened coffee, and she wants to go home.)   Pain   Pre-Pain 2   Post-Pain 2   Pain Location Back   Bed mobility   Rolling to Left Modified independent   Rolling to Right Modified independent   Supine to Sit Modified independent   Sit to Supine Modified independent   Scooting Modified independent   Bed Mobility Comments On L/R side of bed- intermittent use of bedrail   Transfers   Sit to Stand Stand by assistance   Stand to Sit Stand by assistance   Ambulation   WB Status WBAT   Ambulation   Surface Level tile   Device Rolling Walker   Assistance Supervision   Quality of Gait reciprocal pattern, no LOB, FFP   Gait Deviations Decreased step length;Decreased step height   Distance 10' X 2  (In room)   Comments Multiple L/R turns, w/ shoes donned   PT Exercises   Exercise Treatment Sitting in recliner BLE ther-ex: DF/PF X 30, LAQ X 15, hip flexion X 15   Activity Tolerance   Activity Tolerance Patient tolerated treatment well   Assessment   Assessment Pt cleared to be up AD ebony in room w/ RW from PT standpoint, has OT @ 11am.   Discharge Recommendations Continue to assess pending 
  Name: Corinne Nelson  MRN: [id number]  Date of Service:  8/1/2025 08/01/25 0815   Restrictions/Precautions   Restrictions/Precautions Surgical Protocols   Activity Level Up Ad Sanam   Lower Extremity Weight Bearing Restrictions   Right Lower Extremity Weight Bearing Weight Bearing As Tolerated   Left Lower Extremity Weight Bearing Weight Bearing As Tolerated   Required Braces or Orthoses   Spinal Lumbar Corset   Position Activity Restriction   Spinal Precautions No Bending/Lifting/Twisting (BLT)   General   Chart Reviewed Yes   Patient assessed for rehabilitation services? Yes   Additional Pertinent Hx HTN, HLD, CHF, OSTEOPEROSIS, PACEMKER, AFIB, BRADYCARDIA   Diagnosis L2 COMPRESSION FX   General   General Comments Pt able to sit on bed and independently bryson pants/shoes/LSO, does break bending precautions some.Pt able to perform personal hygiene indepenently on toilet and independently brush teeth/comb hair standing at RW.   Subjective   Subjective Pt laying in bed, agrees to participate in therapy.   Hearing   Hearing Impaired   Hearing Exceptions Hard of hearing/hearing concerns   Vitals   Pulse 60   SpO2 93 %   O2 Device None (Room air)   Pain   Pre-Pain 1   Post-Pain 1   Pain Location Back  (Back and B groins)   Bed mobility   Supine to Sit Modified independent   Scooting Modified independent   Bed Mobility Comments On R side of bed- intermittent use of bedrail   Transfers   Sit to Stand Supervision;Modified independent   Stand to Sit Supervision;Modified independent   Ambulation   WB Status WBAT   Ambulation   Surface Level tile   Device Rolling Walker   Assistance Supervision;Modified Independent   Quality of Gait step to pattern to reciprocal pattern, no LOB, FFP   Gait Deviations Decreased step length;Decreased step height   Distance 80'+  (In room and hallway)   Comments Multiple L/R turns, w/ shoes donned   PT Exercises   Exercise Treatment Standing at hallway handrail w/ alternating UE support: fwd 
  Name: Corinne Nelson  MRN: [id number]  Date of Service:  7/25/2025 07/25/25 1000   Restrictions/Precautions   Restrictions/Precautions Weight Bearing;Fall Risk;Surgical Protocols   Required Braces or Orthoses? Yes   Lower Extremity Weight Bearing Restrictions   Right Lower Extremity Weight Bearing Weight Bearing As Tolerated   Left Lower Extremity Weight Bearing Weight Bearing As Tolerated   Required Braces or Orthoses   Spinal Thoracic Lumbar Sacral Orthotics   Position Activity Restriction   Spinal Precautions No Bending/Lifting/Twisting (BLT)   General   Chart Reviewed Yes   Patient assessed for rehabilitation services? Yes   Additional Pertinent Hx HTN, HLD, CHF, OSTEOPEROSIS, PACEMKER, AFIB, BRADYCARDIA   Diagnosis L2 COMPRESSION FX   Subjective   Subjective Pt sitting in recliner, agrees to participate in therapy.   Vitals   Pulse 63   SpO2 96 %   O2 Device Nasal cannula   Comment 1L   Transfers   Sit to Stand Minimal Assistance   Stand to Sit Minimal Assistance   Ambulation   Surface Level tile   Device Rolling Walker   Other Apparatus O2   Assistance Contact guard assistance   Quality of Gait reciprocal; flexed posture; rushed pace   Gait Deviations Decreased step height;Decreased step length   Distance 10' X 2   Comments Multiple L/R turns, w/ socks donned   PT Exercises   Exercise Treatment Sitting BLE ther-ex in w/c: DF/PF X 20, LAQ X 15, hip flexion X 10   Activity Tolerance   Activity Tolerance Patient tolerated treatment well;Patient limited by endurance   Assessment   Assessment Pt reports multiple falls (unsure over how long), she reports her sister falls sometimes too (pt is 93 and her sister is 95). She feels as if most of her falls are due to her poor balance. Her son lives close by, yet pt/sister live alone.  FTD would be very beneficial.   Discharge Recommendations Continue to assess pending progress   Education   Education Given To Patient   Education Provided Safety   Safety Devices   Type 
  Name: Corinne Nelson  MRN: [id number]  Date of Service:  7/31/2025 07/31/25 1300   Restrictions/Precautions   Restrictions/Precautions Surgical Protocols   Activity Level Up Ad Sanam   Lower Extremity Weight Bearing Restrictions   Right Lower Extremity Weight Bearing Weight Bearing As Tolerated   Left Lower Extremity Weight Bearing Weight Bearing As Tolerated   Required Braces or Orthoses   Spinal Lumbar Corset   Position Activity Restriction   Spinal Precautions No Bending/Lifting/Twisting (BLT)   General   Chart Reviewed Yes   Patient assessed for rehabilitation services? Yes   Additional Pertinent Hx HTN, HLD, CHF, OSTEOPEROSIS, PACEMKER, AFIB, BRADYCARDIA   Diagnosis L2 COMPRESSION FX   General   General Comments Pt requires v/c's to readjust LSO (around breasts)   Subjective   Subjective Pt sitting in recliner agrees to participate in therapy.   Hearing   Hearing Impaired   Hearing Exceptions Hard of hearing/hearing concerns   Vitals   Pulse 60   SpO2 91 %  (91-94)   O2 Device None (Room air)   Pain   Pre-Pain 1   Post-Pain 1   Pain Location Back  (And groins)   Bed mobility   Sit to Supine Modified independent   Scooting Modified independent  (For up in bed some- using overhead rails + BLE flat on bed)   Bed Mobility Comments On R side of bed- intermittent use of bedrail   Transfers   Sit to Stand Supervision;Modified independent   Stand to Sit Supervision;Modified independent   Car Transfer Contact guard assistance  (Raised height (son car- SUV))   Ambulation   WB Status WBAT   Ambulation   Surface Level tile   Device Rolling Walker   Assistance Supervision;Modified Independent   Quality of Gait step to pattern to reciprocal pattern, no LOB, FFP   Gait Deviations Decreased step length;Decreased step height   Distance 10' X 5   Stairs/Curb   Stairs? Yes   Stairs   # Steps  3   Stairs Height 6\"   Rails Left ascending  (L asc/L desc)   Assistance Stand by assistance;Contact guard assistance   Comment sideways so 
  Name: Corinne Nelson  MRN: [id number]  Date of Service:  8/1/2025 08/01/25 1300   Restrictions/Precautions   Restrictions/Precautions Surgical Protocols   Activity Level Up Ad Sanam   Lower Extremity Weight Bearing Restrictions   Right Lower Extremity Weight Bearing Weight Bearing As Tolerated   Left Lower Extremity Weight Bearing Weight Bearing As Tolerated   Required Braces or Orthoses   Spinal Lumbar Corset   Position Activity Restriction   Spinal Precautions No Bending/Lifting/Twisting (BLT)   General   Chart Reviewed Yes   Patient assessed for rehabilitation services? Yes   Additional Pertinent Hx HTN, HLD, CHF, OSTEOPEROSIS, PACEMKER, AFIB, BRADYCARDIA   Diagnosis L2 COMPRESSION FX   Subjective   Subjective Pt sitting in recliner, agrees to participate in therapy.   Pain   Pre-Pain 1   Post-Pain 1   Pain Location Back  (Back and groins)   Transfers   Sit to Stand Supervision;Modified independent   Stand to Sit Supervision;Modified independent   Car Transfer Contact guard assistance;Stand by assistance  (W/ rollator: Raised height)   Ambulation   WB Status WBAT   Ambulation   Surface Level tile   Device Rolling Walker   Assistance Supervision;Modified Independent   Quality of Gait step to pattern to reciprocal pattern, no LOB, FFP   Gait Deviations Decreased step length;Decreased step height   Distance 10'   Comments Multiple L/R turns, w/ shoes donned   More Ambulation? Yes   Ambulation 2   Surface - 2 level tile   Device 2 Rollator   Assistance 2 Stand by assistance;Contact guard assistance   Quality of Gait 2 same as previous: faster juan when not cued to slow down   Gait Deviations Decreased step length;Decreased step height   Distance 30'+   Comments Multiple L/R turns, w/ shoes donned  (Pt reports she mostly uses rollator at home)   Stairs/Curb   Stairs? Yes   Stairs   # Steps  3   Stairs Height 6\"   Rails Left ascending  (L asc/L desc)   Assistance Contact guard assistance;Stand by assistance 
  Name: Corinne Nelson  MRN: [id number]  Date of Service:  7/30/2025 07/30/25 1300   Restrictions/Precautions   Restrictions/Precautions Surgical Protocols   Activity Level Up Ad Sanam   Required Braces or Orthoses? Yes   Lower Extremity Weight Bearing Restrictions   Right Lower Extremity Weight Bearing Weight Bearing As Tolerated   Left Lower Extremity Weight Bearing Weight Bearing As Tolerated   Required Braces or Orthoses   Spinal Thoracic Lumbar Sacral Orthotics   General   Chart Reviewed Yes   Patient assessed for rehabilitation services? Yes   Additional Pertinent Hx HTN, HLD, CHF, OSTEOPEROSIS, PACEMKER, AFIB, BRADYCARDIA   Diagnosis L2 COMPRESSION FX   Subjective   Subjective Pt sitting in recliner agrees to participate in therapy.   Hearing   Hearing Impaired   Hearing Exceptions Hard of hearing/hearing concerns   Vitals   O2 Device None (Room air)   Pain   Pre-Pain 1   Post-Pain 1   Pain Location Back   Transfers   Sit to Stand Supervision   Stand to Sit Supervision   Ambulation   WB Status WBAT   Ambulation   Surface Level tile   Device Rolling Walker   Assistance Supervision   Quality of Gait reciprocal pattern, no LOB, FFP   Gait Deviations Decreased step length;Decreased step height   Distance 15' X 2  (LSO already donned, v.c's and assistance to readjust)   Comments Multiple L/R turns, w/ shoes donned   Stairs/Curb   Stairs? Yes   Stairs   # Steps  6  (3 X 2)   Stairs Height 4\"   Rails Bilateral;Left ascending  (1X w/ B handrails and 1X sideways w/ BUE support on L asc handrail)   Assistance Stand by assistance;Contact guard assistance   Comment step to pattern leading w/ LLE during asc and RLE during desc w/ B rails, and sideways so BUE support w/ L asc rail  (Pt practiced/educated on variations of stair training if d/c to personal home or to son's home)   Activity Tolerance   Activity Tolerance Patient tolerated treatment well   Assessment   Assessment Pt able to tolerate overall tx w/o c/o of 
  Name: Corinne Nelson  MRN: [id number]  Date of Service:  7/31/2025 07/31/25 0815   Restrictions/Precautions   Restrictions/Precautions Surgical Protocols   Activity Level Up Ad Sanam   Lower Extremity Weight Bearing Restrictions   Right Lower Extremity Weight Bearing Weight Bearing As Tolerated   Left Lower Extremity Weight Bearing Weight Bearing As Tolerated   Required Braces or Orthoses   Spinal Lumbar Corset   Position Activity Restriction   Spinal Precautions No Bending/Lifting/Twisting (BLT)   General   Chart Reviewed Yes   Patient assessed for rehabilitation services? Yes   Additional Pertinent Hx HTN, HLD, CHF, OSTEOPEROSIS, PACEMKER, AFIB, BRADYCARDIA   Diagnosis L2 COMPRESSION FX   General   General Comments Pt sitting in recliner w/ O2 donned + purewick w/o LSO. Pt was told her O2 dropped to ~83% at night.  (Pt able to sit/stand at RW in BR and mostly pperform wash up and dress- some set up required (bending some by hersel. and w/o v/c's). Pt then able to stand at sink w/ RW to brush teeth and comb hair.)   Subjective   Subjective Pt sitting in recliner agrees to participate in therapy.   Hearing   Hearing Impaired   Hearing Exceptions Hard of hearing/hearing concerns   Vitals   Pulse 61   SpO2 92 %  (92-94)   O2 Device None (Room air)   Pain   Pre-Pain 1  (Pt does report her B legs \"cramp\" intermittently and her B groin areas hurt intermittently as well)   Post-Pain 1   Pain Location Back   Transfers   Sit to Stand Supervision;Modified independent   Stand to Sit Supervision;Modified independent   Ambulation   WB Status WBAT   Ambulation   Surface Level tile   Device Rolling Walker   Assistance Supervision;Modified Independent   Quality of Gait step to pattern to reciprocal pattern, no LOB, FFP   Gait Deviations Decreased step length;Decreased step height   Distance 40', 200'  (In room and hallway)   Comments Multiple L/R turns, w/ shoes donned   Activity Tolerance   Activity Tolerance Patient 
4 Eyes Skin Assessment     NAME:  Corinne Nelson  YOB: 1932  MEDICAL RECORD NUMBER:  [id number]    The patient is being assessed for  Admission    I agree that at least one RN has performed a thorough Head to Toe Skin Assessment on the patient. ALL assessment sites listed below have been assessed.      Areas assessed by both nurses:    Head, Face, Ears, Shoulders, Back, Chest, and Arms, Elbows, Hands        Does the Patient have a Wound? No noted wound(s)       Stephen Prevention initiated by RN: No  Wound Care Orders initiated by RN: No    For hospital-acquired stage 1 & 2 and ALL Stage 3,4, Unstageable, DTI, NWPT, and Complex wounds: place order “IP Wound Care/Ostomy Nurse Eval and Treat” by RN under : No    New Ostomies, if present place, Ostomy referral order under : No     Nurse 1 eSignature: Electronically signed by Maverick Daly RN on 7/23/25 at 6:37 PM CDT    **SHARE this note so that the co-signing nurse can place an eSignature**    Nurse 2 eSignature: {Esignature:[id number]}   
7Facility/Department: [facility] 8 REHAB UNIT  Occupational Therapy     Name: Corinne Nelson  : 1932  MRN: [id number]  Date of Service: 2025    Discharge Recommendations:  Home with assist PRN, Home with Home health OT    Patient Diagnosis(es): There were no encounter diagnoses.  Past Medical History:  has a past medical history of Cancer (HCC), CHF (congestive heart failure) (HCC), GERD (gastroesophageal reflux disease), and Hypertension.  Past Surgical History:  has a past surgical history that includes Pacemaker insertion (Left); Mastectomy (Bilateral); Breast surgery; Appendectomy; and Hysterectomy.    Treatment Diagnosis: L2 compression fracture    Assessment   Performance deficits / Impairments: Decreased functional mobility ;Decreased strength;Decreased endurance;Decreased high-level IADLs  Treatment Diagnosis: L2 compression fracture  Prognosis: Good  Activity Tolerance  Activity Tolerance: Patient Tolerated treatment well     Plan   Occupational Therapy Plan  Current Treatment Recommendations: Strengthening, Functional mobility training, Endurance training, Safety education & training, Patient/Caregiver education & training, Equipment evaluation, education, & procurement, Home management training     Restrictions  Restrictions/Precautions  Restrictions/Precautions: Surgical Protocols  Activity Level: Up Ad Sanam  Required Braces or Orthoses?: Yes  Lower Extremity Weight Bearing Restrictions  Right Lower Extremity Weight Bearing: Weight Bearing As Tolerated  Left Lower Extremity Weight Bearing: Weight Bearing As Tolerated  Required Braces or Orthoses  Spinal: Lumbar Corset  Position Activity Restriction  Spinal Precautions: No Bending/Lifting/Twisting (BLT)    Subjective   General  Chart Reviewed: Yes  Patient assessed for rehabilitation services?: Yes  Additional Pertinent Hx: Paroxysmal atrial fibrillation, Chronic diastolic heart failure, Hypertension, Hyperlipidemia, GERD  Diagnosis: L2 compression 
Clinical Pharmacy Note    Corinne Nelson is a 89 y.o. female for whom pharmacy has been asked to manage warfarin therapy.     Reason for Admission: Fall    Consulting Physician: Dr Rizvi  Warfarin dose prior to admission: 7.5 mg Sun, Mon, Tue, Wed, Thurs, Fri; 5 mg Sat  Warfarin indication: paroxysmal afib  Target INR range: 2-3   Outpatient warfarin provider: N/A    Past Medical History:   Diagnosis Date    Cancer (HCC)     breast    CHF (congestive heart failure) (HCC)     GERD (gastroesophageal reflux disease)     Hypertension                 Recent Labs     07/24/25  0730   INR 1.66*     Recent Labs     07/24/25  0730   HGB 10.4*   HCT 32.0*          Current warfarin drug-drug interactions: N/A        Date INR Warfarin Dose   07/24/25 1.66 7.5 mg                                   Give warfarin 7.5 mg PO x 1 dose today.    Daily PT/INR until stable within therapeutic range.     Thank you for the consult.     Electronically signed by Jamal Azevedo RPH on 7/24/2025 at 11:08 AM   
Clinical Pharmacy Note    Warfarin consult follow-up    Recent Labs     07/25/25  0522   INR 1.39*     Recent Labs     07/24/25  0730   HGB 10.4*   HCT 32.0*          Significant Drug-Drug Interactions: none         Date INR Warfarin Dose   07/24/25 1.66 7.5 mg    07/25/25  1.39 10 mg                                               Notes:                   Give warfarin 10mg PO x 1 dose   Daily PT/INR until stable within therapeutic range.     Electronically signed by Laina Barajas RPH on 7/25/2025 at 10:29 AM      
Clinical Pharmacy Note    Warfarin consult follow-up    Recent Labs     07/26/25  0544   INR 1.36*     Recent Labs     07/24/25  0730   HGB 10.4*   HCT 32.0*          Significant Drug-Drug Interactions:  New warfarin drug-drug interactions: None  Discontinued drug-drug interactions: None    Date INR Warfarin Dose   07/24/25 1.66 7.5 mg    07/25/25  1.39 10 mg     07/26/25 1.36  7.5 mg                                        Notes:    Give Coumadin  7.5 mg po x 1 dose.    Daily PT/INR until stable within therapeutic range.     Electronically signed by Jo-Ann Jerez RPh, BCPS, 7/26/2025,2:47 PM     
Clinical Pharmacy Note    Warfarin consult follow-up    Recent Labs     07/27/25  0533   INR 1.46*     No results for input(s): \"HGB\", \"HCT\", \"PLT\" in the last 72 hours.    Significant Drug-Drug Interactions:  New warfarin drug-drug interactions: None  Discontinued drug-drug interactions: None    Date INR Warfarin Dose   07/24/25 1.66 7.5 mg    07/25/25  1.39 10 mg     07/26/25 1.36  7.5 mg     07/27/25 1.46   7.5 mg                               Notes:     Give Coumadin 7.5 mg po x 1 dose.               Daily PT/INR until stable within therapeutic range.       Electronically signed by Jo-Ann Jerez RPh, BCPS, 7/27/2025,2:04 PM      
Clinical Pharmacy Note    Warfarin consult follow-up    Recent Labs     07/28/25  0354   INR 1.57*     Recent Labs     07/28/25  0354   HGB 10.3*   HCT 32.3*          Significant Drug-Drug Interactions:  Current warfarin drug-drug interactions:     Escitalopram - Antidepressants with Antiplatelet Effects may increase anticoagulant effects of Vitamin K Antagonists.      Discontinued drug-drug interactions: None    Date INR Warfarin Dose   07/24/25 1.66 7.5 mg   07/25/25 1.39 10 mg    07/26/25 1.36  7.5 mg    07/27/25 1.46   7.5 mg   07/28/25 1.57  10 mg                      Notes: Give Warfarin 10 mg po x 1 tonight                     Daily PT/INR until stable within therapeutic range.     Electronically signed by Anabell Mccray [facility] on 7/28/2025 at 2:07 PM      
Clinical Pharmacy Note    Warfarin consult follow-up    Recent Labs     07/29/25  0353   INR 1.71*     Recent Labs     07/28/25  0354   HGB 10.3*   HCT 32.3*          Significant Drug-Drug Interactions:  Current warfarin drug-drug interactions:      Escitalopram - Antidepressants with Antiplatelet Effects may increase anticoagulant effects of Vitamin K Antagonists.       Discontinued drug-drug interactions: None     Date INR Warfarin Dose   07/24/25 1.66 7.5 mg   07/25/25 1.39 10 mg    07/26/25 1.36  7.5 mg    07/27/25 1.46   7.5 mg   07/28/25 1.57  10 mg     07/29/25  1.71 10 mg                         Notes: Give Warfarin 10 mg po x 1 tonight      Daily PT/INR until stable within therapeutic range.     Electronically signed by Laina Barajas RPH on 7/29/2025 at 11:28 AM      
Clinical Pharmacy Note    Warfarin consult follow-up    Recent Labs     07/30/25  0432   INR 1.98*     Recent Labs     07/28/25  0354 07/30/25  0432   HGB 10.3* 10.6*   HCT 32.3* 32.6*    238         Significant Drug-Drug Interactions:  Current warfarin drug-drug interactions:      Escitalopram - Antidepressants with Antiplatelet Effects may increase anticoagulant effects of Vitamin K Antagonists.       Discontinued drug-drug interactions: None     Date INR Warfarin Dose   07/24/25 1.66 7.5 mg   07/25/25 1.39 10 mg    07/26/25 1.36  7.5 mg    07/27/25 1.46   7.5 mg   07/28/25 1.57  10 mg     07/29/25  1.71 10 mg     07/30/25 1.98  10 mg                         Notes:                   Give warfarin 10mg PO x1 dose today  Daily PT/INR until stable within therapeutic range.     Electronically signed by Laina Barajas RPH on 7/30/2025 at 11:43 AM     
Clinical Pharmacy Note    Warfarin consult follow-up    Recent Labs     07/31/25  0457   INR 2.04*     Recent Labs     07/30/25  0432   HGB 10.6*   HCT 32.6*          Significant Drug-Drug Interactions:  Current warfarin drug-drug interactions:      Escitalopram - Antidepressants with Antiplatelet Effects may increase anticoagulant effects of Vitamin K Antagonists.       Discontinued drug-drug interactions: None    Date INR Warfarin Dose   07/24/25 1.66 7.5 mg   07/25/25 1.39 10 mg    07/26/25 1.36  7.5 mg    07/27/25 1.46   7.5 mg   07/28/25 1.57  10 mg     07/29/25  1.71 10 mg     07/30/25 1.98  10 mg    07/31/25   2.04  10 mg               Notes: Give Warfarin 10 mg x 1 dose today              Daily PT/INR until stable within therapeutic range.     Electronically signed by Gricelda Tobias RPH on 7/31/2025 at 12:11 PM     
Clinical Pharmacy Note    Warfarin consult follow-up    Recent Labs     08/01/25  0431   INR 2.38*     Recent Labs     07/30/25  0432 08/01/25  0431   HGB 10.6* 11.2*   HCT 32.6* 34.2*    268     Significant Drug-Drug Interactions:  Current warfarin drug-drug interactions:      Escitalopram - Antidepressants with Antiplatelet Effects may increase anticoagulant effects of Vitamin K Antagonists.       Discontinued drug-drug interactions: None     Date INR Warfarin Dose   07/24/25 1.66 7.5 mg   07/25/25 1.39 10 mg    07/26/25 1.36  7.5 mg    07/27/25 1.46   7.5 mg   07/28/25 1.57  10 mg     07/29/25  1.71 10 mg     07/30/25 1.98  10 mg    07/31/25   2.04  10 mg    08/01/25  2.38                     7.5 mg            Notes:     Give 7.5 mg x 1 today.  (Patient may need to have 7.5 mg five days a week & 10 mg two days a week as outpatient)                Daily PT/INR until stable within therapeutic range.     Electronically signed by Kiarra Prater RPH on 8/1/2025 at 8:03 AM      
Corinne Nelson  [id number]     07/26/25 1352 07/26/25 1353 07/26/25 1412   Restrictions/Precautions   Restrictions/Precautions Surgical Protocols;Fall Risk  --   --    Required Braces or Orthoses? Yes  --   --    Lower Extremity Weight Bearing Restrictions   Right Lower Extremity Weight Bearing Weight Bearing As Tolerated  --   --    Left Lower Extremity Weight Bearing Weight Bearing As Tolerated  --   --    Required Braces or Orthoses   Spinal Thoracic Lumbar Sacral Orthotics  --   --    Position Activity Restriction   Spinal Precautions No Bending/Lifting/Twisting (BLT)  --   --    General   Chart Reviewed  --  Yes  --    Additional Pertinent Hx  --  HTN, HLD, CHF, OSTEOPEROSIS, PACEMKER, AFIB, BRADYCARDIA  --    Diagnosis  --  L2 COMPRESSION FX  --    Subjective   Subjective  --  Pt agreeable to therapy this afternoon.  --    Transfers   Sit to Stand  --   --  Contact guard assistance   Stand to Sit  --   --  Contact guard assistance   Ambulation   WB Status  --   --  WBAT   Ambulation   Surface  --   --  Level tile   Device  --   --  Rolling Walker   Other Apparatus  --   --  O2  (1L TLSO)   Assistance  --   --  Contact guard assistance   Quality of Gait  --   --  Reciprocal pattern.  Forward flexed posture.   Gait Deviations  --   --  Decreased step height;Decreased step length   Distance  --   --  200', 20'   Comments  --   --  Incorporated turns.   PT Exercises   Exercise Treatment  --   --   --    Assessment   Assessment  --   --   --    PT Individual Minutes   Time In  --   --   --    Time Out  --   --   --    Minutes  --   --   --       07/26/25 1413 07/26/25 1414 07/26/25 1431   Restrictions/Precautions   Restrictions/Precautions  --   --   --    Required Braces or Orthoses?  --   --   --    Lower Extremity Weight Bearing Restrictions   Right Lower Extremity Weight Bearing  --   --   --    Left Lower Extremity Weight Bearing  --   --   --    Required Braces or Orthoses   Spinal  --   --   --    Position 
Corinne Nelson arrived to room # 824  Presented with: L2 compression fracture  Mental Status: Patient is oriented and alert.   Vitals:    07/23/25 1730   BP: (!) 172/56   Pulse: 61   Resp: 16   Temp: 98.5 °F (36.9 °C)   SpO2: 90%     Patient safety contract and falls prevention contract reviewed with patient Yes.  Oriented Patient to room.  Call light within reach. Yes.  Needs, issues or concerns expressed at this time: no.      Electronically signed by Laura Magana LPN on 7/23/2025 at 5:53 PM  
Facility/Department: [facility] 8 REHAB UNIT  Occupational Therapy     Name: Corinne Nelson  : 1932  MRN: [id number]  Date of Service: 2025    Discharge Recommendations:  Home with assist PRN, Home with Home health OT    Patient Diagnosis(es): There were no encounter diagnoses.  Past Medical History:  has a past medical history of Cancer (HCC), CHF (congestive heart failure) (HCC), GERD (gastroesophageal reflux disease), and Hypertension.  Past Surgical History:  has a past surgical history that includes Pacemaker insertion (Left); Mastectomy (Bilateral); Breast surgery; Appendectomy; and Hysterectomy.    Treatment Diagnosis: L2 compression fracture    Assessment   Performance deficits / Impairments: Decreased functional mobility ;Decreased ADL status;Decreased strength;Decreased endurance;Decreased balance;Decreased high-level IADLs  Treatment Diagnosis: L2 compression fracture  Prognosis: Good  Activity Tolerance  Activity Tolerance: Patient Tolerated treatment well     Plan   Occupational Therapy Plan  Current Treatment Recommendations: Strengthening, Balance training, Functional mobility training, Endurance training, Pain management, Safety education & training, Patient/Caregiver education & training, Equipment evaluation, education, & procurement, Positioning, Self-Care / ADL, Home management training     Restrictions  Restrictions/Precautions  Restrictions/Precautions: Surgical Protocols, Fall Risk  Required Braces or Orthoses?: Yes  Lower Extremity Weight Bearing Restrictions  Right Lower Extremity Weight Bearing: Weight Bearing As Tolerated  Left Lower Extremity Weight Bearing: Weight Bearing As Tolerated  Required Braces or Orthoses  Spinal: Thoracic Lumbar Sacral Orthotics  Position Activity Restriction  Spinal Precautions: No Bending/Lifting/Twisting (BLT)    Subjective   General  Chart Reviewed: Yes  Patient assessed for rehabilitation services?: Yes  Additional Pertinent Hx: Paroxysmal atrial 
Facility/Department: [facility] 8 REHAB UNIT  Occupational Therapy     Name: Corinne Nelson  : 1932  MRN: [id number]  Date of Service: 2025    Discharge Recommendations:  Home with assist PRN, Home with Home health OT    Patient Diagnosis(es): The encounter diagnosis was Lumbar compression fracture, closed, initial encounter (HCC).  Past Medical History:  has a past medical history of Cancer (HCC), CHF (congestive heart failure) (HCC), GERD (gastroesophageal reflux disease), and Hypertension.  Past Surgical History:  has a past surgical history that includes Pacemaker insertion (Left); Mastectomy (Bilateral); Breast surgery; Appendectomy; and Hysterectomy.    Treatment Diagnosis: L2 compression fracture    Assessment   Performance deficits / Impairments: Decreased functional mobility ;Decreased strength;Decreased endurance;Decreased high-level IADLs  Treatment Diagnosis: L2 compression fracture  Prognosis: Good  Activity Tolerance  Activity Tolerance: Patient Tolerated treatment well     Plan   Occupational Therapy Plan  Current Treatment Recommendations: Strengthening, Functional mobility training, Endurance training, Safety education & training, Patient/Caregiver education & training, Equipment evaluation, education, & procurement, Home management training     Restrictions  Restrictions/Precautions  Restrictions/Precautions: Surgical Protocols  Activity Level: Up Ad Sanam  Required Braces or Orthoses?: Yes  Lower Extremity Weight Bearing Restrictions  Right Lower Extremity Weight Bearing: Weight Bearing As Tolerated  Left Lower Extremity Weight Bearing: Weight Bearing As Tolerated  Required Braces or Orthoses  Spinal: Lumbar Corset  Position Activity Restriction  Spinal Precautions: No Bending/Lifting/Twisting (BLT)    Subjective   General  Chart Reviewed: Yes  Patient assessed for rehabilitation services?: Yes  Additional Pertinent Hx: Paroxysmal atrial fibrillation, Chronic diastolic heart failure, Hypertension, 
Facility/Department: [facility] 8 REHAB UNIT  Occupational Therapy     Name: Corinne Nelson  : 1932  MRN: [id number]  Date of Service: 2025    Discharge Recommendations:  Home with assist PRN, Home with Home health OT     Patient Diagnosis(es): There were no encounter diagnoses.  Past Medical History:  has a past medical history of Cancer (HCC), CHF (congestive heart failure) (HCC), GERD (gastroesophageal reflux disease), and Hypertension.  Past Surgical History:  has a past surgical history that includes Pacemaker insertion (Left); Mastectomy (Bilateral); Breast surgery; Appendectomy; and Hysterectomy.    Treatment Diagnosis: L2 compression fracture      Assessment   Performance deficits / Impairments: Decreased functional mobility ;Decreased ADL status;Decreased strength;Decreased endurance;Decreased balance;Decreased high-level IADLs  Assessment: Pt tolerated tx well, pt O2 at 93%-96% at rest, decreased to 88% post two bouts of ambulation on room air.  Treatment Diagnosis: L2 compression fracture  Prognosis: Good  Activity Tolerance  Activity Tolerance: Patient Tolerated treatment well            Plan   Occupational Therapy Plan  Current Treatment Recommendations: Strengthening, Balance training, Functional mobility training, Endurance training, Pain management, Safety education & training, Patient/Caregiver education & training, Equipment evaluation, education, & procurement, Positioning, Self-Care / ADL, Home management training     Restrictions  Restrictions/Precautions  Restrictions/Precautions: Surgical Protocols, Fall Risk  Required Braces or Orthoses?: Yes  Lower Extremity Weight Bearing Restrictions  Right Lower Extremity Weight Bearing: Weight Bearing As Tolerated  Left Lower Extremity Weight Bearing: Weight Bearing As Tolerated  Required Braces or Orthoses  Spinal: Thoracic Lumbar Sacral Orthotics  Position Activity Restriction  Spinal Precautions: No Bending/Lifting/Twisting (BLT)    Subjective 
Facility/Department: [facility] 8 REHAB UNIT  Occupational Therapy     Name: Corinne Nelson  : 1932  MRN: [id number]  Date of Service: 2025    Discharge Recommendations:  Home with assist PRN, Home with Home health OT  OT Equipment Recommendations  Other: Pt son will be purchasing a TTB, pt owns rollator, RW, and BSC.     Patient Diagnosis(es): The encounter diagnosis was Lumbar compression fracture, closed, initial encounter (HCC).  Past Medical History:  has a past medical history of Cancer (HCC), CHF (congestive heart failure) (HCC), GERD (gastroesophageal reflux disease), and Hypertension.  Past Surgical History:  has a past surgical history that includes Pacemaker insertion (Left); Mastectomy (Bilateral); Breast surgery; Appendectomy; and Hysterectomy.    Treatment Diagnosis: L2 compression fracture      Assessment   Performance deficits / Impairments: Decreased functional mobility ;Decreased strength;Decreased endurance;Decreased high-level IADLs  Assessment: Pt son present for FTD, educated on DME needs for safe transfers in the bathroom. Pt utilized rollator this tx session with good safety, recommended for home use for ease of carrying items during ADLs and IADLs. Pt and son demonstrated good understanding of education.  Treatment Diagnosis: L2 compression fracture  Prognosis: Good  Activity Tolerance  Activity Tolerance: Patient Tolerated treatment well       Plan   Occupational Therapy Plan  Current Treatment Recommendations: Strengthening, Functional mobility training, Endurance training, Home management training     Restrictions  Restrictions/Precautions  Restrictions/Precautions: Surgical Protocols  Activity Level: Up Ad Sanam  Required Braces or Orthoses?: Yes  Lower Extremity Weight Bearing Restrictions  Right Lower Extremity Weight Bearing: Weight Bearing As Tolerated  Left Lower Extremity Weight Bearing: Weight Bearing As Tolerated  Required Braces or Orthoses  Spinal: Lumbar Corset  Position 
[facility]  SPEECH THERAPY  [facility] 8 REHAB UNIT  Speech Swallow Evaluation    TIME   11:00  11:30  30 minutes    Name: Corinne Nelson  Date of Birth: [birth year]  Gender: Female  Referring Physician: Izabella ALCARAZ  Admitting diagnosis: L2 compression fracture  Secondary diagnoses: Paroxysmal atrial fibrillation  Age-related osteoporosis  Chronic diastolic heart failure  Severe tricuspid regurgitation  Hypertension  Hyperlipidemia  GERD  CHIEF COMPLAINT:  Back pain     Subjective:    Patient alert, in bedside chair, upon entry. No family/support group member present. No pain reported during assessment.    Objective:  Assessed patient's speech production. Patient exhibits weak voicing with slowed lingual movements during verbalizations. SLP still ranked functional intelligibility  of speech for unfamiliar listeners at 100% in utterances with background noise present.    Oral Motor:   Labial ROM: Decreased, on the right, during labial retraction trials and labial protrusion trials.  Labial Strength: Adequate during labial compression trials.  Labial Coordination: Slowed movements   Lingual ROM: Adequate during lingual extension trials with full point achieved. Adequate during lingual elevation trials without use of accessory jaw movement. Adequate movements bilaterally.  Lingual Strength: Decreased   Lingual Coordination: Slowed movements   Patient with lateral lisp    Swallowing:   Assessed patient's swallowing function. With ice chip trials presented by SLP, patient exhibited decreased rotary jaw movement during oral prep. Oral transit of ice chip trials primarily measured 1-2 seconds in length. Oral transit of puree consistency trials, presented by SLP, primarily measured 1-2 seconds in length and min oral cavity residue was noted post swallows; residue cleared from the mouth with additional dry swallows. With regular solid consistency trials presented by SLP, patient exhibited decreased rotary jaw movement during oral 
[facility]  INPATIENT SPEECH THERAPY  [facility] 8 REHAB UNIT      TIME  1030  1100  30 MINUTES    [x]Daily Note  []Progress Note    Date: 2025  Patient Name: Corinne Nelson        MRN: [id number]    Account #: [id number]  : 1932  (89 y.o.)  Gender: female   Primary Provider: No admitting provider for patient encounter.  Swallowing Status/Diet:          Subjective: Patient alert and in bed during treatment session. No family present.     Objective:     Pharyngeal strengthening exercises completed. Patient completed 3 sets of 5 reps of the Effortful Swallow to improve hyolaryngeal elevation, tongue base retraction, and vocal fold closure. Verbal cues and modeling provided throughout pharyngeal strengthening exercises.     Patient completed 2 sets of 5 reps of the Anisa Manuevor to improve hyolaryngeal elevation and clear vallecular residue. Verbal cues and modeling provided throughout pharyngeal strengthening exercises.    SLP will continue to follow and treat.    Diet Solids Recommendation:     Diet Liquid Recommendation:     Compensatory Swallowing Strategies:       SHORT TERM GOAL #1:  Goal 1: Patient will tolerate soft and bite sized consistency and mildly thick/nectar thick liquids with min S/S penetration/aspiration during PO intake.    SHORT TERM GOAL #2:  Goal 2: Re-assessment of swallow function for potential diet upgrade.    SHORT TERM GOAL #3:  Goal 3: Patient will complete breath support, speech production, oral motor, lingual, and pharyngeal exercies on 80% of opportunities and with provision of min cues/prompts.    SHORT TERM GOAL #4:       SHORT TERM GOAL #5:       Swallowing Short Term Goals            ASSESSMENT:  Assessment: []Progressing towards goals          []Not Progressing towards goals    Patient Tolerance of Treatment:   [x]Tolerated well []Tolerated fair []Required rest breaks []Fatigued    Education:  Learner:  []Patient          []Significant Other          []Other  Education 
[facility]  [facility]  [facility] 8 REHAB UNIT      TIME  1000  1030  30 MINUTES    [x]Daily Note  []Progress Note    Date: 2025  Patient Name: Corinne Nelson        MRN: [id number]    Account #: [id number]  : 1932  (89 y.o.)  Gender: female   Primary Provider: No admitting provider for patient encounter.  Swallowing Status/Diet:          Subjective: Patient alert and upright in chair. No family present during session.     Objective:     Pharyngeal strengthening exercises completed. Patient completed 3 sets of 5 reps of the Effortful Swallow to improve hyolaryngeal elevation, tongue base retraction, and vocal fold closure. Verbal cues and modeling provided throughout pharyngeal strengthening exercises.     Patient completed 3 sets of 5 reps of the Anisa Manuevor to improve hyolaryngeal elevation and clear vallecular residue. Verbal cues and modeling provided throughout pharyngeal strengthening exercises.     --EDITED DUE TO CODE UPDATE-- Patient does not like nectar thick liquids and requests change. Safest recommendation would continue to be mildly thick/nectar thick liquids. However, as patient is now a DNR, okay for thin liquids. Patient educated on s/s of penetration/aspiration.     SLP will continue to follow and treat.    Diet Solids Recommendation:     Diet Liquid Recommendation:     Compensatory Swallowing Strategies:       SHORT TERM GOAL #1:  Goal 1: Patient will tolerate soft and bite sized consistency and mildly thick/nectar thick liquids with min S/S penetration/aspiration during PO intake.    SHORT TERM GOAL #2:  Goal 2: Re-assessment of swallow function for potential diet upgrade.    SHORT TERM GOAL #3:  Goal 3: Patient will complete breath support, speech production, oral motor, lingual, and pharyngeal exercies on 80% of opportunities and with provision of min cues/prompts.    SHORT TERM GOAL #4:       SHORT TERM GOAL #5:       Swallowing Short Term Goals        
[facility]  [facility]  [facility] 8 REHAB UNIT      TIME  0730  0800  30 MINUTES    [x]Daily Note  []Progress Note    Date: 2025  Patient Name: Corinne Nelson        MRN: [id number]    Account #: [id number]  : 1932  (89 y.o.)  Gender: female   Primary Provider: No admitting provider for patient encounter.  Swallowing Status/Diet:          Subjective: Patient alert and upright in chair. No family present.     Objective:     Pharyngeal strengthening exercises completed. Patient completed 3 sets of 5 reps of the Effortful Swallow to improve hyolaryngeal elevation, tongue base retraction, and vocal fold closure. Verbal cues and modeling provided throughout pharyngeal strengthening exercises.     Patient completed 3 sets of 5 reps of the Anisa Manuevor to improve hyolaryngeal elevation and clear vallecular residue. Verbal cues and modeling provided throughout pharyngeal strengthening exercises.     Patient does not like nectar thick liquids and requests change. Safest recommendation would continue to be mildly thick/nectar thick liquids. However, as patient is now a DNR, okay for thin liquids. Patient educated on s/s of penetration/aspiration.       SLP will continue to follow and treat.    Diet Solids Recommendation:     Diet Liquid Recommendation:     Compensatory Swallowing Strategies:       SHORT TERM GOAL #1:  Goal 1: Patient will tolerate soft and bite sized consistency and mildly thick/nectar thick liquids with min S/S penetration/aspiration during PO intake.    SHORT TERM GOAL #2:  Goal 2: Re-assessment of swallow function for potential diet upgrade.    SHORT TERM GOAL #3:  Goal 3: Patient will complete breath support, speech production, oral motor, lingual, and pharyngeal exercies on 80% of opportunities and with provision of min cues/prompts.      ASSESSMENT:  Assessment: [x]Progressing towards goals          []Not Progressing towards goals    Patient Tolerance of 
Nutrition Assessment     Type and Reason for Visit: Reassess    Nutrition Recommendations/Plan:   Modify current ONS to Magic cup at L & D   Follow for new weight, labs, po intake     Malnutrition Assessment:  Malnutrition Status: Severe malnutrition    Nutrition Assessment:  Patient had finished lunch at time of visit.  PO intake has improved and now is usually %.  Aware SLP has reevaluated and diet has been modified to Soft and bite sized Cardiac with mildly thick liquids from Easy to chew Cardiac.  Stopping Ensure at this time d/t liquid consistncy change and pt keeps Ensure on bedside table--too thin at this temp.  BM 7/27--ha started receiving Movanliet to help with constipation.  New weight is not available.    Estimated Daily Nutrient Needs:  Energy (kcal):  9782-6991 (25-30/kg) Weight Used for Energy Requirements: Current     Protein (g):   (1-2/kg) Weight Used for Protein Requirements: Current        Fluid (ml/day):  3773-1178 (25-30/kg) Method Used for Fluid Requirements: 1 ml/kcal    Nutrition Related Findings:   Na+ 134,  Glucose 103 Wound Type: None    Current Nutrition Therapies:    ADULT ORAL NUTRITION SUPPLEMENT; Lunch, Dinner; Standard High Calorie/High Protein Oral Supplement  ADULT DIET; Dysphagia - Soft and Bite Sized; Low Fat/Low Chol/High Fiber/2 gm Na; Mildly Thick (Nectar)    Anthropometric Measures:  Height: 167.6 cm (5' 5.98\")  Current Body Wt: 62.4 kg (137 lb 9.1 oz)   BMI: 22.2        Nutrition Diagnosis:   Severe malnutrition related to inadequate protein-energy intake as evidenced by criteria as identified in malnutrition assessment    Nutrition Interventions:   Food and/or Nutrient Delivery: Continue Current Diet, Modify Oral Nutrition Supplement  Nutrition Education/Counseling: No recommendation at this time  Coordination of Nutrition Care: Continue to monitor while inpatient, Speech Therapy  Plan of Care discussed with: pt    Goals:  Goals: Meet at least 75% of estimated 
Occupational Therapy  Facility/Department: [facility] 8 REHAB UNIT  Daily Treatment Note  NAME: Corinne Nelson  : 1932  MRN: [id number]    Date of Service: 25 0900   Restrictions/Precautions   Restrictions/Precautions Surgical Protocols;Fall Risk   Required Braces or Orthoses? Yes   Lower Extremity Weight Bearing Restrictions   Right Lower Extremity Weight Bearing Weight Bearing As Tolerated   Left Lower Extremity Weight Bearing Weight Bearing As Tolerated   Required Braces or Orthoses   Spinal Thoracic Lumbar Sacral Orthotics   Position Activity Restriction   Spinal Precautions No Bending/Lifting/Twisting (BLT)   General   Additional Pertinent Hx Paroxysmal atrial fibrillation, Chronic diastolic heart failure, Hypertension, Hyperlipidemia, GERD   Diagnosis L2 compression fracture   Vitals   O2 Device Nasal cannula   Pain   Pre-Pain 0   Post-Pain 0   Orientation   Overall Orientation Status WFL   Cognition   Overall Cognitive Status WFL   Balance   Standing Balance Stand by assistance   Functional Mobility   Functional - Mobility Device Rolling Walker   Activity To/from bathroom   Assist Level Contact guard assistance   Transfers   Sit to stand Stand by assistance;Contact guard assistance   Stand to sit Stand by assistance;Contact guard assistance   Transfer Comments with RW   Toilet Transfers   Toilet - Technique Ambulating   Equipment Used Grab bars   Toilet Transfer Contact guard assistance;Stand by assistance   Toilet Transfers Comments with RW   Shower Transfers   Shower - Transfer From Walker   Shower - Transfer Type To and From   Shower - Transfer To Transfer tub bench   Shower - Technique Ambulating   Shower Transfers Contact Guard   Activity Tolerance   Activity Tolerance Patient Tolerated treatment well   Assessment   Performance deficits / Impairments Decreased functional mobility ;Decreased ADL status;Decreased strength;Decreased endurance;Decreased balance;Decreased high-level IADLs 
Occupational Therapy  Facility/Department: [facility] 8 REHAB UNIT  Rehabilitation Occupational Therapy Daily Treatment Note    Date: 25  Patient Name: Corinne Nelson       Room: 0824/824-02  MRN: [id number]  Account: [id number]   : 1932  (89 y.o.) Gender: female        Diagnosis: L2 compression fracture  Additional Pertinent Hx: Paroxysmal atrial fibrillation, Chronic diastolic heart failure, Hypertension, Hyperlipidemia, GERD    Treatment Diagnosis: L2 compression fracture   Past Medical History:  has a past medical history of Cancer (HCC), CHF (congestive heart failure) (HCC), GERD (gastroesophageal reflux disease), and Hypertension.  Past Surgical History:   has a past surgical history that includes Pacemaker insertion (Left); Mastectomy (Bilateral); Breast surgery; Appendectomy; and Hysterectomy.    Restrictions  Restrictions/Precautions: Weight Bearing, Fall Risk, Surgical Protocols  Right Lower Extremity Weight Bearing: Weight Bearing As Tolerated  Left Lower Extremity Weight Bearing: Weight Bearing As Tolerated  Required Braces or Orthoses  Spinal: Thoracic Lumbar Sacral Orthotics  Required Braces or Orthoses?: Yes     25 4370   Administrative Information   Lives With Family   Health Literacy   How often do you need to have someone help you when you read instructions, pamphlets, or other written material from your doctor or pharmacy? Never   Confusion Assessment Method (CAM)   Is there evidence of an acute change in mental status from the patient's baseline? No   Inattention Behavior not present   Disorganized thinking Behavior not present   Altered level of consciousness Behavior not present   Cognitive Patterns   Cognitive Pattern Assessment Used BIMS   Repetition of Three Words (First Attempt) 2   Temporal Orientation: Year Correct   Temporal Orientation: Month Accurate within 5 days   Temporal Orientation: Day Correct   Able to recall \"sock” Yes, no cue required   Able to recall \"blue\" Yes, no 
Occupational Therapy  Facility/Department: [facility] 8 REHAB UNIT  Rehabilitation Occupational Therapy Daily Treatment Note    Date: 25  Patient Name: Corinne Nelson       Room: 0824/824-02  MRN: [id number]  Account: [id number]   : 1932  (89 y.o.) Gender: female        Diagnosis: (P) L2 compression fracture  Additional Pertinent Hx: (P) Paroxysmal atrial fibrillation, Chronic diastolic heart failure, Hypertension, Hyperlipidemia, GERD    Treatment Diagnosis: (P) L2 compression fracture   Past Medical History:  has a past medical history of Cancer (HCC), CHF (congestive heart failure) (HCC), GERD (gastroesophageal reflux disease), and Hypertension.  Past Surgical History:   has a past surgical history that includes Pacemaker insertion (Left); Mastectomy (Bilateral); Breast surgery; Appendectomy; and Hysterectomy.     25 1345   Restrictions/Precautions   Activity Level Up Ad Sanam   Required Braces or Orthoses   Spinal Lumbar Corset   Position Activity Restriction   Spinal Precautions No Bending/Lifting/Twisting (BLT)   General   Additional Pertinent Hx Paroxysmal atrial fibrillation, Chronic diastolic heart failure, Hypertension, Hyperlipidemia, GERD   Diagnosis L2 compression fracture   Balance   Standing Balance Modified independent    Functional Mobility   Functional - Mobility Device Rolling Walker   Assist Level Modified independent    Functional Mobility Comments in retail area on 1st floor of hospital   Bed mobility   Supine to Sit Modified independent   Sit to Supine Modified independent   Transfers   Sit to stand Modified independent   Stand to sit Modified independent   OT Exercises   Exercise Treatment Mili 10#- 10 reps   Long Term Goals   Long Term Goal 1 MET   Long Term Goal 2 MET   Activity Tolerance   Activity Tolerance Patient Tolerated treatment well   Assessment   Performance deficits / Impairments Decreased functional mobility ;Decreased ADL status;Decreased strength;Decreased 
Occupational Therapy  Facility/Department: [facility] 8 REHAB UNIT  Rehabilitation Occupational Therapy Daily Treatment Note    Date: 25  Patient Name: Corinne Nelson       Room: 0824/824-02  MRN: [id number]  Account: [id number]   : 1932  (89 y.o.) Gender: female        Diagnosis: L2 compression fracture  Additional Pertinent Hx: Paroxysmal atrial fibrillation, Chronic diastolic heart failure, Hypertension, Hyperlipidemia, GERD    Treatment Diagnosis: L2 compression fracture   Past Medical History:  has a past medical history of Cancer (HCC), CHF (congestive heart failure) (HCC), GERD (gastroesophageal reflux disease), and Hypertension.  Past Surgical History:   has a past surgical history that includes Pacemaker insertion (Left); Mastectomy (Bilateral); Breast surgery; Appendectomy; and Hysterectomy.     25 0900   Restrictions/Precautions   Restrictions/Precautions Surgical Protocols;Fall Risk   Required Braces or Orthoses   Spinal Lumbar Corset   Position Activity Restriction   Spinal Precautions No Bending/Lifting/Twisting (BLT)   General   Additional Pertinent Hx Paroxysmal atrial fibrillation, Chronic diastolic heart failure, Hypertension, Hyperlipidemia, GERD   Diagnosis L2 compression fracture   Balance   Standing Balance Stand by assistance   Functional Mobility   Functional - Mobility Device Rolling Walker   Assist Level Stand by assistance   Functional Mobility Comments OT gym, apt, and bathroom   Bed mobility   Supine to Sit Supervision   Sit to Supine Supervision   Transfers   Sit to stand Stand by assistance   Stand to sit Stand by assistance   Toilet Transfers   Toilet - Technique Ambulating   Toilet Transfer Stand by assistance   Toilet Transfers Comments has a BSC in her garage- rec placing over toilet   Tub Transfers   Tub - Transfer From Rolling walker   Tub - Transfer Type To and From   Tub - Transfer To Transfer tub bench   Tub - Technique Ambulating   Tub Transfers Supervision   Tub 
Occupational Therapy  Facility/Department: [facility] 8 REHAB UNIT  Rehabilitation Occupational Therapy Daily Treatment Note    Date: 25  Patient Name: Corinne Nelson       Room: 0824/824-02  MRN: [id number]  Account: [id number]   : 1932  (89 y.o.) Gender: female        Diagnosis: (P) L2 compression fracture  Additional Pertinent Hx: (P) Paroxysmal atrial fibrillation, Chronic diastolic heart failure, Hypertension, Hyperlipidemia, GERD    Treatment Diagnosis: (P) L2 compression fracture   Past Medical History:  has a past medical history of Cancer (HCC), CHF (congestive heart failure) (HCC), GERD (gastroesophageal reflux disease), and Hypertension.  Past Surgical History:   has a past surgical history that includes Pacemaker insertion (Left); Mastectomy (Bilateral); Breast surgery; Appendectomy; and Hysterectomy.     25 1100   Restrictions/Precautions   Restrictions/Precautions Surgical Protocols;Fall Risk   Required Braces or Orthoses   Spinal Thoracic Lumbar Sacral Orthotics   Position Activity Restriction   Spinal Precautions No Bending/Lifting/Twisting (BLT)   General   Additional Pertinent Hx Paroxysmal atrial fibrillation, Chronic diastolic heart failure, Hypertension, Hyperlipidemia, GERD   Diagnosis L2 compression fracture   Vitals   SpO2 (!) 87 %  (stayed in the 90s at rest but dropped with ambulation)   O2 Device None (Room air)  (95 on 1L O2)   ADL   UE Bathing Setup   LE Bathing Stand by assistance;Contact guard assistance   UE Dressing Setup   LE Dressing Moderate assistance   Toileting Minimal assistance   Toileting Skilled Clinical Factors incontinent of bladder, required clothing change d/t brief leaking   Balance   Standing Balance Contact guard assistance   Standing Balance   Activity ADLs   Functional Mobility   Functional - Mobility Device Rolling Walker   Activity To/from bathroom;Other   Assist Level Contact guard assistance   Functional Mobility Comments in pt room, OT tx area, OT 
Occupational Therapy  Facility/Department: [facility] 8 REHAB UNIT  Rehabilitation Occupational Therapy Daily Treatment Note    Date: 25  Patient Name: Corinne Nelson       Room: 0824/824-02  MRN: [id number]  Account: [id number]   : 1932  (89 y.o.) Gender: female        Diagnosis: (P) L2 compression fracture  Additional Pertinent Hx: (P) Paroxysmal atrial fibrillation, Chronic diastolic heart failure, Hypertension, Hyperlipidemia, GERD    Treatment Diagnosis: (P) L2 compression fracture   Past Medical History:  has a past medical history of Cancer (HCC), CHF (congestive heart failure) (HCC), GERD (gastroesophageal reflux disease), and Hypertension.  Past Surgical History:   has a past surgical history that includes Pacemaker insertion (Left); Mastectomy (Bilateral); Breast surgery; Appendectomy; and Hysterectomy.     25 1300   Restrictions/Precautions   Restrictions/Precautions Surgical Protocols;Fall Risk   Required Braces or Orthoses   Spinal Thoracic Lumbar Sacral Orthotics   Position Activity Restriction   Spinal Precautions No Bending/Lifting/Twisting (BLT)   General   Additional Pertinent Hx Paroxysmal atrial fibrillation, Chronic diastolic heart failure, Hypertension, Hyperlipidemia, GERD   Diagnosis L2 compression fracture   Balance   Standing Balance Stand by assistance   Functional Mobility   Functional - Mobility Device Rolling Walker   Activity Retrieve items   Assist Level Stand by assistance   Functional Mobility Comments in OT kitchen   Transfers   Sit to stand Stand by assistance;Contact guard assistance   Stand to sit Stand by assistance;Contact guard assistance   OT Exercises   Exercise Treatment 2# Tbar   Activity Tolerance   Activity Tolerance Patient Tolerated treatment well   Assessment   Performance deficits / Impairments Decreased functional mobility ;Decreased ADL status;Decreased strength;Decreased endurance;Decreased balance;Decreased high-level IADLs   Treatment Diagnosis L2 
Occupational Therapy  Facility/Department: [facility] 8 REHAB UNIT  Rehabilitation Occupational Therapy Daily Treatment Note    Date: 25  Patient Name: Corinne Nelson       Room: 0824/824-02  MRN: [id number]  Account: [id number]   : 1932  (89 y.o.) Gender: female         Past Medical History:  has a past medical history of Cancer (HCC), CHF (congestive heart failure) (HCC), GERD (gastroesophageal reflux disease), and Hypertension.  Past Surgical History:   has a past surgical history that includes Pacemaker insertion (Left); Mastectomy (Bilateral); Breast surgery; Appendectomy; and Hysterectomy.    Restrictions  Restrictions/Precautions: Weight Bearing, Fall Risk, Surgical Protocols  Right Lower Extremity Weight Bearing: Weight Bearing As Tolerated  Left Lower Extremity Weight Bearing: Weight Bearing As Tolerated  Required Braces or Orthoses  Spinal: Thoracic Lumbar Sacral Orthotics  Required Braces or Orthoses?: Yes     25 0815   Vitals   O2 Device Nasal cannula  (2 liters)   Pain   Pre-Pain 0   Post-Pain 0   Functional Mobility   Functional - Mobility Device Rolling Walker   Activity To/from bathroom   Assist Level Contact guard assistance   Transfers   Stand Step Transfers Contact guard assistance   Sit to stand Minimal assistance   Stand to sit Minimal assistance   Toilet Transfers   Toilet - Technique Ambulating   Equipment Used Grab bars   Toilet Transfer Contact guard assistance   Toilet Transfers Comments with RW   Assessment   Performance deficits / Impairments Decreased functional mobility ;Decreased ADL status;Decreased strength;Decreased endurance;Decreased balance;Decreased high-level IADLs   Occupational Therapy Plan   Current Treatment Recommendations Strengthening;Balance training;Functional mobility training;Endurance training;Pain management;Safety education & training;Patient/Caregiver education & training;Equipment evaluation, education, & procurement;Positioning;Self-Care / ADL;Home 
Occupational Therapy  Facility/Department: [facility] 8 REHAB UNIT  Rehabilitation Occupational Therapy Initial Evaluation    Date: 25  Patient Name: Corinne Nelson       Room: 0824/824-02  MRN: [id number]  Account: [id number]   : 1932  (89 y.o.) Gender: female        25 1100   Eating   Assistance Needed Setup or clean-up assistance   CARE Score 5   Discharge Goal 6   Oral Hygiene   Assistance Needed Setup or clean-up assistance   CARE Score 5   Discharge Goal 6   Shower/Bathe Self   Assistance Needed Partial/moderate assistance   Comment min A   CARE Score 3   Discharge Goal 5   Upper Body Dressing   Assistance Needed Supervision or touching assistance   Comment supervision cues to follow precautions   CARE Score 4   Discharge Goal 6   Lower Body Dressing   Assistance Needed Partial/moderate assistance   Comment min A cues to follow precautions   CARE Score 3   Discharge Goal 6   Putting On/Taking Off Footwear   Assistance Needed Dependent   Comment without AE   CARE Score 1   Discharge Goal 6   Toileting Hygiene   Assistance needed Partial/moderate assistance   Comment min A cues to follow precautions   CARE Score 3   Discharge Goal 6   Toilet Transfer   Assistance needed Supervision or touching assistance   Comment CGA   CARE Score 4   Discharge Goal 6   Picking Up Object   Reason if not Attempted Not attempted due to medical condition or safety concerns   CARE Score 88   Discharge Goal 6      25 1100   Restrictions/Precautions   Restrictions/Precautions Fall Risk;Surgical Protocols   Required Braces or Orthoses? Yes   Required Braces or Orthoses   Spinal Thoracic Lumbar Sacral Orthotics   Position Activity Restriction   Spinal Precautions No Bending/Lifting/Twisting (BLT)   General   Chart Reviewed Yes   Patient assessed for rehabilitation services? Yes   Additional Pertinent Hx Paroxysmal atrial fibrillation, Chronic diastolic heart failure, Hypertension, Hyperlipidemia, GERD   Diagnosis L2 
Occupational Therapy  Facility/Department: [facility] 8 REHAB UNIT  Rehabilitation Occupational Therapy Daily Treatment Note    Date: 25  Patient Name: Corinne Nelson       Room: 0824/824-02  MRN: [id number]  Account: [id number]   : 1932  (89 y.o.) Gender: female         Past Medical History:  has a past medical history of Cancer (HCC), CHF (congestive heart failure) (HCC), GERD (gastroesophageal reflux disease), and Hypertension.  Past Surgical History:   has a past surgical history that includes Pacemaker insertion (Left); Mastectomy (Bilateral); Breast surgery; Appendectomy; and Hysterectomy.    Restrictions  Restrictions/Precautions: Weight Bearing, Fall Risk, Surgical Protocols  Right Lower Extremity Weight Bearing: Weight Bearing As Tolerated  Left Lower Extremity Weight Bearing: Weight Bearing As Tolerated  Required Braces or Orthoses  Spinal: Thoracic Lumbar Sacral Orthotics  Required Braces or Orthoses?: Yes     25 1300   Pre-Pain 5   Post-Pain 5   Pain Location Back   Pain Descriptor Aching   Pain Interventions Rest   Balance   Standing Balance Contact guard assistance   Standing Balance   Time 4 minues x2   Activity 1 handed tabletop activity   Comment CGA   Transfers   Stand Step Transfers Contact guard assistance   Sit to stand Minimal assistance   Stand to sit Minimal assistance   Transfer Comments with RW   OT Exercises   Exercise Treatment 2 sets 1 plane 15 reps 2# cane wand   Activity Tolerance   Activity Tolerance Patient Tolerated treatment well   Assessment   Performance deficits / Impairments Decreased functional mobility ;Decreased ADL status;Decreased strength;Decreased endurance;Decreased balance;Decreased high-level IADLs   Occupational Therapy Plan   Current Treatment Recommendations Strengthening;Balance training;Functional mobility training;Endurance training;Pain management;Safety education & training;Patient/Caregiver education & training;Equipment evaluation, education, & 
Occupational Therapy  Facility/Department: [facility] 8 REHAB UNIT  Rehabilitation Occupational Therapy Daily Treatment Note    Date: 25  Patient Name: Corinne Nelson       Room: 0824/824-02  MRN: [id number]  Account: [id number]   : 1932  (89 y.o.) Gender: female        Diagnosis: (P) L2 compression fracture  Additional Pertinent Hx: (P) Paroxysmal atrial fibrillation, Chronic diastolic heart failure, Hypertension, Hyperlipidemia, GERD    Treatment Diagnosis: (P) L2 compression fracture   Past Medical History:  has a past medical history of Cancer (HCC), CHF (congestive heart failure) (HCC), GERD (gastroesophageal reflux disease), and Hypertension.  Past Surgical History:   has a past surgical history that includes Pacemaker insertion (Left); Mastectomy (Bilateral); Breast surgery; Appendectomy; and Hysterectomy.     25 1100   Restrictions/Precautions   Activity Level Up Ad Sanam   Required Braces or Orthoses   Spinal Lumbar Corset   Position Activity Restriction   Spinal Precautions No Bending/Lifting/Twisting (BLT)   General   Additional Pertinent Hx Paroxysmal atrial fibrillation, Chronic diastolic heart failure, Hypertension, Hyperlipidemia, GERD   Diagnosis L2 compression fracture   Subjective   Subjective Checked up ad sanam   Balance   Sitting Balance Independent   Standing Balance Modified independent    Standing Balance   Time 5 mins   Activity 2 hand laundry act   Functional Mobility   Functional - Mobility Device Rolling Walker   Activity To/from bathroom;Other   Assist Level Modified independent    Transfers   Sit to stand Modified independent   Stand to sit Modified independent   Toilet Transfers   Toilet - Technique Ambulating   Toilet Transfer Modified independent   OT Exercises   Exercise Treatment 2# Tbar   Short Term Goals   Short Term Goal 1 MET   Short Term Goal 2 MET   Short Term Goal 3 MET   Short Term Goal 5 MET   Short Term Goal 6 MET   Short Term Goal 7 MET   Activity Tolerance 
Patient is discharged home today. IMM letter signed.   MSW sent HH order and discharge summary to Jacy Wiggins with [facility]. [facility] contact information added to AVS. [facility] will call patient/son to set up time to see patient at home.   No other dc needs identified.     Electronically signed by KYLE Price on 8/1/2025 at 8:23 AM   
Physical Therapy  Name: Corinne Nelson  MRN:  [id number]  Date of service:  7/28/2025 07/28/25 1000   Restrictions/Precautions   Restrictions/Precautions Surgical Protocols;Fall Risk   Lower Extremity Weight Bearing Restrictions   Right Lower Extremity Weight Bearing Weight Bearing As Tolerated   Left Lower Extremity Weight Bearing Weight Bearing As Tolerated   Required Braces or Orthoses   Spinal Thoracic Lumbar Sacral Orthotics   Position Activity Restriction   Spinal Precautions No Bending/Lifting/Twisting (BLT)   General   Additional Pertinent Hx HTN, HLD, CHF, OSTEOPEROSIS, PACEMKER, AFIB, BRADYCARDIA   Diagnosis L2 COMPRESSION FX   General   General Comments coming out of BR with OT   Subjective   Subjective Pt agreeable. Hopes to go home soon. States she had a bad night.   Pain   Pre-Pain 4  (faces, pt reports as \"mild\")   Pain Location Other (Comment)  (back)   Pain Descriptor Aching   Pain Interventions Rest;Repositioning   Transfers   Sit to Stand Stand by assistance;Contact guard assistance   Stand to Sit Stand by assistance   Comment pt uses one hand on WC and one on walker for STS transitions; sometimes needs 2nd or 3rd att to achieve standing (home use of lift chair reported)   Ambulation   WB Status WBAT   Ambulation   Surface Level tile   Device Rolling Walker   Other Apparatus O2   Assistance Contact guard assistance   Quality of Gait Reciprocal pattern.  Forward flexed posture.   Gait Deviations Decreased step height;Decreased step length   Distance 200'   Comments 96% spO2 post amb and 64HR   Stairs/Curb   Stairs? Yes   Stairs   # Steps  3   Stairs Height 6\"   Rails Right ascending   Assistance Contact guard assistance   Comment step to pattern leading with L LE asc and R LE desc; some reluctance when descending but good control and no LOB   PT Exercises   A/AROM Exercises seated B LE x 15: isometric hip add, knee ext, hip flex   Resistive Exercises seated B x 15 with man resist: knee flex, hip 
REHAB PROGRESS NOTE        Patient:   Corinne Nelson  MR#:    [id number]  Account Number:                   [id number]      Room:    [address] [place], [place] [address]-   YOB: 1932  Date of Progress Note: 7/28/2025  Time of Note                           12:15 PM          Subjective:    Still has some intermittent back pain but seems to be responding well to current analgesic regimen.  She reported having a small solid bowel movement yesterday.  She denies any worsening dyspnea from baseline and no increase in peripheral swelling.  She is participating with therapy, wearing LSO brace.        SUMMARY:    89 y.o. female who presents with fall at home on 7/21/2025.  She was just admitted with a diastolic CHF exacerbation the previous week with a mechanical fall at home on day of admission, 7/16. 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  She said she  was turning while holding a pot of boiling potatoes and lost her balance. She slid backwards and  hit her head on a table behind her, but mostly fell on her buttocks. She had immediat acute back pain. She  called her son who came and helped her get up and get to the ER.  No new neurologic deficits.  CT of lumbar spine showed acute appearing 2 column L2 compression fracture with 50% vertebral body height loss. Old appearing L1 superior endplate compression fracture. Neuro surgery was consulted and decision was made to treat conservatively, but ordered bracing when OOB. She had a TLSO brace from home from a previous L1 fx. Her son brought it for her to use now.   She normally does not use O2 at home, but since being in the hospital, her sats drop in the 80's without having 2-3L of O2 on.   She is now  medically stable and is participating with therapy. She is ready to begin rehab with goal of returning home after rehab dc with support from her son.         REVIEW OF SYSTEMS:  Negative    Past Medical History:    
REHAB PROGRESS NOTE        Patient:   Corinne Nelson  MR#:    [id number]  Account Number:                   [id number]      Room:    [facility]24-   YOB: 1932  Date of Progress Note: 7/26/2025  Time of Note                           11:49 AM          Subjective:    She endorses some fatigue, still having some back pain, but does report improvement in pain with current medication regimen prior to working with therapy.  She tells me she has not had a bowel movement for the past couple of days.  She is participating in therapy.       SUMMARY:    89 y.o. female who presents with fall at home on 7/21/2025.  She was just admitted with a diastolic CHF exacerbation the previous week with a mechanical fall at home on day of admission, 7/16. 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  She said she  was turning while holding a pot of boiling potatoes and lost her balance. She slid backwards and  hit her head on a table behind her, but mostly fell on her buttocks. She had immediat acute back pain. She  called her son who came and helped her get up and get to the ER.  No new neurologic deficits.  CT of lumbar spine showed acute appearing 2 column L2 compression fracture with 50% vertebral body height loss. Old appearing L1 superior endplate compression fracture. Neuro surgery was consulted and decision was made to treat conservatively, but ordered bracing when OOB. She had a TLSO brace from home from a previous L1 fx. Her son brought it for her to use now.   She normally does not use O2 at home, but since being in the hospital, her sats drop in the 80's without having 2-3L of O2 on.   She is now  medically stable and is participating with therapy. She is ready to begin rehab with goal of returning home after rehab dc with support from her son.         REVIEW OF SYSTEMS:  Negative    Past Medical History:      Diagnosis Date    Cancer (HCC)     breast 
Reviewed patient status, vitals, labs again today.  Mild hyponatremia stable, slight improvement with sodium 131, continue oral Lasix daily which may help with this along with maintaining euvolemia for heart failure.  If sodium significant worsens would consider further workup with urine and serum osmolality, urine sodium levels and would also consider holding Lexapro as possible cause of SIADH.  Noted mild hypercalcemia, will check PTH level, Lasix may also help with mild hypercalcemia, will avoid any calcium supplements or vitamin D analogs, reviewed med list.  Continue to follow.  Continue ongoing inpatient rehab.  
Reviewed vitals, labs, updates.  Continue rehab as outlined along with pain management.  Sodium level trending down to 130, will repeat metabolic panel tomorrow to monitor for any worsening hyponatremia, if so we will workup further with serum and urine osmolality, urine sodium.  Continue to monitor renal function and electrolytes while on Lasix, will increase from intermittent dosing to Lasix once daily and institute judicious p.o. water restriction.  Can encourage fluids other than water, may have poor solute diet contributing to lower sodium.  
SUMMARY:    89 y.o. female who presents with fall at home on 7/21/2025.  She was just admitted with a diastolic CHF exacerbation the previous week with a mechanical fall at home on day of admission, 7/16. 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  She said she  was turning while holding a pot of boiling potatoes and lost her balance. She slid backwards and  hit her head on a table behind her, but mostly fell on her buttocks. She had immediat acute back pain. She  called her son who came and helped her get up and get to the ER.  No new neurologic deficits.  CT of lumbar spine showed acute appearing 2 column L2 compression fracture with 50% vertebral body height loss. Old appearing L1 superior endplate compression fracture. Neuro surgery was consulted and decision was made to treat conservatively, but ordered bracing when OOB. She had a TLSO brace from home from a previous L1 fx. Her son brought it for her to use now.   She normally does not use O2 at home, but since being in the hospital, her sats drop in the 80's without having 2-3L of O2 on.   She is now  medically stable and is participating with therapy. She is ready to begin rehab with goal of returning home after rehab dc with support from her son.     Constipation noted, exacerbated by opioids, continuing MiraLAX, senna/docusate scheduled, Movantik added.  Chronic heart failure compensated, euvolemic, continuing low-dose oral Lasix intermittently/as needed.     A-fib on warfarin chronically, following INR which has been subtherapeutic, INR still subtherapeutic today at 1.71.  Continue warfarin with dose adjustments per pharmacy.    When ready for discharge, can consult with pharmacy on optimal warfarin dosing regimen (if differs significantly from what she was taking prior).    
Spoke to patient and her son in room re discharge date and discharge planning. Will plan for discharge on Friday, 8/1. Patient would like to have [facility]. MSW will arrange [facility] when HH orders are received.     Electronically signed by KYLE Price on 7/29/2025 at 12:22 PM   
Spoke to patient in room to inform of [facility] date (8/4) and to discuss discharge planning. Patient hopes to discharge prior to [facility] date and agrees to home health care at dc. Will monitor progress to determine dc date and will arrange home health when physician orders are in Epic.     Electronically signed by KYLE Price on 7/28/2025 at 12:53 PM   
[facility] Inpatient Rehabilitation Unit  Test for Patient Mccammon in the Areas of Transfers/Ambulation    Ambulation/Transfers   Independent ambulation in room with assistive device:  Yes     -Device Type:  RW  Independent transfers from wheelchair to surface in room:  No     Bathroom Transfers  Independent transfers to toilet: Yes   Independent transfers for shower:  No     Ambulation in hallway  Patient able to ambulate in hallway with device:   No          Inpatient Rehabilitation Nursing and Therapies feel as though the patient qualifies for an acute rehabilitation test for independence in the areas of transfers and ambulation prior to discharging from Inpatient Rehabilitation Unit at [facility].  This test for independence in the areas of transfers and ambulation must be agreed upon by the patient's physician, nurse, and therapists.        Nurse Approval:  Electronically signed by Laura Magana LPN on 7/30/2025 at 11:45 AM     Physical Therapist Approval:  Electronically signed by Marizol Gandhi PTA on 7/30/2025 at 9:07 AM     Occupational Therapist Approval: Electronically signed by KARSTEN Cheek on 7/30/25 at 11:32 AM CDT      
Current  Energy (kcal/day): 5203-1200 (25-30/kg)  Weight Used for Protein Requirements: Current  Protein (g/day):  (1-2/kg)  Method Used for Fluid Requirements: 1 ml/kcal  Fluid (ml/day): 7461-2817 (25-30/kg)    Nutrition Diagnosis:   Severe malnutrition related to inadequate protein-energy intake as evidenced by criteria as identified in malnutrition assessment    Nutrition Interventions:   Food and/or Nutrient Delivery: Modify Current Diet, Start Oral Nutrition Supplement  Nutrition Education/Counseling: No recommendation at this time  Coordination of Nutrition Care: Continue to monitor while inpatient  Plan of Care discussed with: Pt    Goals:  Goals: PO intake 50% or greater  Type of Goal: New goal  Previous Goal Met: New Goal    Nutrition Monitoring and Evaluation:   Behavioral-Environmental Outcomes: None Identified  Food/Nutrient Intake Outcomes: Food and Nutrient Intake, Supplement Intake  Physical Signs/Symptoms Outcomes: Biochemical Data, Chewing or Swallowing, Fluid Status or Edema, Nutrition Focused Physical Findings, Skin, Weight    Discharge Planning:    Too soon to determine     Concha Chou RD  Contact: [phone]    
rhythm  - Chronically anticoagulated on warfarin  - Maintain on warfarin with further dosing adjustment to achieve goal INR  - INR better, 1.98 today, follow INR daily with goal 2-3     Hospitalist consulted for medical management.     Status, plan of care discussed with nursing and therapy staff      Expected duration and frequency therapy: 180 minutes per day, 5 days per week       Arslan Varner DO   Inpatient Rehab Director   Board Certified Neurology   [phone]

## 2025-08-02 VITALS
HEART RATE: 60 BPM | HEIGHT: 66 IN | SYSTOLIC BLOOD PRESSURE: 158 MMHG | WEIGHT: 137.6 LBS | TEMPERATURE: 97.2 F | BODY MASS INDEX: 22.11 KG/M2 | RESPIRATION RATE: 16 BRPM | OXYGEN SATURATION: 93 % | DIASTOLIC BLOOD PRESSURE: 74 MMHG

## 2025-08-02 NOTE — DISCHARGE SUMMARY
Occupational Therapy Discharge Summary         Date: 2025  Patient Name: Corinne Nelson        MRN: [id number]    : 1932  (89 y.o.)  Gender: female      Diagnosis: L2 COMPRESSION FX  Restrictions/Precautions  Restrictions/Precautions: Surgical Protocols  Activity Level: Up Ad Sanam  Required Braces or Orthoses?: Yes      Discharge Date: 2025      UE Functioning:  BUE AROM WFL     Home Management:  Functional Mobility  Functional - Mobility Device: 4-Wheeled Walker  Activity: To/from bathroom, Other  Assist Level: Modified independent   Functional Mobility Comments: In OT gym.    Adaptive Equipment/DME Status:  Bathroom Equipment: Grab bars in shower  Home Equipment: Walker - Standard, Cane - Quad  Rollator, Rolling walker and Bedside commode. Family is buying a tub transfer bench.     Pain Assessment:   1/10 pain in back        Remaining Problems:  Decreased functional mobility ; Decreased strength; Decreased endurance; Decreased high-level IADLs     STGs:  Short Term Goals  Time Frame for Short Term Goals: 1 week  Short Term Goal 1: Pt will perform overall LB dressing with CGA using AE prn  Short Term Goal 2: Pt will perform overall toileting with supervision  Short Term Goal 3: Pt will perform overall bathing with supervision  Short Term Goal 4: Pt will verbilize spinal precautions with independence  Short Term Goal 5: Pt will don/doff TLSO with independnece  Short Term Goal 6: Pt will participate in 1-2 handed standing tasks with supervision for 3 minutes on 3 occassions  Short Term Goal 7: Pt will perform a light ambulatory homemaking activity while following spinal precautions with supervision  MET  Short Term Goals    LTGs:  Long Term Goals  Time Frame for Long Term Goals : 2 weeks  Long Term Goal 1: Pt will perform overall LB dressing with mod I using AE prn  Long Term Goal 2: Pt will perform overall toileting with mod I  Long Term Goal 3: Pt will perform overall bathing with set up

## 2025-08-02 NOTE — DISCHARGE SUMMARY
Physical Therapy DISCHARGE Note  DATE:  2025  NAME:  Corinne Nelson  :  1932  (89 y.o.,female)  MRN:  [id number]    HEIGHT:  Height: 167.6 cm (5' 5.98\")  WEIGHT:  Weight - Scale: 62.4 kg (137 lb 9.6 oz)    PATIENT DIAGNOSIS(ES):    Diagnosis: L2 COMPRESSION FX    Additional Pertinent Hx: HTN, HLD, CHF, OSTEOPEROSIS, PACEMKER, AFIB, BRADYCARDIA  RESTRICTIONS/PRECAUTIONS:    Restrictions/Precautions  Restrictions/Precautions: Surgical Protocols  Activity Level: Up Ad Sanam  Required Braces or Orthoses?: Yes  Position Activity Restriction  Spinal Precautions: No Bending/Lifting/Twisting (BLT)  OVERALL  ORIENTATION STATUS:  Overall Orientation Status: Within Functional Limits  PAIN:  Pain Level: 7  Pain Type: Acute pain    Pain Location: Back     Pain Orientation: Posterior      GROSS ASSESSMENT        POSTURE/BALANCE          ACTIVITY TOLERANCE  Activity Tolerance  Activity Tolerance: Patient tolerated treatment well      BED MOBILITY  Bed mobility  Rolling to Left: Modified independent  Rolling to Right: Modified independent  Supine to Sit: Modified independent  Sit to Supine: Modified independent  Scooting: Modified independent  Bed Mobility Comments: On R side of bed- intermittent use of bedrail        TRANSFERS  Transfers  Sit to Stand: Supervision, Modified independent  Stand to Sit: Supervision, Modified independent  Bed to Chair: Contact guard assistance (Using RW)  Stand Pivot Transfers: Minimal Assistance (WC to R to Toilet; Min assist to initiate standing)  Lateral Transfers: Contact guard assistance (W/C to Recliner using RW)  Car Transfer: Contact guard assistance, Stand by assistance (W/ rollator: Raised height)  Comment: pt uses one hand on WC and one on walker for STS transitions; sometimes needs 2nd or 3rd att to achieve standing (home use of lift chair reported)       AMBULATION 1  Ambulation  Surface: Level tile  Device: Rolling Walker  Other Apparatus: Wheelchair follow  Assistance:

## 2025-08-11 LAB
MC_CV_MDC_IDC_RATE_1: 160
MC_CV_MDC_IDC_ZONE_ID: 1
MDC_IDC_MSMT_BATTERY_REMAINING_LONGEVITY: 78 MO
MDC_IDC_MSMT_BATTERY_REMAINING_PERCENTAGE: 96 %
MDC_IDC_MSMT_BATTERY_STATUS: NORMAL
MDC_IDC_MSMT_LEADCHNL_RV_DTM: NORMAL
MDC_IDC_MSMT_LEADCHNL_RV_IMPEDANCE_VALUE: 724
MDC_IDC_MSMT_LEADCHNL_RV_PACING_THRESHOLD_AMPLITUDE: 0.7
MDC_IDC_MSMT_LEADCHNL_RV_PACING_THRESHOLD_POLARITY: NORMAL
MDC_IDC_MSMT_LEADCHNL_RV_PACING_THRESHOLD_PULSEWIDTH: 0.4
MDC_IDC_PG_IMPLANT_DTM: NORMAL
MDC_IDC_PG_MFG: NORMAL
MDC_IDC_PG_MODEL: NORMAL
MDC_IDC_PG_SERIAL: NORMAL
MDC_IDC_PG_TYPE: NORMAL
MDC_IDC_SESS_DTM: NORMAL
MDC_IDC_SESS_TYPE: NORMAL
MDC_IDC_SET_BRADY_LOWRATE: 60
MDC_IDC_SET_BRADY_MAX_SENSOR_RATE: 120
MDC_IDC_SET_BRADY_MODE: NORMAL
MDC_IDC_SET_LEADCHNL_RV_PACING_AMPLITUDE: 1.2
MDC_IDC_SET_LEADCHNL_RV_PACING_POLARITY: NORMAL
MDC_IDC_SET_LEADCHNL_RV_PACING_PULSEWIDTH: 0.4
MDC_IDC_SET_LEADCHNL_RV_SENSING_POLARITY: NORMAL
MDC_IDC_SET_LEADCHNL_RV_SENSING_SENSITIVITY: 1.5
MDC_IDC_SET_ZONE_STATUS: NORMAL
MDC_IDC_SET_ZONE_TYPE: NORMAL
MDC_IDC_STAT_BRADY_RV_PERCENT_PACED: 97

## 2025-08-21 ENCOUNTER — OFFICE VISIT (OUTPATIENT)
Dept: NEUROSURGERY | Facility: CLINIC | Age: OVER 89
End: 2025-08-21
Payer: MEDICARE

## 2025-08-21 VITALS — HEIGHT: 65 IN | WEIGHT: 140.6 LBS | BODY MASS INDEX: 23.43 KG/M2

## 2025-08-21 DIAGNOSIS — S32.020D CLOSED COMPRESSION FRACTURE OF L2 LUMBAR VERTEBRA WITH ROUTINE HEALING, SUBSEQUENT ENCOUNTER: Primary | ICD-10-CM

## 2025-08-21 DIAGNOSIS — Z78.9 NONSMOKER: ICD-10-CM

## 2025-08-21 PROBLEM — S32.020A CLOSED COMPRESSION FRACTURE OF SECOND LUMBAR VERTEBRA: Status: ACTIVE | Noted: 2020-08-05

## 2025-08-21 PROCEDURE — 1160F RVW MEDS BY RX/DR IN RCRD: CPT | Performed by: PHYSICIAN ASSISTANT

## 2025-08-21 PROCEDURE — 99213 OFFICE O/P EST LOW 20 MIN: CPT | Performed by: PHYSICIAN ASSISTANT

## 2025-08-21 PROCEDURE — 1159F MED LIST DOCD IN RCRD: CPT | Performed by: PHYSICIAN ASSISTANT

## 2025-08-21 RX ORDER — POLYETHYLENE GLYCOL 3350 17 G/17G
17 POWDER, FOR SOLUTION ORAL
COMMUNITY
Start: 2025-08-01 | End: 2025-09-01

## 2025-08-21 RX ORDER — ACETAMINOPHEN 500 MG
TABLET ORAL
COMMUNITY

## (undated) DEVICE — GOLDVAC PUSH BUTTON ELECTROSURGICAL SMOKE EVACUATION HANDPIECE: Brand: GOLDVAC

## (undated) DEVICE — THE CHANNEL CLEANING BRUSH IS A NYLON FLEXI BRUSH ATTACHED TO A FLEXIBLE PLASTIC SHEATH DESIGNED TO SAFELY REMOVE DEBRIS FROM FLEXIBLE ENDOSCOPES.

## (undated) DEVICE — YANKAUER,BULB TIP WITH VENT: Brand: ARGYLE

## (undated) DEVICE — APPL CHLORAPREP HI/LITE 26ML ORNG

## (undated) DEVICE — SOL IRR NACL 0.9PCT BT 1000ML

## (undated) DEVICE — SOL NS 500ML

## (undated) DEVICE — FRCP BX RADJAW4 NDL 2.8 240 STD OG

## (undated) DEVICE — ADHS SKIN PREMIERPRO EXOFIN TOPICAL HI/VISC .5ML

## (undated) DEVICE — INTRO TEAR AWAY/LVD W/SD PRT 6F 13CM

## (undated) DEVICE — PK PM 30

## (undated) DEVICE — SENSR O2 OXIMAX FNGR A/ 18IN NONSTR

## (undated) DEVICE — TBG SMPL FLTR LINE NASL 02/C02 A/ BX/100

## (undated) DEVICE — CUFF,BP,DISP,1 TUBE,ADULT,HP: Brand: MEDLINE

## (undated) DEVICE — DISPOSABLE SURGICAL CABLE

## (undated) DEVICE — CONMED SCOPE SAVER BITE BLOCK, 20X27 MM: Brand: SCOPE SAVER

## (undated) DEVICE — PATIENT RETURN ELECTRODE, SINGLE-USE, CONTACT QUALITY MONITORING, ADULT, WITH 9FT CORD, FOR PATIENTS WEIGING OVER 33LBS. (15KG): Brand: MEGADYNE

## (undated) DEVICE — 3M™ STERI-STRIP™ REINFORCED ADHESIVE SKIN CLOSURES, R1546, 1/4 IN X 4 IN (6 MM X 100 MM), 10 STRIPS/ENVELOPE: Brand: 3M™ STERI-STRIP™

## (undated) DEVICE — 3M™ IOBAN™ 2 ANTIMICROBIAL INCISE DRAPE 6650EZ: Brand: IOBAN™ 2

## (undated) DEVICE — Device: Brand: DEFENDO AIR/WATER/SUCTION AND BIOPSY VALVE

## (undated) DEVICE — PAD, DEFIB, ADULT, RADIOTRANS, PHILIPS: Brand: MEDLINE